# Patient Record
Sex: FEMALE | Race: WHITE | NOT HISPANIC OR LATINO | Employment: FULL TIME | URBAN - METROPOLITAN AREA
[De-identification: names, ages, dates, MRNs, and addresses within clinical notes are randomized per-mention and may not be internally consistent; named-entity substitution may affect disease eponyms.]

---

## 2023-09-11 PROBLEM — R41.3 MEMORY LOSS: Status: ACTIVE | Noted: 2023-09-11

## 2023-09-11 PROBLEM — N32.81 DETRUSOR INSTABILITY OF BLADDER: Status: ACTIVE | Noted: 2023-09-11

## 2023-09-11 PROBLEM — N95.1 FEMALE CLIMACTERIC STATE: Status: ACTIVE | Noted: 2023-09-11

## 2023-09-11 PROBLEM — G47.30 MILD SLEEP APNEA: Status: ACTIVE | Noted: 2023-09-11

## 2023-09-11 PROBLEM — E11.9 DIABETES MELLITUS WITHOUT COMPLICATION (MULTI): Status: ACTIVE | Noted: 2023-09-11

## 2023-09-11 PROBLEM — G56.01 CARPAL TUNNEL SYNDROME OF RIGHT WRIST: Status: ACTIVE | Noted: 2023-09-11

## 2023-09-11 PROBLEM — F33.1 MODERATE EPISODE OF RECURRENT MAJOR DEPRESSIVE DISORDER (MULTI): Status: ACTIVE | Noted: 2023-09-11

## 2023-09-11 PROBLEM — E78.5 HYPERLIPIDEMIA: Status: ACTIVE | Noted: 2023-09-11

## 2023-09-11 PROBLEM — K58.1 IRRITABLE BOWEL SYNDROME WITH CONSTIPATION: Status: ACTIVE | Noted: 2023-09-11

## 2023-09-11 PROBLEM — E03.9 HYPOTHYROIDISM: Status: ACTIVE | Noted: 2023-09-11

## 2023-09-11 PROBLEM — K90.9 INTESTINAL MALABSORPTION, UNSPECIFIED (HHS-HCC): Status: ACTIVE | Noted: 2023-09-11

## 2023-09-11 PROBLEM — M47.22 CERVICAL SPONDYLOSIS WITH RADICULOPATHY: Status: ACTIVE | Noted: 2023-09-11

## 2023-09-11 PROBLEM — F43.10 PTSD (POST-TRAUMATIC STRESS DISORDER): Status: ACTIVE | Noted: 2023-09-11

## 2023-09-11 PROBLEM — F41.9 ANXIETY: Status: ACTIVE | Noted: 2023-09-11

## 2023-09-11 RX ORDER — TRAZODONE HYDROCHLORIDE 100 MG/1
2 TABLET ORAL DAILY
COMMUNITY
Start: 2015-07-27 | End: 2023-12-20

## 2023-09-11 RX ORDER — GLIMEPIRIDE 2 MG/1
2 TABLET ORAL
COMMUNITY
Start: 2019-07-01 | End: 2024-03-04 | Stop reason: SDUPTHER

## 2023-09-11 RX ORDER — DULAGLUTIDE 4.5 MG/.5ML
4.5 INJECTION, SOLUTION SUBCUTANEOUS
COMMUNITY
Start: 2022-08-31 | End: 2023-10-13 | Stop reason: SDUPTHER

## 2023-09-11 RX ORDER — HYDROXYZINE HYDROCHLORIDE 25 MG/1
50 TABLET, FILM COATED ORAL EVERY 8 HOURS PRN
COMMUNITY
Start: 2020-07-08 | End: 2023-12-20 | Stop reason: SDUPTHER

## 2023-09-11 RX ORDER — PAROXETINE HYDROCHLORIDE 40 MG/1
TABLET, FILM COATED ORAL EVERY 24 HOURS
COMMUNITY
Start: 2013-09-06

## 2023-09-11 RX ORDER — PRAVASTATIN SODIUM 10 MG/1
10 TABLET ORAL DAILY
COMMUNITY
Start: 2021-03-25

## 2023-09-11 RX ORDER — METFORMIN HYDROCHLORIDE 500 MG/1
1000 TABLET, EXTENDED RELEASE ORAL 2 TIMES DAILY
COMMUNITY
Start: 2021-03-25 | End: 2023-12-04 | Stop reason: ALTCHOICE

## 2023-09-19 ENCOUNTER — HOSPITAL ENCOUNTER (OUTPATIENT)
Dept: DATA CONVERSION | Facility: HOSPITAL | Age: 63
Discharge: HOME | End: 2023-09-19
Payer: COMMERCIAL

## 2023-09-19 DIAGNOSIS — E11.9 TYPE 2 DIABETES MELLITUS WITHOUT COMPLICATIONS (MULTI): ICD-10-CM

## 2023-09-19 DIAGNOSIS — E78.2 MIXED HYPERLIPIDEMIA: ICD-10-CM

## 2023-09-19 LAB
ALBUMIN SERPL-MCNC: 4.2 GM/DL (ref 3.5–5)
ALBUMIN/GLOB SERPL: 1.6 RATIO (ref 1.5–3)
ALP BLD-CCNC: 100 U/L (ref 35–125)
ALT SERPL-CCNC: 10 U/L (ref 5–40)
ANION GAP SERPL CALCULATED.3IONS-SCNC: 14 MMOL/L (ref 0–19)
APPEARANCE PLAS: CLEAR
AST SERPL-CCNC: 15 U/L (ref 5–40)
BASOPHILS # BLD AUTO: 0.05 K/UL (ref 0–0.22)
BASOPHILS NFR BLD AUTO: 0.8 % (ref 0–1)
BILIRUB SERPL-MCNC: 0.4 MG/DL (ref 0.1–1.2)
BUN SERPL-MCNC: 12 MG/DL (ref 8–25)
BUN/CREAT SERPL: 15 RATIO (ref 8–21)
CALCIUM SERPL-MCNC: 9.1 MG/DL (ref 8.5–10.4)
CHLORIDE SERPL-SCNC: 101 MMOL/L (ref 97–107)
CHOLEST SERPL-MCNC: 281 MG/DL (ref 133–200)
CHOLEST/HDLC SERPL: 4.2 RATIO
CO2 SERPL-SCNC: 27 MMOL/L (ref 24–31)
COLOR SPUN FLD: YELLOW
CREAT SERPL-MCNC: 0.8 MG/DL (ref 0.4–1.6)
CREAT UR-MCNC: 96.8 MG/DL
DEPRECATED RDW RBC AUTO: 37 FL (ref 37–54)
DIFFERENTIAL METHOD BLD: ABNORMAL
EOSINOPHIL # BLD AUTO: 0.09 K/UL (ref 0–0.45)
EOSINOPHIL NFR BLD: 1.4 % (ref 0–3)
ERYTHROCYTE [DISTWIDTH] IN BLOOD BY AUTOMATED COUNT: 12.3 % (ref 11.7–15)
FASTING STATUS PATIENT QL REPORTED: ABNORMAL
GFR SERPL CREATININE-BSD FRML MDRD: 83 ML/MIN/1.73 M2
GLOBULIN SER-MCNC: 2.6 G/DL (ref 1.9–3.7)
GLUCOSE SERPL-MCNC: 182 MG/DL (ref 65–99)
HCT VFR BLD AUTO: 40.5 % (ref 36–44)
HDLC SERPL-MCNC: 67 MG/DL
HGB BLD-MCNC: 13.5 GM/DL (ref 12–15)
IMM GRANULOCYTES # BLD AUTO: 0.01 K/UL (ref 0–0.1)
LDLC SERPL CALC-MCNC: 182 MG/DL (ref 65–130)
LYMPHOCYTES # BLD AUTO: 2.28 K/UL (ref 1.2–3.2)
LYMPHOCYTES NFR BLD MANUAL: 36 % (ref 20–40)
MCH RBC QN AUTO: 27.3 PG (ref 26–34)
MCHC RBC AUTO-ENTMCNC: 33.3 % (ref 31–37)
MCV RBC AUTO: 82 FL (ref 80–100)
MICROALBUMIN UR-MCNC: 12 MG/L (ref 0–23)
MICROALBUMIN/CREAT UR: 12.4 MG/G (ref 0–30)
MONOCYTES # BLD AUTO: 0.48 K/UL (ref 0–0.8)
MONOCYTES NFR BLD MANUAL: 7.6 % (ref 0–8)
NEUTROPHILS # BLD AUTO: 3.43 K/UL
NEUTROPHILS # BLD AUTO: 3.43 K/UL (ref 1.8–7.7)
NEUTROPHILS.IMMATURE NFR BLD: 0.2 % (ref 0–1)
NEUTS SEG NFR BLD: 54 % (ref 50–70)
NRBC BLD-RTO: 0 /100 WBC
PLATELET # BLD AUTO: 367 K/UL (ref 150–450)
PMV BLD AUTO: 9.8 CU (ref 7–12.6)
POTASSIUM SERPL-SCNC: 4.3 MMOL/L (ref 3.4–5.1)
PROT SERPL-MCNC: 6.8 G/DL (ref 5.9–7.9)
RBC # BLD AUTO: 4.94 M/UL (ref 4–4.9)
SODIUM SERPL-SCNC: 142 MMOL/L (ref 133–145)
TRIGL SERPL-MCNC: 161 MG/DL (ref 40–150)
TSH SERPL DL<=0.05 MIU/L-ACNC: 0.86 MIU/L (ref 0.27–4.2)
WBC # BLD AUTO: 6.3 K/UL (ref 4.5–11)

## 2023-10-05 DIAGNOSIS — E11.9 DIABETES MELLITUS WITHOUT COMPLICATION (MULTI): ICD-10-CM

## 2023-10-16 RX ORDER — DULAGLUTIDE 4.5 MG/.5ML
4.5 INJECTION, SOLUTION SUBCUTANEOUS
Qty: 2 ML | Refills: 11 | Status: SHIPPED | OUTPATIENT
Start: 2023-10-16

## 2023-11-07 RX ORDER — DULAGLUTIDE 4.5 MG/.5ML
INJECTION, SOLUTION SUBCUTANEOUS
Qty: 6 ML | Refills: 3 | OUTPATIENT
Start: 2023-11-07

## 2023-12-04 ENCOUNTER — OFFICE VISIT (OUTPATIENT)
Dept: PRIMARY CARE | Facility: CLINIC | Age: 63
End: 2023-12-04
Payer: COMMERCIAL

## 2023-12-04 VITALS
RESPIRATION RATE: 16 BRPM | DIASTOLIC BLOOD PRESSURE: 78 MMHG | HEIGHT: 61 IN | HEART RATE: 66 BPM | SYSTOLIC BLOOD PRESSURE: 120 MMHG | BODY MASS INDEX: 24.92 KG/M2 | OXYGEN SATURATION: 97 % | WEIGHT: 132 LBS

## 2023-12-04 DIAGNOSIS — E11.9 DIABETES MELLITUS WITHOUT COMPLICATION (MULTI): ICD-10-CM

## 2023-12-04 DIAGNOSIS — Z12.31 SCREENING MAMMOGRAM FOR BREAST CANCER: Primary | ICD-10-CM

## 2023-12-04 LAB — POC HEMOGLOBIN A1C: 8.5 % (ref 4.2–6.5)

## 2023-12-04 PROCEDURE — 99214 OFFICE O/P EST MOD 30 MIN: CPT | Performed by: INTERNAL MEDICINE

## 2023-12-04 PROCEDURE — 83036 HEMOGLOBIN GLYCOSYLATED A1C: CPT | Performed by: INTERNAL MEDICINE

## 2023-12-04 PROCEDURE — 1036F TOBACCO NON-USER: CPT | Performed by: INTERNAL MEDICINE

## 2023-12-04 PROCEDURE — 3078F DIAST BP <80 MM HG: CPT | Performed by: INTERNAL MEDICINE

## 2023-12-04 PROCEDURE — 3074F SYST BP LT 130 MM HG: CPT | Performed by: INTERNAL MEDICINE

## 2023-12-04 RX ORDER — BLOOD-GLUCOSE SENSOR
EACH MISCELLANEOUS
Qty: 2 EACH | Refills: 11 | Status: SHIPPED | OUTPATIENT
Start: 2023-12-04

## 2023-12-04 RX ORDER — EMPAGLIFLOZIN 10 MG/1
10 TABLET, FILM COATED ORAL EVERY 24 HOURS
COMMUNITY
Start: 2023-09-14 | End: 2023-12-04 | Stop reason: DRUGHIGH

## 2023-12-04 RX ORDER — VIT C/E/ZN/COPPR/LUTEIN/ZEAXAN 250MG-90MG
1 CAPSULE ORAL EVERY 24 HOURS
COMMUNITY

## 2023-12-04 RX ORDER — BISMUTH SUBSALICYLATE 262 MG
1 TABLET,CHEWABLE ORAL DAILY
COMMUNITY

## 2023-12-04 ASSESSMENT — ENCOUNTER SYMPTOMS
WEAKNESS: 0
SINUS PAIN: 0
HEMATURIA: 0
APPETITE CHANGE: 0
FREQUENCY: 0
NERVOUS/ANXIOUS: 1
FATIGUE: 0
JOINT SWELLING: 0
SHORTNESS OF BREATH: 0
CONSTIPATION: 0
SLEEP DISTURBANCE: 0
HEADACHES: 0
DIZZINESS: 0
ARTHRALGIAS: 0
COUGH: 0
NAUSEA: 0
ABDOMINAL PAIN: 0
PALPITATIONS: 0
FEVER: 0
RHINORRHEA: 0
CHILLS: 0
SORE THROAT: 0
DIFFICULTY URINATING: 0
VOMITING: 0
DIARRHEA: 0

## 2023-12-04 ASSESSMENT — PAIN SCALES - GENERAL: PAINLEVEL: 4

## 2023-12-04 NOTE — PROGRESS NOTES
"Subjective   Patient ID: Desiree Shepard is a 63 y.o. female who presents for 3 month check up. She endorses increased stress and anxiety. Reports having panic attacks. Monitors her blood sugars daily. She walks all day at work and follows a low carb diet. Aware she is due for mammogram. Received both doses of Shingrix.    Diagnostics Reviewed:  Labs Reviewed: 12/2023 A1c 8.5. 9/2023 cholesterol 281, CBC nml, TSH nml, CMP nml.         Review of Systems   Constitutional:  Negative for appetite change, chills, fatigue and fever.   HENT:  Negative for ear pain, rhinorrhea, sinus pain and sore throat.    Eyes:  Negative for visual disturbance.   Respiratory:  Negative for cough and shortness of breath.    Cardiovascular:  Negative for chest pain and palpitations.   Gastrointestinal:  Negative for abdominal pain, constipation, diarrhea, nausea and vomiting.   Genitourinary:  Negative for difficulty urinating, frequency and hematuria.   Musculoskeletal:  Negative for arthralgias and joint swelling.   Skin:  Negative for rash.   Neurological:  Negative for dizziness, weakness and headaches.   Psychiatric/Behavioral:  Negative for sleep disturbance. The patient is nervous/anxious.         Increased stress, panic attacks       Objective   /78   Pulse 66   Resp 16   Ht 1.549 m (5' 1\")   Wt 59.9 kg (132 lb)   SpO2 97%   BMI 24.94 kg/m²     Physical Exam  Eyes:      Conjunctiva/sclera: Conjunctivae normal.      Pupils: Pupils are equal, round, and reactive to light.   Neck:      Thyroid: No thyromegaly.      Vascular: No carotid bruit.   Cardiovascular:      Rate and Rhythm: Regular rhythm.      Heart sounds: Normal heart sounds.   Pulmonary:      Breath sounds: Normal breath sounds.   Abdominal:      General: Bowel sounds are normal.      Palpations: Abdomen is soft. There is no hepatomegaly.      Tenderness: There is no abdominal tenderness.   Musculoskeletal:      Right lower leg: No edema.      Left lower leg: No " edema.   Lymphadenopathy:      Cervical: No cervical adenopathy.   Skin:     General: Skin is warm.      Comments: No suspicious moles on back   Neurological:      Mental Status: She is alert and oriented to person, place, and time.   Psychiatric:         Mood and Affect: Mood and affect normal.         Behavior: Behavior normal. Behavior is cooperative.         Cognition and Memory: Cognition normal.         Assessment/Plan   Diagnoses and all orders for this visit:  Screening mammogram for breast cancer  -     BI mammo bilateral screening tomosynthesis  Diabetes mellitus without complication (CMS/HCC)  -     POCT glycosylated hemoglobin (Hb A1C) manually resulted  -     empagliflozin (Jardiance) 25 mg; Take 1 tablet (25 mg) by mouth once daily.  -     blood-glucose sensor (Dexcom G7 Sensor) device; Change every 2 weeks      Scribe Attestation  By signing my name below, IPrincess, Scribe   attest that this documentation has been prepared under the direction and in the presence of Sheila Cheney MD.

## 2024-03-04 ENCOUNTER — OFFICE VISIT (OUTPATIENT)
Dept: PRIMARY CARE | Facility: CLINIC | Age: 64
End: 2024-03-04
Payer: COMMERCIAL

## 2024-03-04 VITALS
DIASTOLIC BLOOD PRESSURE: 70 MMHG | OXYGEN SATURATION: 97 % | HEART RATE: 80 BPM | BODY MASS INDEX: 23.41 KG/M2 | HEIGHT: 61 IN | WEIGHT: 124 LBS | RESPIRATION RATE: 16 BRPM | SYSTOLIC BLOOD PRESSURE: 122 MMHG

## 2024-03-04 DIAGNOSIS — M47.816 LUMBAR SPONDYLOSIS: ICD-10-CM

## 2024-03-04 DIAGNOSIS — F33.1 MODERATE EPISODE OF RECURRENT MAJOR DEPRESSIVE DISORDER (MULTI): ICD-10-CM

## 2024-03-04 DIAGNOSIS — E11.9 DIABETES MELLITUS WITHOUT COMPLICATION (MULTI): Primary | ICD-10-CM

## 2024-03-04 LAB — POC HEMOGLOBIN A1C: 8.6 % (ref 4.2–6.5)

## 2024-03-04 PROCEDURE — 1036F TOBACCO NON-USER: CPT | Performed by: INTERNAL MEDICINE

## 2024-03-04 PROCEDURE — 99214 OFFICE O/P EST MOD 30 MIN: CPT | Performed by: INTERNAL MEDICINE

## 2024-03-04 PROCEDURE — 83036 HEMOGLOBIN GLYCOSYLATED A1C: CPT | Mod: QW | Performed by: INTERNAL MEDICINE

## 2024-03-04 PROCEDURE — 3078F DIAST BP <80 MM HG: CPT | Performed by: INTERNAL MEDICINE

## 2024-03-04 PROCEDURE — 3074F SYST BP LT 130 MM HG: CPT | Performed by: INTERNAL MEDICINE

## 2024-03-04 RX ORDER — GLIMEPIRIDE 2 MG/1
2 TABLET ORAL 2 TIMES DAILY
Qty: 180 TABLET | Refills: 3 | Status: SHIPPED | OUTPATIENT
Start: 2024-03-04 | End: 2025-03-04

## 2024-03-04 ASSESSMENT — ENCOUNTER SYMPTOMS
DEPRESSION: 0
CONSTIPATION: 0
ARTHRALGIAS: 0
PALPITATIONS: 0
OCCASIONAL FEELINGS OF UNSTEADINESS: 0
LOSS OF SENSATION IN FEET: 0
RHINORRHEA: 1
SHORTNESS OF BREATH: 0
NAUSEA: 0
DIZZINESS: 0
ABDOMINAL PAIN: 0
COUGH: 0
DIARRHEA: 0

## 2024-03-04 ASSESSMENT — PATIENT HEALTH QUESTIONNAIRE - PHQ9
1. LITTLE INTEREST OR PLEASURE IN DOING THINGS: NOT AT ALL
2. FEELING DOWN, DEPRESSED OR HOPELESS: NOT AT ALL
SUM OF ALL RESPONSES TO PHQ9 QUESTIONS 1 AND 2: 0

## 2024-03-04 ASSESSMENT — PAIN SCALES - GENERAL: PAINLEVEL: 2

## 2024-03-04 NOTE — PROGRESS NOTES
"Subjective   Patient ID: Desiree Shepard is a 63 y.o. female who presents for follow up regarding diabetes mellitus. Overall she is feeling well. Her blood sugars are better controlled. She requests a letter for work stating that she cannot lift more than 35 lbs due to back pain. Patient also has hx of recurrent hernias. Hx of appendectomy and cholecystectomy. Only received first dose of shingrix. She c/o constant rhinorrhea, she works in fridge. Sees ophthalmologist regularly.    Diagnostics Reviewed: Due for mammogram. 2022 cologuard nml, due 2025   Labs Reviewed: 3/2024 A1c 8.6. 12/2023 A1c 8.5. 9/2023 CMP nml. 2017 PAP nml, aware she is due.         Review of Systems   HENT:  Positive for rhinorrhea.    Respiratory:  Negative for cough and shortness of breath.    Cardiovascular:  Negative for chest pain and palpitations.   Gastrointestinal:  Negative for abdominal pain, constipation, diarrhea and nausea.   Musculoskeletal:  Negative for arthralgias.   Neurological:  Negative for dizziness.       Objective   /70   Pulse 80   Resp 16   Ht 1.549 m (5' 1\")   Wt 56.2 kg (124 lb)   SpO2 97%   BMI 23.43 kg/m²     Physical Exam  HENT:      Right Ear: Tympanic membrane normal.      Left Ear: Tympanic membrane normal.      Mouth/Throat:      Pharynx: Oropharynx is clear. No oropharyngeal exudate.   Eyes:      Conjunctiva/sclera: Conjunctivae normal.      Pupils: Pupils are equal, round, and reactive to light.   Cardiovascular:      Rate and Rhythm: Normal rate and regular rhythm.      Heart sounds: Normal heart sounds.   Pulmonary:      Breath sounds: Normal breath sounds.   Chest:   Breasts:     Right: Normal. No mass or tenderness.      Left: Normal. No mass or tenderness.   Abdominal:      Palpations: Abdomen is soft.      Tenderness: There is no abdominal tenderness.   Musculoskeletal:      Right lower leg: No edema.      Left lower leg: No edema.   Lymphadenopathy:      Cervical: No cervical adenopathy. "   Skin:     General: Skin is warm.      Comments: No suspicious moles on back   Neurological:      Mental Status: She is alert and oriented to person, place, and time.   Psychiatric:         Mood and Affect: Mood normal.         Behavior: Behavior normal.         Assessment/Plan   Diagnoses and all orders for this visit:  Diabetes mellitus without complication (CMS/HCC)  -     CBC and Auto Differential; Future  -     Comprehensive Metabolic Panel; Future  -     POCT glycosylated hemoglobin (Hb A1C) manually resulted  -     glimepiride (Amaryl) 2 mg tablet; Take 1 tablet (2 mg) by mouth 2 times a day.  Moderate episode of recurrent major depressive disorder (CMS/HCC)  Lumbar spondylosis      Scribe Attestation  By signing my name below, I, Princess Giron, Scribmayco   attest that this documentation has been prepared under the direction and in the presence of Sheila Cheney MD.

## 2024-03-04 NOTE — PATIENT INSTRUCTIONS
Note for work stating she cannot lift more than 35 lbs continuously. Recommend second dose of Shingrix

## 2024-03-05 ENCOUNTER — LAB (OUTPATIENT)
Dept: LAB | Facility: LAB | Age: 64
End: 2024-03-05
Payer: COMMERCIAL

## 2024-03-05 DIAGNOSIS — E11.9 DIABETES MELLITUS WITHOUT COMPLICATION (MULTI): ICD-10-CM

## 2024-03-05 LAB
ALBUMIN SERPL-MCNC: 4.9 G/DL (ref 3.5–5)
ALP BLD-CCNC: 108 U/L (ref 35–125)
ALT SERPL-CCNC: 10 U/L (ref 5–40)
ANION GAP SERPL CALC-SCNC: 16 MMOL/L
AST SERPL-CCNC: 17 U/L (ref 5–40)
BASOPHILS # BLD AUTO: 0.07 X10*3/UL (ref 0–0.1)
BASOPHILS NFR BLD AUTO: 0.7 %
BILIRUB SERPL-MCNC: 0.2 MG/DL (ref 0.1–1.2)
BUN SERPL-MCNC: 16 MG/DL (ref 8–25)
CALCIUM SERPL-MCNC: 10.2 MG/DL (ref 8.5–10.4)
CHLORIDE SERPL-SCNC: 102 MMOL/L (ref 97–107)
CO2 SERPL-SCNC: 24 MMOL/L (ref 24–31)
CREAT SERPL-MCNC: 0.8 MG/DL (ref 0.4–1.6)
EGFRCR SERPLBLD CKD-EPI 2021: 83 ML/MIN/1.73M*2
EOSINOPHIL # BLD AUTO: 0.08 X10*3/UL (ref 0–0.7)
EOSINOPHIL NFR BLD AUTO: 0.8 %
ERYTHROCYTE [DISTWIDTH] IN BLOOD BY AUTOMATED COUNT: 13.1 % (ref 11.5–14.5)
GLUCOSE SERPL-MCNC: 123 MG/DL (ref 65–99)
HCT VFR BLD AUTO: 42.3 % (ref 36–46)
HGB BLD-MCNC: 13.6 G/DL (ref 12–16)
IMM GRANULOCYTES # BLD AUTO: 0.02 X10*3/UL (ref 0–0.7)
IMM GRANULOCYTES NFR BLD AUTO: 0.2 % (ref 0–0.9)
LYMPHOCYTES # BLD AUTO: 3.99 X10*3/UL (ref 1.2–4.8)
LYMPHOCYTES NFR BLD AUTO: 38.9 %
MCH RBC QN AUTO: 27.3 PG (ref 26–34)
MCHC RBC AUTO-ENTMCNC: 32.2 G/DL (ref 32–36)
MCV RBC AUTO: 85 FL (ref 80–100)
MONOCYTES # BLD AUTO: 0.69 X10*3/UL (ref 0.1–1)
MONOCYTES NFR BLD AUTO: 6.7 %
NEUTROPHILS # BLD AUTO: 5.42 X10*3/UL (ref 1.2–7.7)
NEUTROPHILS NFR BLD AUTO: 52.7 %
NRBC BLD-RTO: 0 /100 WBCS (ref 0–0)
PLATELET # BLD AUTO: 384 X10*3/UL (ref 150–450)
POTASSIUM SERPL-SCNC: 4.1 MMOL/L (ref 3.4–5.1)
PROT SERPL-MCNC: 7.6 G/DL (ref 5.9–7.9)
RBC # BLD AUTO: 4.99 X10*6/UL (ref 4–5.2)
SODIUM SERPL-SCNC: 142 MMOL/L (ref 133–145)
WBC # BLD AUTO: 10.3 X10*3/UL (ref 4.4–11.3)

## 2024-03-05 PROCEDURE — 36415 COLL VENOUS BLD VENIPUNCTURE: CPT

## 2024-03-05 PROCEDURE — 85025 COMPLETE CBC W/AUTO DIFF WBC: CPT

## 2024-03-05 PROCEDURE — 80053 COMPREHEN METABOLIC PANEL: CPT

## 2024-03-06 ENCOUNTER — HOSPITAL ENCOUNTER (OUTPATIENT)
Dept: RADIOLOGY | Facility: HOSPITAL | Age: 64
Discharge: HOME | End: 2024-03-06
Payer: COMMERCIAL

## 2024-03-06 VITALS — BODY MASS INDEX: 23.41 KG/M2 | HEIGHT: 61 IN | WEIGHT: 124 LBS

## 2024-03-06 PROCEDURE — 77067 SCR MAMMO BI INCL CAD: CPT

## 2024-03-06 PROCEDURE — 77063 BREAST TOMOSYNTHESIS BI: CPT | Performed by: RADIOLOGY

## 2024-03-06 PROCEDURE — 77067 SCR MAMMO BI INCL CAD: CPT | Performed by: RADIOLOGY

## 2024-05-01 ENCOUNTER — OFFICE VISIT (OUTPATIENT)
Dept: OBSTETRICS AND GYNECOLOGY | Facility: CLINIC | Age: 64
End: 2024-05-01
Payer: COMMERCIAL

## 2024-05-01 VITALS
HEIGHT: 61 IN | DIASTOLIC BLOOD PRESSURE: 53 MMHG | SYSTOLIC BLOOD PRESSURE: 110 MMHG | BODY MASS INDEX: 24.43 KG/M2 | WEIGHT: 129.4 LBS

## 2024-05-01 DIAGNOSIS — Z12.31 ENCOUNTER FOR SCREENING MAMMOGRAM FOR MALIGNANT NEOPLASM OF BREAST: ICD-10-CM

## 2024-05-01 DIAGNOSIS — Z11.51 SCREENING FOR HUMAN PAPILLOMAVIRUS (HPV): ICD-10-CM

## 2024-05-01 DIAGNOSIS — Z01.419 ENCOUNTER FOR ANNUAL ROUTINE GYNECOLOGICAL EXAMINATION: Primary | ICD-10-CM

## 2024-05-01 PROCEDURE — 1036F TOBACCO NON-USER: CPT

## 2024-05-01 PROCEDURE — 87624 HPV HI-RISK TYP POOLED RSLT: CPT

## 2024-05-01 PROCEDURE — 3078F DIAST BP <80 MM HG: CPT

## 2024-05-01 PROCEDURE — 99386 PREV VISIT NEW AGE 40-64: CPT

## 2024-05-01 PROCEDURE — 88175 CYTOPATH C/V AUTO FLUID REDO: CPT | Mod: TC,GCY,WESLAB

## 2024-05-01 PROCEDURE — 3074F SYST BP LT 130 MM HG: CPT

## 2024-05-01 ASSESSMENT — LIFESTYLE VARIABLES
HOW OFTEN DO YOU HAVE SIX OR MORE DRINKS ON ONE OCCASION: NEVER
AUDIT-C TOTAL SCORE: 1
HOW OFTEN DO YOU HAVE A DRINK CONTAINING ALCOHOL: MONTHLY OR LESS
SKIP TO QUESTIONS 9-10: 1
HOW MANY STANDARD DRINKS CONTAINING ALCOHOL DO YOU HAVE ON A TYPICAL DAY: 1 OR 2

## 2024-05-01 ASSESSMENT — ENCOUNTER SYMPTOMS
OCCASIONAL FEELINGS OF UNSTEADINESS: 0
DEPRESSION: 0
LOSS OF SENSATION IN FEET: 0

## 2024-05-01 ASSESSMENT — PATIENT HEALTH QUESTIONNAIRE - PHQ9
2. FEELING DOWN, DEPRESSED OR HOPELESS: NOT AT ALL
SUM OF ALL RESPONSES TO PHQ9 QUESTIONS 1 & 2: 0
1. LITTLE INTEREST OR PLEASURE IN DOING THINGS: NOT AT ALL

## 2024-05-01 ASSESSMENT — PAIN SCALES - GENERAL: PAINLEVEL: 0-NO PAIN

## 2024-05-01 NOTE — PROGRESS NOTES
"Subjective   Desiree Shepard is a 63 y.o. female who is here for a routine GYN exam. Last saw Dr. Mo in 2018. Denies vaginal bleeding. Denies pelvic pain, pressure, or bloating. Denies breast changes or concerns. Denies hematochezia, rectal bleeding, or bowel changes. Does c/o dyspareunia; mainly during initial penetration at the vaginal opening. Denies PCB. Denies deeper pelvis pain.       Complaints:   dyspareunia   Periods: menopausal  Pain:    see hpi     HRT: no  History of abnormal Pap smear: no  History of abnormal mammogram: no      OB History          3    Para   3    Term   3            AB        Living   3         SAB        IAB        Ectopic        Multiple        Live Births   3                  Review of Systems   Constitutional:  Negative for chills, fatigue, fever and unexpected weight change.   Respiratory:  Negative for cough and shortness of breath.    Gastrointestinal:  Negative for abdominal pain, nausea and vomiting.   Genitourinary:  Positive for dyspareunia. Negative for dysuria, pelvic pain and vaginal discharge.   Skin:  Negative for color change and rash.   Neurological:  Negative for dizziness and headaches.       Objective   /53   Ht 1.549 m (5' 1\")   Wt 58.7 kg (129 lb 6.4 oz)   BMI 24.45 kg/m²        General:   Alert and oriented, in no acute distress   Neck: Supple. No visible thyromegaly.    Breast/Axilla: Normal to palpation bilaterally without masses, skin changes, or nipple discharge.    Abdomen: Soft, non-tender, without masses or organomegaly   Vulva: Normal architecture without erythema, masses, or lesions.    Vagina: Normal mucosa without lesions, masses.  Positive atrophy. No abnormal vaginal discharge.    Cervix: Normal without masses, lesions, or signs of cervicitis. Pap performed    Uterus: Normal mobile, non-enlarged uterus    Adnexa: Normal without masses or lesions   Pelvic Floor No POP noted. No high tone pelvic floor    Psych Normal affect. " Normal mood.      Assessment/Plan   -Due for pap smear, obtained.  -UTD on mammogram, 3/6/24 negative.   -Rvwd GSM and associated vaginal anatomy/tissue changes; rvwd mgt options including otc moisturizers and vaginal ERT. Strong fam hx vasc dz / blood clots. She will trial otc Replens initially; I encouraged use twice weekly at bedtime, along with lubricant use during intercourse (replens, hyalogyn, uberlube). [place] handout provided on GSM with associated recommended products. TCO if not improving.     Connie Roberts PA-C

## 2024-05-02 ASSESSMENT — ENCOUNTER SYMPTOMS
UNEXPECTED WEIGHT CHANGE: 0
NAUSEA: 0
CHILLS: 0
COLOR CHANGE: 0
COUGH: 0
ABDOMINAL PAIN: 0
VOMITING: 0
FATIGUE: 0
DYSURIA: 0
FEVER: 0
HEADACHES: 0
DIZZINESS: 0
SHORTNESS OF BREATH: 0

## 2024-05-09 LAB
CYTOLOGY CMNT CVX/VAG CYTO-IMP: NORMAL
HPV HR 12 DNA GENITAL QL NAA+PROBE: POSITIVE
HPV HR GENOTYPES PNL CVX NAA+PROBE: POSITIVE
HPV16 DNA SPEC QL NAA+PROBE: NEGATIVE
HPV18 DNA SPEC QL NAA+PROBE: NEGATIVE
LAB AP HPV GENOTYPE QUESTION: YES
LAB AP HPV HR: NORMAL
LABORATORY COMMENT REPORT: NORMAL
PATH REPORT.TOTAL CANCER: NORMAL

## 2024-06-06 ENCOUNTER — OFFICE VISIT (OUTPATIENT)
Dept: PRIMARY CARE | Facility: CLINIC | Age: 64
End: 2024-06-06
Payer: COMMERCIAL

## 2024-06-06 VITALS
WEIGHT: 127 LBS | DIASTOLIC BLOOD PRESSURE: 80 MMHG | RESPIRATION RATE: 16 BRPM | HEART RATE: 66 BPM | OXYGEN SATURATION: 99 % | BODY MASS INDEX: 23.98 KG/M2 | SYSTOLIC BLOOD PRESSURE: 128 MMHG | HEIGHT: 61 IN

## 2024-06-06 DIAGNOSIS — E11.9 DIABETES MELLITUS WITHOUT COMPLICATION (MULTI): ICD-10-CM

## 2024-06-06 DIAGNOSIS — M47.816 LUMBAR SPONDYLOSIS: ICD-10-CM

## 2024-06-06 DIAGNOSIS — L02.92 FURUNCLE: Primary | ICD-10-CM

## 2024-06-06 LAB — POC HEMOGLOBIN A1C: 7.7 % (ref 4.2–6.5)

## 2024-06-06 PROCEDURE — 99214 OFFICE O/P EST MOD 30 MIN: CPT | Performed by: INTERNAL MEDICINE

## 2024-06-06 PROCEDURE — 3074F SYST BP LT 130 MM HG: CPT | Performed by: INTERNAL MEDICINE

## 2024-06-06 PROCEDURE — 83036 HEMOGLOBIN GLYCOSYLATED A1C: CPT | Performed by: INTERNAL MEDICINE

## 2024-06-06 PROCEDURE — 3079F DIAST BP 80-89 MM HG: CPT | Performed by: INTERNAL MEDICINE

## 2024-06-06 RX ORDER — AMOXICILLIN AND CLAVULANATE POTASSIUM 875; 125 MG/1; MG/1
875 TABLET, FILM COATED ORAL 2 TIMES DAILY
Qty: 20 TABLET | Refills: 0 | Status: SHIPPED | OUTPATIENT
Start: 2024-06-06 | End: 2024-06-16

## 2024-06-06 RX ORDER — DULAGLUTIDE 4.5 MG/.5ML
4.5 INJECTION, SOLUTION SUBCUTANEOUS
Qty: 2 ML | Refills: 11 | Status: SHIPPED | OUTPATIENT
Start: 2024-06-09

## 2024-06-06 RX ORDER — DOXYCYCLINE 100 MG/1
100 CAPSULE ORAL 2 TIMES DAILY
Qty: 20 CAPSULE | Refills: 0 | Status: SHIPPED | OUTPATIENT
Start: 2024-06-06 | End: 2024-06-16

## 2024-06-06 ASSESSMENT — ENCOUNTER SYMPTOMS
DIARRHEA: 0
DEPRESSION: 0
SHORTNESS OF BREATH: 0
LOSS OF SENSATION IN FEET: 0
OCCASIONAL FEELINGS OF UNSTEADINESS: 0
ROS SKIN COMMENTS: BOIL
PALPITATIONS: 0
NAUSEA: 0
DIZZINESS: 0
ARTHRALGIAS: 0
CONSTIPATION: 0
ABDOMINAL PAIN: 0
COUGH: 0

## 2024-06-06 ASSESSMENT — PATIENT HEALTH QUESTIONNAIRE - PHQ9
SUM OF ALL RESPONSES TO PHQ9 QUESTIONS 1 AND 2: 0
2. FEELING DOWN, DEPRESSED OR HOPELESS: NOT AT ALL
1. LITTLE INTEREST OR PLEASURE IN DOING THINGS: NOT AT ALL

## 2024-06-06 ASSESSMENT — PAIN SCALES - GENERAL: PAINLEVEL: 0-NO PAIN

## 2024-06-06 NOTE — PROGRESS NOTES
"Subjective   Patient ID: Desiree Shepard is a 63 y.o. female who presents for 3 month check up. Overall she is feeling well. Patient reports a boil in her peritoneal area. She has been watching her diet and limiting carbs. Monitors her blood sugars daily and reports stable readings. She stays active at work. Received both doses of shingrix. Sees ophthalmologist and regularly.    Diagnostics Reviewed: 3/2024 mammogram nml.  Labs Reviewed: 6/2024 A1c 7.7. 3/2024 CBC nml, CMP nml.         Review of Systems   Respiratory:  Negative for cough and shortness of breath.    Cardiovascular:  Negative for chest pain and palpitations.   Gastrointestinal:  Negative for abdominal pain, constipation, diarrhea and nausea.   Musculoskeletal:  Negative for arthralgias.   Skin:         boil   Neurological:  Negative for dizziness.       Objective   /80   Pulse 66   Resp 16   Ht 1.549 m (5' 1\")   Wt 57.6 kg (127 lb)   SpO2 99%   BMI 24.00 kg/m²     Physical Exam  HENT:      Right Ear: Tympanic membrane normal.      Left Ear: Tympanic membrane normal.      Mouth/Throat:      Pharynx: Oropharynx is clear. No oropharyngeal exudate.   Eyes:      Conjunctiva/sclera: Conjunctivae normal.      Pupils: Pupils are equal, round, and reactive to light.   Neck:      Vascular: No carotid bruit.   Cardiovascular:      Rate and Rhythm: Normal rate and regular rhythm.      Pulses:           Dorsalis pedis pulses are 2+ on the right side and 2+ on the left side.      Heart sounds: Normal heart sounds.   Pulmonary:      Breath sounds: Normal breath sounds.   Abdominal:      Palpations: Abdomen is soft. There is no hepatomegaly.      Tenderness: There is no abdominal tenderness.   Musculoskeletal:      Right lower leg: No edema.      Left lower leg: No edema.   Lymphadenopathy:      Cervical: No cervical adenopathy.   Neurological:      Mental Status: She is alert and oriented to person, place, and time.      Gait: Gait normal.   Psychiatric:    "      Mood and Affect: Mood normal.         Behavior: Behavior normal.         Assessment/Plan   Diagnoses and all orders for this visit:  Furuncle  -     doxycycline (Vibramycin) 100 mg capsule; Take 1 capsule (100 mg) by mouth 2 times a day for 10 days. Take with at least 8 ounces (large glass) of water, do not lie down for 30 minutes after  -     amoxicillin-pot clavulanate (Augmentin) 875-125 mg tablet; Take 1 tablet (875 mg) by mouth 2 times a day for 10 days.  Diabetes mellitus without complication (Multi)  -     POCT glycosylated hemoglobin (Hb A1C) manually resulted  -     dulaglutide (Trulicity) 4.5 mg/0.5 mL pen injector; Inject 4.5 mg under the skin 1 (one) time per week.  -     zoster vaccine-recombinant adjuvanted (Shingrix) 50 mcg/0.5 mL vaccine; Inject 0.5 mL into the muscle every 8 (eight) weeks.  Lumbar spondylosis      Scribe Attestation  By signing my name below, IPrincess, Scribe   attest that this documentation has been prepared under the direction and in the presence of Sheila Cheney MD.

## 2024-06-13 ENCOUNTER — CLINICAL SUPPORT (OUTPATIENT)
Dept: OPHTHALMOLOGY | Facility: CLINIC | Age: 64
End: 2024-06-13
Payer: COMMERCIAL

## 2024-06-13 ENCOUNTER — OFFICE VISIT (OUTPATIENT)
Dept: OPHTHALMOLOGY | Facility: CLINIC | Age: 64
End: 2024-06-13
Payer: COMMERCIAL

## 2024-06-13 ENCOUNTER — APPOINTMENT (OUTPATIENT)
Dept: OPHTHALMOLOGY | Facility: CLINIC | Age: 64
End: 2024-06-13
Payer: COMMERCIAL

## 2024-06-13 DIAGNOSIS — H52.7 UNSPECIFIED DISORDER OF REFRACTION: ICD-10-CM

## 2024-06-13 DIAGNOSIS — E11.9 DIABETES MELLITUS WITHOUT COMPLICATION (MULTI): Primary | ICD-10-CM

## 2024-06-13 DIAGNOSIS — H25.13 AGE-RELATED NUCLEAR CATARACT OF BOTH EYES: ICD-10-CM

## 2024-06-13 PROCEDURE — 92015 DETERMINE REFRACTIVE STATE: CPT | Performed by: OPHTHALMOLOGY

## 2024-06-13 PROCEDURE — 99214 OFFICE O/P EST MOD 30 MIN: CPT | Performed by: OPHTHALMOLOGY

## 2024-06-13 ASSESSMENT — TONOMETRY
OS_IOP_MMHG: 14
OD_IOP_MMHG: 14
IOP_METHOD: GOLDMANN APPLANATION

## 2024-06-13 ASSESSMENT — VISUAL ACUITY
OD_CC: 20/30
OD_CC+: -2
METHOD: SNELLEN - SINGLE
OS_CC: 20/25
OS_CC+: -2
CORRECTION_TYPE: GLASSES

## 2024-06-13 ASSESSMENT — KERATOMETRY
OS_K2POWER_DIOPTERS: 43.75
OS_AXISANGLE2_DEGREES: 75
OS_K1POWER_DIOPTERS: 43.50
OD_AXISANGLE2_DEGREES: 100
OD_AXISANGLE_DEGREES: 10
OD_K1POWER_DIOPTERS: 43.50
OS_AXISANGLE_DEGREES: 165
OD_K2POWER_DIOPTERS: 44.25
METHOD_AUTO_MANUAL: AUTOMATED

## 2024-06-13 ASSESSMENT — REFRACTION_MANIFEST
METHOD_AUTOREFRACTION: 1
OS_CYLINDER: -0.50
OD_AXIS: 100
OD_CYLINDER: -1.50
OS_AXIS: 075
OD_SPHERE: +1.00
OS_SPHERE: +0.75

## 2024-06-13 ASSESSMENT — SLIT LAMP EXAM - LIDS
COMMENTS: NORMAL
COMMENTS: NORMAL

## 2024-06-13 ASSESSMENT — REFRACTION_WEARINGRX
OD_AXIS: 090
OS_ADD: +2.25
OD_SPHERE: +0.75
OD_ADD: +2.25
OS_AXIS: 094
OS_CYLINDER: -0.50
OS_SPHERE: +0.50
OD_CYLINDER: -1.75
SPECS_TYPE: PAL

## 2024-06-13 ASSESSMENT — ENCOUNTER SYMPTOMS
CARDIOVASCULAR NEGATIVE: 0
GASTROINTESTINAL NEGATIVE: 0
NEUROLOGICAL NEGATIVE: 0
PSYCHIATRIC NEGATIVE: 0
HEMATOLOGIC/LYMPHATIC NEGATIVE: 0
EYES NEGATIVE: 0
ENDOCRINE NEGATIVE: 0
MUSCULOSKELETAL NEGATIVE: 0
CONSTITUTIONAL NEGATIVE: 0
ALLERGIC/IMMUNOLOGIC NEGATIVE: 0
RESPIRATORY NEGATIVE: 0

## 2024-06-13 ASSESSMENT — PAIN SCALES - GENERAL: PAINLEVEL: 0-NO PAIN

## 2024-06-13 ASSESSMENT — PATIENT HEALTH QUESTIONNAIRE - PHQ9
1. LITTLE INTEREST OR PLEASURE IN DOING THINGS: NOT AT ALL
SUM OF ALL RESPONSES TO PHQ9 QUESTIONS 1 AND 2: 0
2. FEELING DOWN, DEPRESSED OR HOPELESS: NOT AT ALL

## 2024-06-13 ASSESSMENT — CUP TO DISC RATIO
OD_RATIO: 0.25
OS_RATIO: 0.25

## 2024-06-13 ASSESSMENT — EXTERNAL EXAM - LEFT EYE: OS_EXAM: NORMAL

## 2024-06-13 ASSESSMENT — EXTERNAL EXAM - RIGHT EYE: OD_EXAM: NORMAL

## 2024-06-13 NOTE — PROGRESS NOTES
Assessment/Plan   Problem List Items Addressed This Visit       Diabetes mellitus without complication (Multi) - Primary     Advised no signs of diabetic changes on exam. Recommend to continue best sugar control and on need for annual checkup. Understands role of good BP control and weight loss if applicable. Discussed some components of selected studies like WESDR, DCCT, and UKPDS to describe the likely course of diabetes and effects on the eyes.           Age-related nuclear cataract of both eyes     Non significant cataract noted on exam. Will plan to continue to monitor with serial exam.            Unspecified disorder of refraction     Discussed glasses prescription from refraction. Will provide if patient interested in keeping for records or to fill as a new set of glasses.               Provided reassurance regarding above diagnoses and care received in the office visit today. Discussed outcomes and options along with the importance of treatment compliance. Understands the importance of any follow up visits. Patient instructed to call/communicate with our office if any new issues, questions, or concerns.     Will plan to see back in 1 year full or sooner PRN

## 2024-06-13 NOTE — LETTER
June 13, 2024    Sheila Cheney MD  [id number]  [facility]  [facility] [id number]    Patient: Desiree Shepard   YOB: 1960   Date of Visit: 6/13/2024       Dear Dr. Sheila Cheney MD:    Thank you for referring Desiree Shepard to me for evaluation. Here is my assessment and plan of care:    Assessment/Plan:  Desiree was seen today for annual exam and decreased visual acuity.  Diagnoses and all orders for this visit:  Diabetes mellitus without complication (Multi) (Primary)  Age-related nuclear cataract of both eyes  Unspecified disorder of refraction    Right eye:     Left eye:    [x] No diabetes mellitus (DM) retinopathy [x] No diabetes mellitus (DM) retinopathy    [] Mild non-proliferative retinopathy [] Mild non-proliferative retinopathy    [] Moderate non-proliferative retinopathy [] Moderate non-proliferative retinopathy    [] Severe non-proliferative retinopathy [] Severe non-proliferative retinopathy    [] Proliferative retinopathy   [] Proliferative retinopathy    [] Diabetic macular edema   [] Diabetic macular edema    Urged patient to continue to work on best blood sugar and blood pressure control  Advised patient to call if any new vision changes noted    Will plan to repeat evaluation in:   [] 3 months [] 6 months [] 9 months [x] 12 months [] Other    Below you can find relevant pieces of the exam. If you have questions, please do not hesitate to call me. I look forward to following Desiree along with you.         Sincerely,        Alberto Betancur MD        CC:   No Recipients         External Exam         Right Left    External Normal Normal              Slit Lamp Exam         Right Left    Lids/Lashes Normal Normal    Conjunctiva/Sclera White and quiet White and quiet    Cornea Clear Clear    Anterior Chamber Deep and quiet Deep and quiet    Iris Round and reactive Round and reactive    Lens 1+ nuclear sclerosis 1+ nuclear sclerosis              Fundus Exam   "       Right Left    Vitreous Normal Normal    Disc Normal Normal    C/D Ratio 0.25 0.25    Macula Normal Normal    Vessels Normal Normal    Periphery Normal Normal                   <div id=\"[email or web address]\"></div>     "

## 2024-06-13 NOTE — PATIENT INSTRUCTIONS
Thank you so much for choosing me to provide your care today!    If you were dilated your vision may remain blurry   or light sensitive for several hours.    The nature of eye and vision problems can require frequent follow up, please make every effort to adhere to any future appointments.    If you have any issues, questions, or concerns,   please do not hesitate to reach out.    If you receive a survey in regards to your care today, please mention any exceptional care my office staff and/or technicians provided.    You can reach our office at this number:  [phone]

## 2024-07-26 DIAGNOSIS — E11.9 DIABETES MELLITUS WITHOUT COMPLICATION (MULTI): ICD-10-CM

## 2024-07-26 RX ORDER — BLOOD-GLUCOSE SENSOR
EACH MISCELLANEOUS
Qty: 3 EACH | Refills: 11 | Status: SHIPPED | OUTPATIENT
Start: 2024-07-26

## 2024-07-26 RX ORDER — GLIMEPIRIDE 2 MG/1
2 TABLET ORAL 2 TIMES DAILY
Qty: 180 TABLET | Refills: 3 | Status: SHIPPED | OUTPATIENT
Start: 2024-07-26 | End: 2025-07-26

## 2024-08-07 DIAGNOSIS — E11.9 DIABETES MELLITUS WITHOUT COMPLICATION (MULTI): ICD-10-CM

## 2024-08-07 RX ORDER — DULAGLUTIDE 4.5 MG/.5ML
4.5 INJECTION, SOLUTION SUBCUTANEOUS
Qty: 2 ML | Refills: 3 | Status: SHIPPED | OUTPATIENT
Start: 2024-08-11

## 2024-08-07 RX ORDER — DULAGLUTIDE 4.5 MG/.5ML
4.5 INJECTION, SOLUTION SUBCUTANEOUS
COMMUNITY
End: 2024-08-07 | Stop reason: SDUPTHER

## 2024-09-05 ENCOUNTER — APPOINTMENT (OUTPATIENT)
Dept: PRIMARY CARE | Facility: CLINIC | Age: 64
End: 2024-09-05
Payer: COMMERCIAL

## 2024-09-05 VITALS
DIASTOLIC BLOOD PRESSURE: 80 MMHG | HEIGHT: 61 IN | WEIGHT: 124 LBS | HEART RATE: 83 BPM | OXYGEN SATURATION: 96 % | SYSTOLIC BLOOD PRESSURE: 130 MMHG | RESPIRATION RATE: 16 BRPM | BODY MASS INDEX: 23.41 KG/M2

## 2024-09-05 DIAGNOSIS — M47.816 LUMBAR SPONDYLOSIS: Primary | ICD-10-CM

## 2024-09-05 DIAGNOSIS — E11.9 DIABETES MELLITUS WITHOUT COMPLICATION (MULTI): ICD-10-CM

## 2024-09-05 DIAGNOSIS — F33.1 MODERATE EPISODE OF RECURRENT MAJOR DEPRESSIVE DISORDER (MULTI): ICD-10-CM

## 2024-09-05 DIAGNOSIS — E78.2 MIXED HYPERLIPIDEMIA: ICD-10-CM

## 2024-09-05 DIAGNOSIS — Z13.29 SCREENING FOR HYPOTHYROIDISM: ICD-10-CM

## 2024-09-05 LAB — POC HEMOGLOBIN A1C: 7.4 % (ref 4.2–6.5)

## 2024-09-05 PROCEDURE — 3079F DIAST BP 80-89 MM HG: CPT | Performed by: INTERNAL MEDICINE

## 2024-09-05 PROCEDURE — 3075F SYST BP GE 130 - 139MM HG: CPT | Performed by: INTERNAL MEDICINE

## 2024-09-05 PROCEDURE — 3008F BODY MASS INDEX DOCD: CPT | Performed by: INTERNAL MEDICINE

## 2024-09-05 PROCEDURE — 83036 HEMOGLOBIN GLYCOSYLATED A1C: CPT | Performed by: INTERNAL MEDICINE

## 2024-09-05 PROCEDURE — 99214 OFFICE O/P EST MOD 30 MIN: CPT | Performed by: INTERNAL MEDICINE

## 2024-09-05 ASSESSMENT — ENCOUNTER SYMPTOMS
COUGH: 0
DEPRESSION: 0
ARTHRALGIAS: 1
NAUSEA: 0
LOSS OF SENSATION IN FEET: 0
ABDOMINAL PAIN: 0
DIARRHEA: 0
HEADACHES: 1
DIZZINESS: 0
CONSTIPATION: 0
SHORTNESS OF BREATH: 0
PALPITATIONS: 0
OCCASIONAL FEELINGS OF UNSTEADINESS: 0
BACK PAIN: 1

## 2024-09-05 ASSESSMENT — PAIN SCALES - GENERAL: PAINLEVEL: 0-NO PAIN

## 2024-09-05 NOTE — PROGRESS NOTES
"Subjective   Patient ID: Desiree Shepard is a 64 y.o. female who presents for 3 month check up.    Patient is feeling well overall. Blood sugars have been fluctuation from high to very low levels. Is drinking a lot of water. Occasionally, eats salty foods. Gets some exercise at work but doesn't follow an exercise regiment. Sees ophthalmologist and dentist regularly.    Diagnostics Reviewed: 1/8/2021 colonoscopy.    3/6/2024 Bi Mammo Bilateral normal.      Labs Reviewed: POCT hemoglobin A1c 7.4.     9/19/2023 Lipid Panel cholesterol 281 and LDLs 182.           Review of Systems   Respiratory:  Negative for cough and shortness of breath.    Cardiovascular:  Negative for chest pain and palpitations.   Gastrointestinal:  Negative for abdominal pain, constipation, diarrhea and nausea.   Musculoskeletal:  Positive for arthralgias and back pain.   Neurological:  Positive for headaches. Negative for dizziness.       Objective   /80   Pulse 83   Resp 16   Ht 1.549 m (5' 1\")   Wt 56.2 kg (124 lb)   SpO2 96%   BMI 23.43 kg/m²     Physical Exam  HENT:      Right Ear: Tympanic membrane normal. There is no impacted cerumen.      Left Ear: Tympanic membrane normal. There is no impacted cerumen.   Cardiovascular:      Rate and Rhythm: Normal rate and regular rhythm.      Heart sounds: Normal heart sounds.   Pulmonary:      Breath sounds: Normal breath sounds.   Abdominal:      Palpations: Abdomen is soft. There is no hepatomegaly.      Tenderness: There is no abdominal tenderness.   Musculoskeletal:      Right lower leg: No edema.      Left lower leg: No edema.   Neurological:      Mental Status: She is alert and oriented to person, place, and time.      Gait: Gait normal.   Psychiatric:         Mood and Affect: Mood normal.         Behavior: Behavior normal.         Assessment/Plan   Diagnoses and all orders for this visit:  Lumbar spondylosis  -     CBC and Auto Differential; Future  -     Comprehensive Metabolic Panel; " Future  -     Lipid Panel; Future  -     TSH with reflex to Free T4 if abnormal; Future  -     Albumin-Creatinine Ratio, Urine Random; Future  Diabetes mellitus without complication (Multi)  -     POCT glycosylated hemoglobin (Hb A1C) manually resulted  -     CBC and Auto Differential; Future  -     Comprehensive Metabolic Panel; Future  -     Lipid Panel; Future  -     TSH with reflex to Free T4 if abnormal; Future  -     Albumin-Creatinine Ratio, Urine Random; Future  Moderate episode of recurrent major depressive disorder (Multi)  -     CBC and Auto Differential; Future  -     Comprehensive Metabolic Panel; Future  -     Lipid Panel; Future  -     TSH with reflex to Free T4 if abnormal; Future  -     Albumin-Creatinine Ratio, Urine Random; Future  Mixed hyperlipidemia  -     CBC and Auto Differential; Future  -     Comprehensive Metabolic Panel; Future  -     Lipid Panel; Future  -     TSH with reflex to Free T4 if abnormal; Future  -     Albumin-Creatinine Ratio, Urine Random; Future  Screening for hypothyroidism  -     CBC and Auto Differential; Future  -     Comprehensive Metabolic Panel; Future  -     Lipid Panel; Future  -     TSH with reflex to Free T4 if abnormal; Future  -     Albumin-Creatinine Ratio, Urine Random; Future    Scribe Attestation  By signing my name below, I, Monica Christensen   attest that this documentation has been prepared under the direction and in the presence of Sheila Cheney MD.

## 2024-09-20 DIAGNOSIS — E78.2 MIXED HYPERLIPIDEMIA: ICD-10-CM

## 2024-09-20 RX ORDER — PRAVASTATIN SODIUM 10 MG/1
10 TABLET ORAL DAILY
Qty: 90 TABLET | Refills: 3 | Status: SHIPPED | OUTPATIENT
Start: 2024-09-20

## 2024-10-18 ENCOUNTER — LAB (OUTPATIENT)
Dept: LAB | Facility: LAB | Age: 64
End: 2024-10-18
Payer: COMMERCIAL

## 2024-10-18 DIAGNOSIS — F33.1 MODERATE EPISODE OF RECURRENT MAJOR DEPRESSIVE DISORDER: ICD-10-CM

## 2024-10-18 DIAGNOSIS — E11.9 DIABETES MELLITUS WITHOUT COMPLICATION (MULTI): ICD-10-CM

## 2024-10-18 DIAGNOSIS — E78.2 MIXED HYPERLIPIDEMIA: ICD-10-CM

## 2024-10-18 DIAGNOSIS — Z13.29 SCREENING FOR HYPOTHYROIDISM: ICD-10-CM

## 2024-10-18 DIAGNOSIS — M47.816 LUMBAR SPONDYLOSIS: ICD-10-CM

## 2024-10-18 LAB
ALBUMIN SERPL BCP-MCNC: 4.1 G/DL (ref 3.4–5)
ALP SERPL-CCNC: 90 U/L (ref 33–136)
ALT SERPL W P-5'-P-CCNC: 15 U/L (ref 7–45)
ANION GAP SERPL CALCULATED.3IONS-SCNC: 10 MMOL/L (ref 10–20)
AST SERPL W P-5'-P-CCNC: 19 U/L (ref 9–39)
BASOPHILS # BLD AUTO: 0.06 X10*3/UL (ref 0–0.1)
BASOPHILS NFR BLD AUTO: 0.9 %
BILIRUB SERPL-MCNC: 0.6 MG/DL (ref 0–1.2)
BUN SERPL-MCNC: 16 MG/DL (ref 6–23)
CALCIUM SERPL-MCNC: 9.3 MG/DL (ref 8.6–10.3)
CHLORIDE SERPL-SCNC: 102 MMOL/L (ref 98–107)
CHOLEST SERPL-MCNC: 218 MG/DL (ref 0–199)
CHOLEST/HDLC SERPL: 3.1 {RATIO}
CO2 SERPL-SCNC: 32 MMOL/L (ref 21–32)
CREAT SERPL-MCNC: 0.84 MG/DL (ref 0.5–1.05)
CREAT UR-MCNC: 144.5 MG/DL (ref 20–320)
EGFRCR SERPLBLD CKD-EPI 2021: 78 ML/MIN/1.73M*2
EOSINOPHIL # BLD AUTO: 0.1 X10*3/UL (ref 0–0.7)
EOSINOPHIL NFR BLD AUTO: 1.5 %
ERYTHROCYTE [DISTWIDTH] IN BLOOD BY AUTOMATED COUNT: 12.4 % (ref 11.5–14.5)
GLUCOSE SERPL-MCNC: 182 MG/DL (ref 74–99)
HCT VFR BLD AUTO: 42 % (ref 36–46)
HDLC SERPL-MCNC: 70.3 MG/DL
HGB BLD-MCNC: 13.6 G/DL (ref 12–16)
IMM GRANULOCYTES # BLD AUTO: 0.02 X10*3/UL (ref 0–0.7)
IMM GRANULOCYTES NFR BLD AUTO: 0.3 % (ref 0–0.9)
LDLC SERPL CALC-MCNC: 126 MG/DL
LYMPHOCYTES # BLD AUTO: 3.03 X10*3/UL (ref 1.2–4.8)
LYMPHOCYTES NFR BLD AUTO: 45.2 %
MCH RBC QN AUTO: 27.5 PG (ref 26–34)
MCHC RBC AUTO-ENTMCNC: 32.4 G/DL (ref 32–36)
MCV RBC AUTO: 85 FL (ref 80–100)
MICROALBUMIN UR-MCNC: 9.3 MG/L
MICROALBUMIN/CREAT UR: 6.4 UG/MG CREAT
MONOCYTES # BLD AUTO: 0.5 X10*3/UL (ref 0.1–1)
MONOCYTES NFR BLD AUTO: 7.5 %
NEUTROPHILS # BLD AUTO: 3 X10*3/UL (ref 1.2–7.7)
NEUTROPHILS NFR BLD AUTO: 44.6 %
NON HDL CHOLESTEROL: 148 MG/DL (ref 0–149)
NRBC BLD-RTO: 0 /100 WBCS (ref 0–0)
PLATELET # BLD AUTO: 336 X10*3/UL (ref 150–450)
POTASSIUM SERPL-SCNC: 5 MMOL/L (ref 3.5–5.3)
PROT SERPL-MCNC: 6.7 G/DL (ref 6.4–8.2)
RBC # BLD AUTO: 4.94 X10*6/UL (ref 4–5.2)
SODIUM SERPL-SCNC: 139 MMOL/L (ref 136–145)
TRIGL SERPL-MCNC: 107 MG/DL (ref 0–149)
TSH SERPL-ACNC: 0.84 MIU/L (ref 0.44–3.98)
VLDL: 21 MG/DL (ref 0–40)
WBC # BLD AUTO: 6.7 X10*3/UL (ref 4.4–11.3)

## 2024-10-18 PROCEDURE — 80061 LIPID PANEL: CPT

## 2024-10-18 PROCEDURE — 84443 ASSAY THYROID STIM HORMONE: CPT

## 2024-10-18 PROCEDURE — 82043 UR ALBUMIN QUANTITATIVE: CPT

## 2024-10-18 PROCEDURE — 85025 COMPLETE CBC W/AUTO DIFF WBC: CPT

## 2024-10-18 PROCEDURE — 36415 COLL VENOUS BLD VENIPUNCTURE: CPT

## 2024-10-18 PROCEDURE — 80053 COMPREHEN METABOLIC PANEL: CPT

## 2024-10-18 PROCEDURE — 82570 ASSAY OF URINE CREATININE: CPT

## 2024-10-20 RX ORDER — PRAVASTATIN SODIUM 20 MG/1
20 TABLET ORAL DAILY
Qty: 90 TABLET | Refills: 3 | Status: SHIPPED | OUTPATIENT
Start: 2024-10-20 | End: 2025-10-20

## 2024-10-21 NOTE — RESULT ENCOUNTER NOTE
Labs okay, cholesterol improved, but ideally LDL should be less than 75, so I recommend to follow low-fat diet and increase the statin dose, I sent new prescription

## 2024-12-05 ENCOUNTER — OFFICE VISIT (OUTPATIENT)
Dept: PRIMARY CARE | Facility: CLINIC | Age: 64
End: 2024-12-05
Payer: COMMERCIAL

## 2024-12-05 VITALS
HEART RATE: 95 BPM | WEIGHT: 134 LBS | DIASTOLIC BLOOD PRESSURE: 66 MMHG | SYSTOLIC BLOOD PRESSURE: 134 MMHG | HEIGHT: 61 IN | BODY MASS INDEX: 25.3 KG/M2 | RESPIRATION RATE: 16 BRPM | OXYGEN SATURATION: 97 %

## 2024-12-05 DIAGNOSIS — R68.2 DRY MOUTH: ICD-10-CM

## 2024-12-05 DIAGNOSIS — E11.9 DIABETES MELLITUS WITHOUT COMPLICATION (MULTI): ICD-10-CM

## 2024-12-05 DIAGNOSIS — F41.9 ANXIETY: ICD-10-CM

## 2024-12-05 DIAGNOSIS — E78.2 MIXED HYPERLIPIDEMIA: ICD-10-CM

## 2024-12-05 DIAGNOSIS — F33.1 MODERATE EPISODE OF RECURRENT MAJOR DEPRESSIVE DISORDER: Primary | ICD-10-CM

## 2024-12-05 LAB — POC HEMOGLOBIN A1C: 7.2 % (ref 4.2–6.5)

## 2024-12-05 PROCEDURE — 3049F LDL-C 100-129 MG/DL: CPT | Performed by: INTERNAL MEDICINE

## 2024-12-05 PROCEDURE — 83036 HEMOGLOBIN GLYCOSYLATED A1C: CPT | Performed by: INTERNAL MEDICINE

## 2024-12-05 PROCEDURE — 3061F NEG MICROALBUMINURIA REV: CPT | Performed by: INTERNAL MEDICINE

## 2024-12-05 PROCEDURE — 3075F SYST BP GE 130 - 139MM HG: CPT | Performed by: INTERNAL MEDICINE

## 2024-12-05 PROCEDURE — 3078F DIAST BP <80 MM HG: CPT | Performed by: INTERNAL MEDICINE

## 2024-12-05 PROCEDURE — 99214 OFFICE O/P EST MOD 30 MIN: CPT | Performed by: INTERNAL MEDICINE

## 2024-12-05 PROCEDURE — 3008F BODY MASS INDEX DOCD: CPT | Performed by: INTERNAL MEDICINE

## 2024-12-05 RX ORDER — PAROXETINE HYDROCHLORIDE 40 MG/1
60 TABLET, FILM COATED ORAL DAILY
Qty: 135 TABLET | Refills: 3 | Status: SHIPPED | OUTPATIENT
Start: 2024-12-05

## 2024-12-05 ASSESSMENT — ENCOUNTER SYMPTOMS
DEPRESSION: 0
SHORTNESS OF BREATH: 0
ABDOMINAL PAIN: 0
PALPITATIONS: 0
LOSS OF SENSATION IN FEET: 0
OCCASIONAL FEELINGS OF UNSTEADINESS: 0
DIARRHEA: 0
COUGH: 0
CONSTIPATION: 0
DIZZINESS: 0
NERVOUS/ANXIOUS: 1
NAUSEA: 0
ARTHRALGIAS: 1

## 2024-12-05 ASSESSMENT — PAIN SCALES - GENERAL: PAINLEVEL_OUTOF10: 5

## 2024-12-05 NOTE — PATIENT INSTRUCTIONS
When you come into your next visit, please bring in a piece of paper with the name of your sister's two blood diseases.

## 2024-12-05 NOTE — PROGRESS NOTES
"Subjective   Patient ID: Desiree Shepard is a 64 y.o. female who presents for 3 month check up.    Patient has been under a lot of stress due to recent life stress factors. Has been worried about her blood sugars and normally sees values around 200. Snacks before going to bed. Has been drinking plenty of water. Sometimes, her bones hurt. Follows a healthy diet and tries to get exercise. Sees ophthalmologist and dentist regularly. Counseled on managing blood sugar levels.     Diagnostics Reviewed: 1/8/2021 Colonoscopy.        3/6/2024 BI Mammo Bilateral: was normal.   Labs Reviewed: 9/5/2024 Hemoglobin A1C: 7.4.      10/18/2024 Lipid Panel: cholesterol 218 and .      12/5/2024 Hemoglobin A1C: 7.2.          Review of Systems   HENT:          Dry eyes and dry mouth   Respiratory:  Negative for cough and shortness of breath.    Cardiovascular:  Negative for chest pain and palpitations.   Gastrointestinal:  Negative for abdominal pain, constipation, diarrhea and nausea.   Musculoskeletal:  Positive for arthralgias.   Neurological:  Negative for dizziness.   Psychiatric/Behavioral:  Positive for behavioral problems (depression). The patient is nervous/anxious.        Objective   /66   Pulse 95   Resp 16   Ht 1.549 m (5' 1\")   Wt 60.8 kg (134 lb)   SpO2 97%   BMI 25.32 kg/m²     Physical Exam  HENT:      Right Ear: Tympanic membrane normal. There is no impacted cerumen.      Left Ear: Tympanic membrane normal. There is no impacted cerumen.      Mouth/Throat:      Pharynx: Oropharynx is clear. No oropharyngeal exudate.   Neck:      Vascular: No carotid bruit.   Cardiovascular:      Rate and Rhythm: Normal rate and regular rhythm.      Heart sounds: Normal heart sounds.   Pulmonary:      Breath sounds: Normal breath sounds.   Abdominal:      Palpations: Abdomen is soft. There is no hepatomegaly.      Tenderness: There is no abdominal tenderness.   Musculoskeletal:      Right lower leg: No edema.      Left " lower leg: No edema.   Lymphadenopathy:      Cervical: No cervical adenopathy.   Neurological:      Mental Status: She is alert and oriented to person, place, and time.      Gait: Gait normal.   Psychiatric:         Mood and Affect: Mood normal.         Behavior: Behavior normal.       Assessment/Plan   Diagnoses and all orders for this visit:  Moderate episode of recurrent major depressive disorder  Diabetes mellitus without complication (Multi)  -     POCT glycosylated hemoglobin (Hb A1C) manually resulted  -     empagliflozin (Jardiance) 25 mg; Take 1 tablet (25 mg) by mouth once daily.  -     Albumin-Creatinine Ratio, Urine Random; Future  Mixed hyperlipidemia  Anxiety  -     PARoxetine (Paxil) 40 mg tablet; Take 1.5 tablets (60 mg) by mouth once daily.  Dry mouth  -     Anti-SSA; Future  -     Anti-SSB; Future  -     KAJAL without Reflex BELÉN; Future    Scribe Attestation  By signing my name below, IKeyur Scribe   attest that this documentation has been prepared under the direction and in the presence of Sheila Cheney MD.

## 2025-02-18 ENCOUNTER — APPOINTMENT (OUTPATIENT)
Dept: CARDIOLOGY | Facility: HOSPITAL | Age: 65
End: 2025-02-18
Payer: COMMERCIAL

## 2025-02-18 ENCOUNTER — HOSPITAL ENCOUNTER (INPATIENT)
Facility: HOSPITAL | Age: 65
End: 2025-02-18
Attending: STUDENT IN AN ORGANIZED HEALTH CARE EDUCATION/TRAINING PROGRAM | Admitting: SURGERY
Payer: COMMERCIAL

## 2025-02-18 ENCOUNTER — APPOINTMENT (OUTPATIENT)
Dept: RADIOLOGY | Facility: HOSPITAL | Age: 65
End: 2025-02-18
Payer: COMMERCIAL

## 2025-02-18 ENCOUNTER — OFFICE VISIT (OUTPATIENT)
Dept: URGENT CARE | Age: 65
End: 2025-02-18
Payer: COMMERCIAL

## 2025-02-18 VITALS
HEIGHT: 61 IN | BODY MASS INDEX: 22.66 KG/M2 | WEIGHT: 120 LBS | SYSTOLIC BLOOD PRESSURE: 138 MMHG | TEMPERATURE: 96.2 F | OXYGEN SATURATION: 97 % | HEART RATE: 88 BPM | RESPIRATION RATE: 16 BRPM | DIASTOLIC BLOOD PRESSURE: 89 MMHG

## 2025-02-18 DIAGNOSIS — J96.01 ACUTE RESPIRATORY FAILURE WITH HYPOXIA (MULTI): Primary | ICD-10-CM

## 2025-02-18 DIAGNOSIS — I21.4 NSTEMI (NON-ST ELEVATED MYOCARDIAL INFARCTION) (MULTI): ICD-10-CM

## 2025-02-18 DIAGNOSIS — J10.1 INFLUENZA A: ICD-10-CM

## 2025-02-18 DIAGNOSIS — R79.89 ELEVATED TROPONIN: ICD-10-CM

## 2025-02-18 DIAGNOSIS — R09.81 NASAL CONGESTION: ICD-10-CM

## 2025-02-18 DIAGNOSIS — R07.9 CHEST PAIN, UNSPECIFIED TYPE: ICD-10-CM

## 2025-02-18 DIAGNOSIS — I42.9 CARDIOMYOPATHY, UNSPECIFIED: ICD-10-CM

## 2025-02-18 DIAGNOSIS — R05.1 ACUTE COUGH: Primary | ICD-10-CM

## 2025-02-18 LAB
ANION GAP BLDV CALCULATED.4IONS-SCNC: 16 MMOL/L (ref 10–25)
ANION GAP SERPL CALCULATED.3IONS-SCNC: 20 MMOL/L (ref 10–20)
APTT PPP: 57.1 SECONDS (ref 22–32.5)
APTT PPP: 60.1 SECONDS (ref 22–32.5)
APTT PPP: >139 SECONDS (ref 22–32.5)
ATRIAL RATE: 85 BPM
ATRIAL RATE: 91 BPM
B-OH-BUTYR SERPL-SCNC: 3.06 MMOL/L (ref 0.02–0.27)
BASE EXCESS BLDV CALC-SCNC: -3.7 MMOL/L (ref -2–3)
BASOPHILS # BLD AUTO: 0.03 X10*3/UL (ref 0–0.1)
BASOPHILS NFR BLD AUTO: 0.5 %
BODY TEMPERATURE: 37 DEGREES CELSIUS
BUN SERPL-MCNC: 20 MG/DL (ref 6–23)
CA-I BLDV-SCNC: 1.13 MMOL/L (ref 1.1–1.33)
CALCIUM SERPL-MCNC: 9.2 MG/DL (ref 8.6–10.3)
CARDIAC TROPONIN I PNL SERPL HS: 382 NG/L (ref 0–13)
CARDIAC TROPONIN I PNL SERPL HS: 954 NG/L (ref 0–13)
CHLORIDE BLDV-SCNC: 95 MMOL/L (ref 98–107)
CHLORIDE SERPL-SCNC: 95 MMOL/L (ref 98–107)
CO2 SERPL-SCNC: 24 MMOL/L (ref 21–32)
CREAT SERPL-MCNC: 1.02 MG/DL (ref 0.5–1.05)
EGFRCR SERPLBLD CKD-EPI 2021: 62 ML/MIN/1.73M*2
EOSINOPHIL # BLD AUTO: 0.01 X10*3/UL (ref 0–0.7)
EOSINOPHIL NFR BLD AUTO: 0.2 %
ERYTHROCYTE [DISTWIDTH] IN BLOOD BY AUTOMATED COUNT: 12.5 % (ref 11.5–14.5)
ERYTHROCYTE [DISTWIDTH] IN BLOOD BY AUTOMATED COUNT: 12.5 % (ref 11.5–14.5)
GLUCOSE BLD MANUAL STRIP-MCNC: 103 MG/DL (ref 74–99)
GLUCOSE BLD MANUAL STRIP-MCNC: 414 MG/DL (ref 74–99)
GLUCOSE BLDV-MCNC: 503 MG/DL (ref 74–99)
GLUCOSE SERPL-MCNC: 517 MG/DL (ref 74–99)
HCO3 BLDV-SCNC: 23.9 MMOL/L (ref 22–26)
HCT VFR BLD AUTO: 41.9 % (ref 36–46)
HCT VFR BLD AUTO: 45.5 % (ref 36–46)
HCT VFR BLD EST: 45 % (ref 36–46)
HGB BLD-MCNC: 14 G/DL (ref 12–16)
HGB BLD-MCNC: 14.9 G/DL (ref 12–16)
HGB BLDV-MCNC: 15.1 G/DL (ref 12–16)
IMM GRANULOCYTES # BLD AUTO: 0.02 X10*3/UL (ref 0–0.7)
IMM GRANULOCYTES NFR BLD AUTO: 0.4 % (ref 0–0.9)
INHALED O2 CONCENTRATION: 20 %
LACTATE BLDV-SCNC: 1.8 MMOL/L (ref 0.4–2)
LACTATE BLDV-SCNC: 2.8 MMOL/L (ref 0.4–2)
LYMPHOCYTES # BLD AUTO: 1.49 X10*3/UL (ref 1.2–4.8)
LYMPHOCYTES NFR BLD AUTO: 27.2 %
MAGNESIUM SERPL-MCNC: 2.17 MG/DL (ref 1.6–2.4)
MCH RBC QN AUTO: 27.9 PG (ref 26–34)
MCH RBC QN AUTO: 28.1 PG (ref 26–34)
MCHC RBC AUTO-ENTMCNC: 32.7 G/DL (ref 32–36)
MCHC RBC AUTO-ENTMCNC: 33.4 G/DL (ref 32–36)
MCV RBC AUTO: 84 FL (ref 80–100)
MCV RBC AUTO: 86 FL (ref 80–100)
MONOCYTES # BLD AUTO: 0.39 X10*3/UL (ref 0.1–1)
MONOCYTES NFR BLD AUTO: 7.1 %
NEUTROPHILS # BLD AUTO: 3.54 X10*3/UL (ref 1.2–7.7)
NEUTROPHILS NFR BLD AUTO: 64.6 %
NRBC BLD-RTO: 0 /100 WBCS (ref 0–0)
NRBC BLD-RTO: 0 /100 WBCS (ref 0–0)
OXYHGB MFR BLDV: 48.2 % (ref 45–75)
P AXIS: 59 DEGREES
P AXIS: 71 DEGREES
P OFFSET: 204 MS
P OFFSET: 207 MS
P ONSET: 149 MS
P ONSET: 156 MS
PCO2 BLDV: 52 MM HG (ref 41–51)
PH BLDV: 7.27 PH (ref 7.33–7.43)
PLATELET # BLD AUTO: 259 X10*3/UL (ref 150–450)
PLATELET # BLD AUTO: 261 X10*3/UL (ref 150–450)
PO2 BLDV: 28 MM HG (ref 35–45)
POC BINAX EXPIRATION: NORMAL
POC BINAX NOW COVID SERIAL NUMBER: NORMAL
POC RAPID INFLUENZA A: POSITIVE
POC RAPID INFLUENZA B: NEGATIVE
POC SARS-COV-2 AG BINAX: NORMAL
POTASSIUM BLDV-SCNC: 4.5 MMOL/L (ref 3.5–5.3)
POTASSIUM SERPL-SCNC: 4.5 MMOL/L (ref 3.5–5.3)
PR INTERVAL: 134 MS
PR INTERVAL: 140 MS
Q ONSET: 219 MS
Q ONSET: 223 MS
QRS COUNT: 14 BEATS
QRS COUNT: 15 BEATS
QRS DURATION: 108 MS
QRS DURATION: 98 MS
QT INTERVAL: 428 MS
QT INTERVAL: 450 MS
QTC CALCULATION(BAZETT): 526 MS
QTC CALCULATION(BAZETT): 535 MS
QTC FREDERICIA: 492 MS
QTC FREDERICIA: 505 MS
R AXIS: -11 DEGREES
R AXIS: -44 DEGREES
RBC # BLD AUTO: 5.01 X10*6/UL (ref 4–5.2)
RBC # BLD AUTO: 5.31 X10*6/UL (ref 4–5.2)
SAO2 % BLDV: 49 % (ref 45–75)
SODIUM BLDV-SCNC: 130 MMOL/L (ref 136–145)
SODIUM SERPL-SCNC: 134 MMOL/L (ref 136–145)
T AXIS: 72 DEGREES
T AXIS: 74 DEGREES
T OFFSET: 437 MS
T OFFSET: 444 MS
TSH SERPL-ACNC: 1.38 MIU/L (ref 0.44–3.98)
VENTRICULAR RATE: 85 BPM
VENTRICULAR RATE: 91 BPM
WBC # BLD AUTO: 5.5 X10*3/UL (ref 4.4–11.3)
WBC # BLD AUTO: 9.2 X10*3/UL (ref 4.4–11.3)

## 2025-02-18 PROCEDURE — 93005 ELECTROCARDIOGRAM TRACING: CPT

## 2025-02-18 PROCEDURE — 93306 TTE W/DOPPLER COMPLETE: CPT | Performed by: INTERNAL MEDICINE

## 2025-02-18 PROCEDURE — 36415 COLL VENOUS BLD VENIPUNCTURE: CPT

## 2025-02-18 PROCEDURE — 2500000004 HC RX 250 GENERAL PHARMACY W/ HCPCS (ALT 636 FOR OP/ED)

## 2025-02-18 PROCEDURE — 2060000001 HC INTERMEDIATE ICU ROOM DAILY

## 2025-02-18 PROCEDURE — 85730 THROMBOPLASTIN TIME PARTIAL: CPT | Performed by: STUDENT IN AN ORGANIZED HEALTH CARE EDUCATION/TRAINING PROGRAM

## 2025-02-18 PROCEDURE — 83880 ASSAY OF NATRIURETIC PEPTIDE: CPT | Mod: WESLAB | Performed by: INTERNAL MEDICINE

## 2025-02-18 PROCEDURE — 84484 ASSAY OF TROPONIN QUANT: CPT

## 2025-02-18 PROCEDURE — 83605 ASSAY OF LACTIC ACID: CPT

## 2025-02-18 PROCEDURE — 2500000004 HC RX 250 GENERAL PHARMACY W/ HCPCS (ALT 636 FOR OP/ED): Performed by: STUDENT IN AN ORGANIZED HEALTH CARE EDUCATION/TRAINING PROGRAM

## 2025-02-18 PROCEDURE — 82330 ASSAY OF CALCIUM: CPT

## 2025-02-18 PROCEDURE — 71046 X-RAY EXAM CHEST 2 VIEWS: CPT

## 2025-02-18 PROCEDURE — 84443 ASSAY THYROID STIM HORMONE: CPT | Performed by: INTERNAL MEDICINE

## 2025-02-18 PROCEDURE — 80048 BASIC METABOLIC PNL TOTAL CA: CPT

## 2025-02-18 PROCEDURE — 2500000002 HC RX 250 W HCPCS SELF ADMINISTERED DRUGS (ALT 637 FOR MEDICARE OP, ALT 636 FOR OP/ED)

## 2025-02-18 PROCEDURE — 99291 CRITICAL CARE FIRST HOUR: CPT | Mod: 25

## 2025-02-18 PROCEDURE — 93356 MYOCRD STRAIN IMG SPCKL TRCK: CPT | Performed by: INTERNAL MEDICINE

## 2025-02-18 PROCEDURE — 82947 ASSAY GLUCOSE BLOOD QUANT: CPT

## 2025-02-18 PROCEDURE — 2500000001 HC RX 250 WO HCPCS SELF ADMINISTERED DRUGS (ALT 637 FOR MEDICARE OP)

## 2025-02-18 PROCEDURE — 85730 THROMBOPLASTIN TIME PARTIAL: CPT

## 2025-02-18 PROCEDURE — 76376 3D RENDER W/INTRP POSTPROCES: CPT | Performed by: INTERNAL MEDICINE

## 2025-02-18 PROCEDURE — 85027 COMPLETE CBC AUTOMATED: CPT

## 2025-02-18 PROCEDURE — 82810 BLOOD GASES O2 SAT ONLY: CPT

## 2025-02-18 PROCEDURE — 71046 X-RAY EXAM CHEST 2 VIEWS: CPT | Performed by: RADIOLOGY

## 2025-02-18 PROCEDURE — 96374 THER/PROPH/DIAG INJ IV PUSH: CPT

## 2025-02-18 PROCEDURE — 96375 TX/PRO/DX INJ NEW DRUG ADDON: CPT

## 2025-02-18 PROCEDURE — 83036 HEMOGLOBIN GLYCOSYLATED A1C: CPT | Mod: WESLAB | Performed by: INTERNAL MEDICINE

## 2025-02-18 PROCEDURE — 99285 EMERGENCY DEPT VISIT HI MDM: CPT | Mod: 25 | Performed by: STUDENT IN AN ORGANIZED HEALTH CARE EDUCATION/TRAINING PROGRAM

## 2025-02-18 PROCEDURE — 83735 ASSAY OF MAGNESIUM: CPT

## 2025-02-18 PROCEDURE — 82010 KETONE BODYS QUAN: CPT

## 2025-02-18 PROCEDURE — 99223 1ST HOSP IP/OBS HIGH 75: CPT | Performed by: INTERNAL MEDICINE

## 2025-02-18 PROCEDURE — 99222 1ST HOSP IP/OBS MODERATE 55: CPT | Performed by: INTERNAL MEDICINE

## 2025-02-18 PROCEDURE — 85025 COMPLETE CBC W/AUTO DIFF WBC: CPT

## 2025-02-18 PROCEDURE — 76376 3D RENDER W/INTRP POSTPROCES: CPT

## 2025-02-18 RX ORDER — PANTOPRAZOLE SODIUM 40 MG/1
40 TABLET, DELAYED RELEASE ORAL
Status: DISCONTINUED | OUTPATIENT
Start: 2025-02-19 | End: 2025-02-20

## 2025-02-18 RX ORDER — HEPARIN SODIUM 10000 [USP'U]/100ML
0-4000 INJECTION, SOLUTION INTRAVENOUS CONTINUOUS
Status: DISCONTINUED | OUTPATIENT
Start: 2025-02-18 | End: 2025-02-19

## 2025-02-18 RX ORDER — TRAZODONE HYDROCHLORIDE 100 MG/1
200 TABLET ORAL NIGHTLY
Status: DISCONTINUED | OUTPATIENT
Start: 2025-02-18 | End: 2025-02-27 | Stop reason: HOSPADM

## 2025-02-18 RX ORDER — LANOLIN ALCOHOL/MO/W.PET/CERES
1000 CREAM (GRAM) TOPICAL DAILY
COMMUNITY

## 2025-02-18 RX ORDER — HEPARIN SODIUM 10000 [USP'U]/100ML
0-4000 INJECTION, SOLUTION INTRAVENOUS CONTINUOUS
Status: DISPENSED | OUTPATIENT
Start: 2025-02-18 | End: 2025-02-18

## 2025-02-18 RX ORDER — ACETAMINOPHEN 500 MG
1000 TABLET ORAL ONCE
Status: COMPLETED | OUTPATIENT
Start: 2025-02-18 | End: 2025-02-18

## 2025-02-18 RX ORDER — IPRATROPIUM BROMIDE AND ALBUTEROL SULFATE 2.5; .5 MG/3ML; MG/3ML
3 SOLUTION RESPIRATORY (INHALATION) EVERY 6 HOURS PRN
Status: DISCONTINUED | OUTPATIENT
Start: 2025-02-18 | End: 2025-02-27 | Stop reason: HOSPADM

## 2025-02-18 RX ORDER — HEPARIN SODIUM 5000 [USP'U]/ML
INJECTION, SOLUTION INTRAVENOUS; SUBCUTANEOUS AS NEEDED
Status: DISCONTINUED | OUTPATIENT
Start: 2025-02-18 | End: 2025-02-19

## 2025-02-18 RX ORDER — HEPARIN SODIUM 5000 [USP'U]/ML
60 INJECTION, SOLUTION INTRAVENOUS; SUBCUTANEOUS ONCE
Status: COMPLETED | OUTPATIENT
Start: 2025-02-18 | End: 2025-02-18

## 2025-02-18 RX ORDER — INSULIN LISPRO 100 [IU]/ML
0-10 INJECTION, SOLUTION INTRAVENOUS; SUBCUTANEOUS
Status: DISCONTINUED | OUTPATIENT
Start: 2025-02-18 | End: 2025-02-19

## 2025-02-18 RX ORDER — BENZONATATE 200 MG/1
200 CAPSULE ORAL 3 TIMES DAILY PRN
Qty: 30 CAPSULE | Refills: 0 | Status: ON HOLD | OUTPATIENT
Start: 2025-02-18 | End: 2025-02-25

## 2025-02-18 RX ORDER — GUAIFENESIN 1200 MG/1
1200 TABLET, EXTENDED RELEASE ORAL EVERY 12 HOURS PRN
Qty: 14 TABLET | Refills: 0 | Status: ON HOLD | OUTPATIENT
Start: 2025-02-18 | End: 2025-02-25

## 2025-02-18 RX ORDER — ONDANSETRON HYDROCHLORIDE 2 MG/ML
4 INJECTION, SOLUTION INTRAVENOUS EVERY 6 HOURS PRN
Status: DISCONTINUED | OUTPATIENT
Start: 2025-02-18 | End: 2025-02-19

## 2025-02-18 RX ORDER — ONDANSETRON HYDROCHLORIDE 2 MG/ML
4 INJECTION, SOLUTION INTRAVENOUS ONCE
Status: COMPLETED | OUTPATIENT
Start: 2025-02-18 | End: 2025-02-18

## 2025-02-18 RX ORDER — HEPARIN SODIUM 5000 [USP'U]/ML
INJECTION, SOLUTION INTRAVENOUS; SUBCUTANEOUS AS NEEDED
Status: ACTIVE | OUTPATIENT
Start: 2025-02-18 | End: 2025-02-18

## 2025-02-18 RX ORDER — DEXTROSE 50 % IN WATER (D50W) INTRAVENOUS SYRINGE
25
Status: DISCONTINUED | OUTPATIENT
Start: 2025-02-18 | End: 2025-02-21

## 2025-02-18 RX ORDER — DEXTROSE 50 % IN WATER (D50W) INTRAVENOUS SYRINGE
12.5
Status: DISCONTINUED | OUTPATIENT
Start: 2025-02-18 | End: 2025-02-21

## 2025-02-18 RX ORDER — NAPROXEN SODIUM 220 MG/1
81 TABLET, FILM COATED ORAL DAILY
Status: DISCONTINUED | OUTPATIENT
Start: 2025-02-19 | End: 2025-02-27 | Stop reason: HOSPADM

## 2025-02-18 RX ORDER — NAPROXEN SODIUM 220 MG/1
324 TABLET, FILM COATED ORAL ONCE
Status: COMPLETED | OUTPATIENT
Start: 2025-02-18 | End: 2025-02-18

## 2025-02-18 RX ORDER — PAROXETINE HYDROCHLORIDE 20 MG/1
60 TABLET, FILM COATED ORAL DAILY
Status: DISCONTINUED | OUTPATIENT
Start: 2025-02-19 | End: 2025-02-27 | Stop reason: HOSPADM

## 2025-02-18 RX ADMIN — ONDANSETRON 4 MG: 2 INJECTION, SOLUTION INTRAMUSCULAR; INTRAVENOUS at 23:36

## 2025-02-18 RX ADMIN — ONDANSETRON 4 MG: 2 INJECTION, SOLUTION INTRAMUSCULAR; INTRAVENOUS at 12:32

## 2025-02-18 RX ADMIN — HEPARIN SODIUM 700 UNITS/HR: 10000 INJECTION, SOLUTION INTRAVENOUS at 12:25

## 2025-02-18 RX ADMIN — ACETAMINOPHEN 1000 MG: 500 TABLET, FILM COATED ORAL at 10:07

## 2025-02-18 RX ADMIN — PERFLUTREN 4 ML OF DILUTION: 6.52 INJECTION, SUSPENSION INTRAVENOUS at 15:33

## 2025-02-18 RX ADMIN — ASPIRIN 324 MG: 81 TABLET, CHEWABLE ORAL at 11:17

## 2025-02-18 RX ADMIN — SODIUM CHLORIDE 1000 ML: 900 INJECTION, SOLUTION INTRAVENOUS at 12:30

## 2025-02-18 RX ADMIN — HEPARIN SODIUM 3250 UNITS: 5000 INJECTION, SOLUTION INTRAVENOUS; SUBCUTANEOUS at 12:28

## 2025-02-18 RX ADMIN — INSULIN HUMAN 10 UNITS: 100 INJECTION, SOLUTION PARENTERAL at 12:32

## 2025-02-18 ASSESSMENT — ENCOUNTER SYMPTOMS
DIFFICULTY URINATING: 0
NAUSEA: 0
SHORTNESS OF BREATH: 1
DIZZINESS: 0
OCCASIONAL FEELINGS OF UNSTEADINESS: 0
DEPRESSION: 0
AGITATION: 0
HEADACHES: 0
COUGH: 1
CHILLS: 1
DYSPHORIC MOOD: 0
CHEST TIGHTNESS: 1
LOSS OF SENSATION IN FEET: 0
COUGH: 1
COLOR CHANGE: 0
DIARRHEA: 0
ABDOMINAL PAIN: 0
EYE DISCHARGE: 0
DYSURIA: 0
RHINORRHEA: 1
SINUS PRESSURE: 1
MYALGIAS: 1
FATIGUE: 1

## 2025-02-18 ASSESSMENT — PAIN DESCRIPTION - LOCATION
LOCATION: CHEST
LOCATION: CHEST

## 2025-02-18 ASSESSMENT — PAIN SCALES - GENERAL
PAINLEVEL_OUTOF10: 7
PAINLEVEL_OUTOF10: 9
PAINLEVEL_OUTOF10: 7

## 2025-02-18 ASSESSMENT — PAIN DESCRIPTION - FREQUENCY: FREQUENCY: CONSTANT/CONTINUOUS

## 2025-02-18 ASSESSMENT — LIFESTYLE VARIABLES
EVER FELT BAD OR GUILTY ABOUT YOUR DRINKING: NO
HAVE YOU EVER FELT YOU SHOULD CUT DOWN ON YOUR DRINKING: NO
EVER HAD A DRINK FIRST THING IN THE MORNING TO STEADY YOUR NERVES TO GET RID OF A HANGOVER: NO
HAVE PEOPLE ANNOYED YOU BY CRITICIZING YOUR DRINKING: NO
TOTAL SCORE: 0

## 2025-02-18 ASSESSMENT — COLUMBIA-SUICIDE SEVERITY RATING SCALE - C-SSRS
6. HAVE YOU EVER DONE ANYTHING, STARTED TO DO ANYTHING, OR PREPARED TO DO ANYTHING TO END YOUR LIFE?: NO
1. IN THE PAST MONTH, HAVE YOU WISHED YOU WERE DEAD OR WISHED YOU COULD GO TO SLEEP AND NOT WAKE UP?: NO
2. HAVE YOU ACTUALLY HAD ANY THOUGHTS OF KILLING YOURSELF?: NO

## 2025-02-18 ASSESSMENT — PAIN DESCRIPTION - DESCRIPTORS: DESCRIPTORS: PRESSURE

## 2025-02-18 ASSESSMENT — PAIN DESCRIPTION - ORIENTATION: ORIENTATION: RIGHT;LEFT

## 2025-02-18 ASSESSMENT — PAIN DESCRIPTION - PAIN TYPE: TYPE: ACUTE PAIN

## 2025-02-18 ASSESSMENT — PAIN - FUNCTIONAL ASSESSMENT: PAIN_FUNCTIONAL_ASSESSMENT: 0-10

## 2025-02-18 NOTE — ASSESSMENT & PLAN NOTE
Started on heparin drip  Cardiology following  Tentative Cardiac cath in am  Loaded with 324 mg of asa  Asa daily  Lipid panel in am  TSH and A1C    Dm type 2:  fairly controlled  Patient on Jardiance and Trulicity  Hold both meds  BGL is poorly controlled on admission.  IV fluid was given  Insulin sliding scale ordered and patient placed on DM diet    Depression/Anxiety:  resume home medications

## 2025-02-18 NOTE — PROGRESS NOTES
MSW called patient to complete assessment at this time; however, no answer. Care Transitions will attempt again at another time.

## 2025-02-18 NOTE — PROGRESS NOTES
"Subjective   Patient ID: Desiree Shepard is a 64 y.o. female. They present today with a chief complaint of Cough (Patient c/o cough, chest pain, nasal congestion. X 4 days. ).    History of Present Illness    Cough  Associated symptoms include postnasal drip and rhinorrhea.       Past Medical History  Allergies as of 02/18/2025 - Reviewed 02/18/2025   Allergen Reaction Noted    Penicillin Other 09/11/2023    Statins-hmg-coa reductase inhibitors Myalgia 09/11/2023    Sulfamethoxazole-trimethoprim Other 09/11/2023       (Not in a hospital admission)       Past Medical History:   Diagnosis Date    Acid reflux     Age-related nuclear cataract of both eyes 6/13/2024    Diabetes mellitus (Multi)     Herpes     Hiatal hernia     High cholesterol     History of HPV infection     IBS (irritable bowel syndrome)     Insomnia     Rosacea     Unspecified disorder of refraction 6/13/2024       Past Surgical History:   Procedure Laterality Date    APPENDECTOMY  2005    COLONOSCOPY  2015    TORTUOUS COLON- REPEAT IN 5 YEARS--DR. HERNANDES    HERNIA REPAIR  01/2018    TUBAL LIGATION Bilateral 11/1991        reports that she has never smoked. She has never been exposed to tobacco smoke. She has never used smokeless tobacco. She reports that she does not currently use alcohol. She reports that she does not use drugs.    Review of Systems  Review of Systems   HENT:  Positive for congestion, postnasal drip and rhinorrhea.    Respiratory:  Positive for cough.    All other systems reviewed and are negative.                                 Objective    Vitals:    02/18/25 0851   BP: 138/89   Pulse: 88   Resp: 16   Temp: 35.7 °C (96.2 °F)   SpO2: 97%   Weight: 54.4 kg (120 lb)   Height: 1.549 m (5' 1\")     No LMP recorded. Patient is postmenopausal.    Physical Exam  Vitals and nursing note reviewed.   Constitutional:       Appearance: Normal appearance.   HENT:      Head: Normocephalic.      Right Ear: External ear normal.      Left Ear: External " ear normal.      Nose: Congestion and rhinorrhea present.      Mouth/Throat:      Mouth: Mucous membranes are dry.      Pharynx: Oropharynx is clear.   Eyes:      Extraocular Movements: Extraocular movements intact.      Pupils: Pupils are equal, round, and reactive to light.   Cardiovascular:      Rate and Rhythm: Normal rate and regular rhythm.      Pulses: Normal pulses.      Heart sounds: Normal heart sounds.   Pulmonary:      Effort: Pulmonary effort is normal.      Breath sounds: Normal breath sounds and air entry. No decreased breath sounds, wheezing or rhonchi.   Musculoskeletal:         General: Normal range of motion.      Cervical back: Normal range of motion and neck supple.   Lymphadenopathy:      Cervical: Cervical adenopathy present.   Skin:     General: Skin is warm and dry.   Neurological:      General: No focal deficit present.      Mental Status: She is alert and oriented to person, place, and time.   Psychiatric:         Mood and Affect: Mood normal.         Behavior: Behavior normal.         Procedures    Point of Care Test & Imaging Results from this visit  Results for orders placed or performed in visit on 02/18/25   POCT Covid-19 Rapid Antigen   Result Value Ref Range    Binax NOW Covid Serial Number n     BINAX NOW Covid Expiration n     POC SOM-COV-2 AG  Presumptive negative test for SARS-CoV-2 (no antigen detected)     Presumptive negative test for SARS-CoV-2 (no antigen detected)   POCT Influenza A/B manually resulted   Result Value Ref Range    POC Rapid Influenza A Positive (A) Negative    POC Rapid Influenza B Negative Negative      No results found.    Diagnostic study results (if any) were reviewed by SAMANTHA Hong.    Assessment/Plan   Allergies, medications, history, and pertinent labs/EKGs/Imaging reviewed by SAMANTHA Hong.     Medical Decision Making  Rapid covid- NEG  Rapid influenza A- POSITIVE    Pt managing secretions, airway intact. There or no  "signs of PTA/TA, trismus, retropharyngeal abscess or epiglotitis. No signs of osteomyelitis, orbital cellulitis or other complications of sinusitis at this point in time. CXR deferred at this time as lungs are CTA A/P and there is no signs of respiratory distress. SpO2 >94% on RA. There is no reported SOB, ATKINS, pleuritic pain, fever. Clinical suspicions of pneumonia or other cardiorespiratory catastrophe at this time are low. Pt is ambulating without difficulty showing no signs of hypoxia. Pt well-hydrated, non smoker, nontoxic and there no signs of clinical deterioration. Pt did state she is having CHEST PAIN that is constant, she just came from [place]". She says her cough is minimal so less likely from her cough, and describes her CHEST PAIN as constant so EKG obtained.   EKG- abnormal; unable to compare to a previous EKG  Pt is influenza A positive, but due to concerns for cardiac process pt requires higher level of care and advised to go to ER for monitoring as well as serial troponins..etc.  Refused EMS, risks were communicated and said she will drive herself  Collaborating MD contacted and agrees to POC  Report called to [facility] ER      Orders and Diagnoses  Diagnoses and all orders for this visit:  Acute cough  -     POCT Covid-19 Rapid Antigen  -     POCT Influenza A/B manually resulted  Nasal congestion  Influenza A  -     guaiFENesin (Mucinex) 1,200 mg tablet extended release 12hr; Take 1 tablet (1,200 mg) by mouth every 12 hours if needed (cough) for up to 7 days.  -     benzonatate (Tessalon) 200 mg capsule; Take 1 capsule (200 mg) by mouth 3 times a day as needed for cough for up to 7 days. Do not crush or chew.  Chest pain, unspecified type  -     ECG 12 lead (Clinic Performed)      Medical Admin Record      Patient disposition: Home    Electronically signed by SAMANTHA Hong  9:16 AM      "

## 2025-02-18 NOTE — PROGRESS NOTES
Pharmacy Medication History Review    Desiree Shepard is a 64 y.o. female admitted for Influenza A. Pharmacy reviewed the patient's ofqrn-xv-qtroqioqy medications and allergies for accuracy.    Medications ADDED:  Coricidin HBP  Milk thistle   Vitamin B12 1000mcg  turmeric  Medications CHANGED:  Benzonatate 200mg - not started  Guaifenesin 1200mg - not started   Pravastatin 20mg - patient DOES take this   Medications REMOVED:   None      The list below reflects the updated PTA list. Comments regarding how patient may be taking medications differently can be found in the Admit Orders Activity  Prior to Admission Medications   Prescriptions Last Dose Informant   MILK THISTLE ORAL 2/18/2025 self   Sig: Take 1 each by mouth once daily.   PARoxetine (Paxil) 40 mg tablet 2/18/2025 self   Sig: Take 1.5 tablets (60 mg) by mouth once daily.   TURMERIC ORAL 2/18/2025 self   Sig: Take 1 each by mouth once daily.   Trulicity 4.5 mg/0.5 mL pen injector 2/17/2025 self   Sig: INJECT 4.5MG UNDER THE SKIN ONCE WEEKLY   blood-glu meter,cont-transmit misc Unknown self   Sig: as directed   blood-glucose sensor (Dexcom G7 Sensor) device Unknown self   Sig: Change every 10 DAYS   chlorpheniramine/dextromethorp (CORICIDIN HBP COUGH AND COLD ORAL) 2/18/2025 self   Sig: Take 1 each by mouth once daily as needed.   cholecalciferol (Vitamin D-3) 25 MCG (1000 UT) capsule 2/18/2025 self   Sig: Take 1 capsule (25 mcg) by mouth once every 24 hours.   cyanocobalamin (Vitamin B-12) 1,000 mcg tablet 2/18/2025 self   Sig: Take 1 tablet (1,000 mcg) by mouth once daily.   empagliflozin (Jardiance) 25 mg 2/18/2025 self   Sig: Take 1 tablet (25 mg) by mouth once daily.   hydrOXYzine HCL (Atarax) 25 mg tablet 2/18/2025 self   Sig: TAKE TWO TABLETS BY MOUTH EVERY 8 HOURS FOR ANXIETY   multivitamin tablet 2/18/2025 self   Sig: Take 1 tablet by mouth once daily.   pravastatin (Pravachol) 20 mg tablet 2/18/2025 self   Sig: Take 1 tablet (20 mg) by mouth once  daily.   traZODone (Desyrel) 100 mg tablet 2/17/2025 self   Sig: TAKE TWO TABLETS BY MOUTH EVERY DAY      Facility-Administered Medications: None        The list below reflects the updated allergy list. Please review each documented allergy for additional clarification and justification.  Allergies  Reviewed by Sandra Yost CPhT on 2/18/2025        Severity Reactions Comments    Penicillin Not Specified Other Reaction: drying out from inside    Statins-hmg-coa Reductase Inhibitors Not Specified Myalgia     Sulfamethoxazole-trimethoprim Not Specified Other Reaction: suicidal            Pharmacy has been updated to [facility].    Sources used to complete the med history include dispense history, PTA medication list, patient interview. Patient is a good historian.    Below are additional concerns with the patient's PTA list.  Patient reports allergy to statins as marked previously on her allergy list. Patient reports currently taking pravastatin 20mg daily.     Sandra Yost CPhT-Adv  Please reach out via [employer] Secure Chat for questions

## 2025-02-18 NOTE — CONSULTS
Inpatient consult to Cardiology  Consult performed by: ASHIA Dunn-CNP  Consult ordered by: Cathryn Lopez PA-C  Reason for consult: Elevated troponins          Reason For Consult  Elevated troponins    History Of Present Illness  Desiree Shepard is a 64 y.o. female presented to the ED with chest pain. Patient was seen in the urgent care this morning and tested positive for Influenza A. She complained of chest pain at the urgent care and was advised to come to the ED. The patient describes the chest discomfort as a dull pain and a feeling of soreness that radiated across her chest. She states that this pain started about 2 days ago. The patient had recently traveled out of [place] to [facility].  Denies radiation of pain up her neck, or down her arms.  Patient has a past medical history of diabetes, hyperlipidemia and anxiety.  In the ED patient was given a 1 L bolus of sodium chloride, 324 mg of aspirin, heparin bolus, and is currently on a continuous heparin infusion.  Chest x-ray obtained was negative; nonspecific hyperinflation of the lungs. Labs showed a glucose 517, sodium 134, potassium 4.5, BUN 20, creatinine 1.02 troponin 382, 954, hemoglobin/hematocrit 14.0 41.9. Two EKGs were obtained in the ED; the first showed sinus rhythm, left axis deviation, and a prolonged Q-T complex. The second EKG showed sinus rhythm with a prolonged Q-T complex. Patient has no cardiac history or any cardiac testing.  Patient does not see a cardiologist.      Past Medical History  She has a past medical history of Acid reflux, Age-related nuclear cataract of both eyes (6/13/2024), Diabetes mellitus (Multi), Herpes, Hiatal hernia, High cholesterol, History of HPV infection, IBS (irritable bowel syndrome), Insomnia, Rosacea, and Unspecified disorder of refraction (6/13/2024).    She has no past medical history of Hypertension.    Surgical History  She has a past surgical history that includes Colonoscopy (2015);  Appendectomy (2005); Tubal ligation (Bilateral, 11/1991); and Hernia repair (01/2018).     Social History  She reports that she has never smoked. She has never been exposed to tobacco smoke. She has never used smokeless tobacco. She reports that she does not currently use alcohol. She reports that she does not use drugs.    Family History  Family History   Problem Relation Name Age of Onset    Ulcerative colitis Mother      Throat cancer Father      Aneurysm Father          under his belly button - inherited    Leukemia Father      Other (abdominal aortic aneurysm) Father      Diabetes Father      Hypertension Father      Lupus Sister      Other (factor v) Sister      Blood clot Sister      Cancer Sister      Heart attack Brother  40    Testicular cancer Brother      Deep vein thrombosis Brother      Heart disease Brother      Hypertension Brother      Lupus Daughter      Ulcerative colitis Daughter      Obesity Daughter      Cancer Daughter      No Known Problems Daughter      No Known Problems Daughter      Other (abdominal aortic aneurysm) Paternal Grandfather      Lupus Half-Sister          Allergies  Penicillin, Statins-hmg-coa reductase inhibitors, and Sulfamethoxazole-trimethoprim      Physical Exam  Vitals and nursing note reviewed.   Constitutional:       General: She is not in acute distress.     Appearance: Normal appearance.   HENT:      Head: Normocephalic and atraumatic.      Mouth/Throat:      Mouth: Mucous membranes are dry.      Pharynx: Oropharynx is clear.   Eyes:      Extraocular Movements: Extraocular movements intact.   Cardiovascular:      Rate and Rhythm: Normal rate and regular rhythm.      Heart sounds: Normal heart sounds. No murmur heard.     No friction rub. No gallop.      Comments: Sinus Rhythm on telemetry   Pulmonary:      Effort: Pulmonary effort is normal. No respiratory distress.      Breath sounds: Normal breath sounds. No stridor. No wheezing or rhonchi.   Chest:      Chest  "wall: No tenderness.   Abdominal:      General: Bowel sounds are normal.      Palpations: Abdomen is soft.   Musculoskeletal:         General: No swelling. Normal range of motion.      Cervical back: Normal range of motion.   Skin:     General: Skin is warm and dry.      Capillary Refill: Capillary refill takes less than 2 seconds.   Neurological:      Mental Status: She is alert and oriented to person, place, and time.   Psychiatric:         Mood and Affect: Mood normal.         Behavior: Behavior normal.         Thought Content: Thought content normal.         Judgment: Judgment normal.          Last Recorded Vitals  Blood pressure 120/64, pulse 85, temperature 37 °C (98.6 °F), temperature source Oral, resp. rate (!) 118, height 1.549 m (5' 1\"), weight 54.4 kg (120 lb), SpO2 100%.    Relevant Results  Results for orders placed or performed during the hospital encounter of 02/18/25 (from the past 24 hours)   ECG 12 lead   Result Value Ref Range    Ventricular Rate 91 BPM    Atrial Rate 91 BPM    AL Interval 134 ms    QRS Duration 108 ms    QT Interval 428 ms    QTC Calculation(Bazett) 526 ms    P Axis 59 degrees    R Axis -44 degrees    T Axis 72 degrees    QRS Count 15 beats    Q Onset 223 ms    P Onset 156 ms    P Offset 207 ms    T Offset 437 ms    QTC Fredericia 492 ms   CBC and Auto Differential   Result Value Ref Range    WBC 5.5 4.4 - 11.3 x10*3/uL    nRBC 0.0 0.0 - 0.0 /100 WBCs    RBC 5.31 (H) 4.00 - 5.20 x10*6/uL    Hemoglobin 14.9 12.0 - 16.0 g/dL    Hematocrit 45.5 36.0 - 46.0 %    MCV 86 80 - 100 fL    MCH 28.1 26.0 - 34.0 pg    MCHC 32.7 32.0 - 36.0 g/dL    RDW 12.5 11.5 - 14.5 %    Platelets 261 150 - 450 x10*3/uL    Neutrophils % 64.6 40.0 - 80.0 %    Immature Granulocytes %, Automated 0.4 0.0 - 0.9 %    Lymphocytes % 27.2 13.0 - 44.0 %    Monocytes % 7.1 2.0 - 10.0 %    Eosinophils % 0.2 0.0 - 6.0 %    Basophils % 0.5 0.0 - 2.0 %    Neutrophils Absolute 3.54 1.20 - 7.70 x10*3/uL    Immature " Granulocytes Absolute, Automated 0.02 0.00 - 0.70 x10*3/uL    Lymphocytes Absolute 1.49 1.20 - 4.80 x10*3/uL    Monocytes Absolute 0.39 0.10 - 1.00 x10*3/uL    Eosinophils Absolute 0.01 0.00 - 0.70 x10*3/uL    Basophils Absolute 0.03 0.00 - 0.10 x10*3/uL   Basic metabolic panel   Result Value Ref Range    Glucose 517 (HH) 74 - 99 mg/dL    Sodium 134 (L) 136 - 145 mmol/L    Potassium 4.5 3.5 - 5.3 mmol/L    Chloride 95 (L) 98 - 107 mmol/L    Bicarbonate 24 21 - 32 mmol/L    Anion Gap 20 10 - 20 mmol/L    Urea Nitrogen 20 6 - 23 mg/dL    Creatinine 1.02 0.50 - 1.05 mg/dL    eGFR 62 >60 mL/min/1.73m*2    Calcium 9.2 8.6 - 10.3 mg/dL   Magnesium   Result Value Ref Range    Magnesium 2.17 1.60 - 2.40 mg/dL   Troponin I, High Sensitivity, Initial   Result Value Ref Range    Troponin I, High Sensitivity 382 (HH) 0 - 13 ng/L   Beta Hydroxybutyrate   Result Value Ref Range    Beta-Hydroxybutyrate 3.06 (H) 0.02 - 0.27 mmol/L   Troponin, High Sensitivity, 1 Hour   Result Value Ref Range    Troponin I, High Sensitivity 954 (HH) 0 - 13 ng/L   BLOOD GAS VENOUS FULL PANEL   Result Value Ref Range    POCT pH, Venous 7.27 (L) 7.33 - 7.43 pH    POCT pCO2, Venous 52 (H) 41 - 51 mm Hg    POCT pO2, Venous 28 (L) 35 - 45 mm Hg    POCT SO2, Venous 49 45 - 75 %    POCT Oxy Hemoglobin, Venous 48.2 45.0 - 75.0 %    POCT Hematocrit Calculated, Venous 45.0 36.0 - 46.0 %    POCT Sodium, Venous 130 (L) 136 - 145 mmol/L    POCT Potassium, Venous 4.5 3.5 - 5.3 mmol/L    POCT Chloride, Venous 95 (L) 98 - 107 mmol/L    POCT Ionized Calicum, Venous 1.13 1.10 - 1.33 mmol/L    POCT Glucose, Venous 503 (HH) 74 - 99 mg/dL    POCT Lactate, Venous 2.8 (H) 0.4 - 2.0 mmol/L    POCT Base Excess, Venous -3.7 (L) -2.0 - 3.0 mmol/L    POCT HCO3 Calculated, Venous 23.9 22.0 - 26.0 mmol/L    POCT Hemoglobin, Venous 15.1 12.0 - 16.0 g/dL    POCT Anion Gap, Venous 16.0 10.0 - 25.0 mmol/L    Patient Temperature 37.0 degrees Celsius    FiO2 20 %   ECG 12 lead   Result  Value Ref Range    Ventricular Rate 85 BPM    Atrial Rate 85 BPM    KS Interval 140 ms    QRS Duration 98 ms    QT Interval 450 ms    QTC Calculation(Bazett) 535 ms    P Axis 71 degrees    R Axis -11 degrees    T Axis 74 degrees    QRS Count 14 beats    Q Onset 219 ms    P Onset 149 ms    P Offset 204 ms    T Offset 444 ms    QTC Fredericia 505 ms   Blood Gas Lactic Acid, Venous   Result Value Ref Range    POCT Lactate, Venous 1.8 0.4 - 2.0 mmol/L   POCT GLUCOSE   Result Value Ref Range    POCT Glucose 414 (H) 74 - 99 mg/dL         Assessment/Plan     Influenza A  Chest pain  Elevated Troponin's  Hyperlipidemia  Diabetes  Anxiety     2/18: As above. Patient was seen in the urgent care this morning and tested positive for Influenza A. She complained of chest pain at the urgent care and was advised to come to the ED. The patient describes the chest discomfort as a dull pain and a feeling of soreness that radiated across her chest. She states that this pain started about 2 days ago.  Most recent set of vitals include a heart rate 85 blood pressure 120/64 satting 100% on room air.  Upon assessment the patient was wearing 2 L nasal cannula.  Patient denies shortness of breath or any difficulty breathing.  Patient appears euvolemic with no signs of edema or swelling in any of her extremities.  On telemetry she is in sinus rhythm.  With no cardiac history noted, an echocardiogram will be completed sometime today.  With her severe increase in troponin's, from 382 to 954, patient will go for left heart cath procedure tomorrow.  NPO at midnight.  We will continue to follow with you.       I spent 60 minutes in the professional and overall care of this patient.

## 2025-02-18 NOTE — ED PROVIDER NOTES
"The patient was seen in conjunction with the advanced practice provider, and I performed a substantive portion of the encounter. The following is my supervisory note.    History:  Patient presents to ED with daughter for concern of chest pain.  Patient notes pain is primarily associated with tussive events, primarily anterior chest wall, with variable asymptomatic durations without tussive episodes.  Notes the pain is a dull ache, mild to moderate in severity, nonradiating to include her back.  Notes no significant cardiac history.  No experiencing associated diaphoresis or nausea but notes some shortness of breath.  Endorses being ill with flulike symptoms since returning from [place] with family.  Does not endorse any bilateral/unilateral leg swelling.  Notes no history of DVT/PE.  Notes minimally productive cough of clearish phlegm denies any hemoptysis.  Not experienced any associated lightheadedness/dizziness.  Denies any GI/ symptoms.  Denies any other significant systemic symptoms or complaints.    VS:  BP 87/59   Pulse 93   Temp 37.6 °C (99.7 °F) (Oral)   Resp 20   Ht 1.549 m (5' 1\")   Wt 54.4 kg (120 lb)   SpO2 98%   BMI 22.67 kg/m²      Physical exam:  CONST: alert, normal appearance, no acute distress, does not appear ill/toxic  NECK: no JVD  CV:  RRR, no murmurs, 2+ equal/symmetrical pulses x4, no BLE or unilateral leg swelling/edema  PULM: CTAB, no respiratory distress, not requiring supplemental O2  CHEST: non-tender, no crepitus/deformities, no paradoxical chest wall movement, no ecchymosis/erythema, no edema  ABD: soft, flat/non-tender/non-distended, no mass  SKIN: warm/dry, no pallor or jaundice, no rash  NEURO: A&Ox4, no focal neuro deficits      I personally reviewed and interpreted the following studies: EKG is NSR 91, LAD/LAFB, prolonged Qtc 526 ms, no appreciable ischemia with nonspecific TW findings, labs are significant for hyperglycemia with AGMA, significant troponinemia with " significant increased delta,, images are notable for CXR no evidence of cardiomegaly, no significant pulmonary vascular congestion or opacities .      MDM:  Patient presented to the ED for worsening chest pain for last 1 to 2 days primarily occurring with posttussive events.  Concerning PMHx of DM, hypothyroidism, HLD.    Per Chart Review:  Tyrell  visit on 2/18/2025, visit summary significant for patient concern of cough for last 4 days with associated chest pain, VSS, nasal congestion with rhinorrhea and dry MM, cervical adenopathy, influenza A positive, EKG demonstrating some abnormality, recommend ED for further evaluation.    Assessment/evaluation multifactorial consistent with NSTEMI with concern for early ACS/MI versus myocarditis in the setting of influenza A versus T2 MI secondary to dehydration and complicated by mild to moderate DKA. No concerning history, clinical evidence/work-up, or exam findings for the concerning differentials of acute HF/fluid overload, endocrine derangement, significant electrolyte derangement, pericarditis, low sufficient for PE. These conditions have been thoroughly evaluated and determined to be sufficiently unlikely to be the etiology of patient's presenting symptoms.     Scores: Heart 5 (12-16% MACE)    ED Course/Diagnosis:  ED Course as of 02/19/25 0916   Tue Feb 18, 2025   1000 I personally reviewed and interpreted the EKG @ 0952: NSR 91, no appreciable ischemia, LAD, LAFB, prolonged Qtc 526 ms, non-specific TW findings, and no prior EKG available for review [BC]   1147 Spoke with cardiology Dr. June due to elevated troponin. Will come evaluate patient in the emergency department to determine if need heparinization. [AF]   1215 Spoke with cardiology once again given up trending troponin, recommends heparinization at this time [AF]      ED Course User Index  [AF] Cathryn Lopez PA-C  [BC] Lobo Esquivel MD         Diagnoses as of 02/19/25 0916   Influenza A    NSTEMI (non-ST elevated myocardial infarction) (Multi)       1. Influenza A  Case Request Cath Lab: Left Heart Cath    Case Request Cath Lab: Left Heart Cath    Cardiac Catheterization Procedure    Cardiac Catheterization Procedure      2. NSTEMI (non-ST elevated myocardial infarction) (Multi)  Case Request Cath Lab: Left Heart Cath    Case Request Cath Lab: Left Heart Cath    Cardiac Catheterization Procedure    Cardiac Catheterization Procedure      3. Elevated troponin  Transthoracic Echo (TTE) Complete    Transthoracic Echo (TTE) Complete               Lobo Esquivel MD  02/19/25 0970

## 2025-02-18 NOTE — ED PROVIDER NOTES
HPI   Chief Complaint   Patient presents with    Chest Pain     Patient is currently positive for Flu A and is now experiencing chest pain that started about 2 days ago.  States it as a haed pain that spreads across her chest. Sent from urgent care for further eval       Patient is a 64-year-old female presents the emergency department for evaluation of chest pain.  Patient states that she was just recently in [place] on vacation and came down with flulike symptoms.  She states she has had cough congestion and bodyaches over the last week.  She states over the last 2 days she has been having intermittent pain across the entire chest.  She initially went to urgent care who did viral testing and patient was positive for influenza A, but given her chest pain sent her here for further evaluation.  She states that nothing specifically makes the pain better or worse.  She states it waxes and wanes in severity, but has been coming and going over the last 2 days.  She states he feels like a pressure across her entire chest with some dull aching pain.  Intermittently she feels short of breath.  She denies any history of any heart problems.  She notes history of diabetes and hyperlipidemia.  She has had intermittent fevers, chills with no nausea vomiting diarrhea.      History provided by:  Patient   used: No            Patient History   Past Medical History:   Diagnosis Date    Acid reflux     Age-related nuclear cataract of both eyes 6/13/2024    Diabetes mellitus (Multi)     Herpes     Hiatal hernia     High cholesterol     History of HPV infection     IBS (irritable bowel syndrome)     Insomnia     Rosacea     Unspecified disorder of refraction 6/13/2024     Past Surgical History:   Procedure Laterality Date    APPENDECTOMY  2005    COLONOSCOPY  2015    TORTUOUS COLON- REPEAT IN 5 YEARS--DR. HERNANDES    HERNIA REPAIR  01/2018    TUBAL LIGATION Bilateral 11/1991     Family History   Problem Relation Name  Age of Onset    Ulcerative colitis Mother      Throat cancer Father      Aneurysm Father          under his belly button - inherited    Leukemia Father      Other (abdominal aortic aneurysm) Father      Diabetes Father      Hypertension Father      Lupus Sister      Other (factor v) Sister      Blood clot Sister      Cancer Sister      Heart attack Brother  40    Testicular cancer Brother      Deep vein thrombosis Brother      Heart disease Brother      Hypertension Brother      Lupus Daughter      Ulcerative colitis Daughter      Obesity Daughter      Cancer Daughter      No Known Problems Daughter      No Known Problems Daughter      Other (abdominal aortic aneurysm) Paternal Grandfather      Lupus Half-Sister       Social History     Tobacco Use    Smoking status: Never     Passive exposure: Never    Smokeless tobacco: Never   Vaping Use    Vaping status: Never Used   Substance Use Topics    Alcohol use: Not Currently    Drug use: Never       Physical Exam   ED Triage Vitals [02/18/25 0945]   Temperature Heart Rate Respirations BP   36.6 °C (97.9 °F) 93 16 109/64      Pulse Ox Temp src Heart Rate Source Patient Position   100 % -- -- --      BP Location FiO2 (%)     -- --       Physical Exam  Constitutional:       Appearance: She is well-developed.   Cardiovascular:      Rate and Rhythm: Normal rate and regular rhythm.   Pulmonary:      Effort: Pulmonary effort is normal.      Breath sounds: Normal breath sounds. No decreased breath sounds, wheezing, rhonchi or rales.   Abdominal:      General: Bowel sounds are normal.      Palpations: Abdomen is soft.      Tenderness: There is no abdominal tenderness.   Musculoskeletal:         General: Normal range of motion.   Skin:     General: Skin is warm and dry.   Neurological:      Mental Status: She is alert and oriented to person, place, and time.           ED Course & MDM   ED Course as of 02/18/25 1521   Tue Feb 18, 2025   1000 I personally reviewed and interpreted  the EKG @ 0952: NSR 91, no appreciable ischemia, LAD, LAFB, prolonged Qtc 526 ms, non-specific TW findings, and no prior EKG available for review [BC]   1147 Spoke with cardiology Dr. June due to elevated troponin. Will come evaluate patient in the emergency department to determine if need heparinization. [AF]   1215 Spoke with cardiology once again given up trending troponin, recommends heparinization at this time [AF]      ED Course User Index  [AF] Cathryn Lopez PA-C  [BC] Lobo Esquivel MD         Diagnoses as of 02/18/25 1521   Influenza A   NSTEMI (non-ST elevated myocardial infarction) (Multi)                 No data recorded     Marianela Coma Scale Score: 15 (02/18/25 1009 : Fabiola Fan RN)                           Medical Decision Making  Patient is a 64-year-old female who presents to the emergency department for evaluation of chest pain over the last 2 days and flulike symptoms over the last week.    EKG was interpreted by attending physician and reviewed by me.    Lab work done today included BMP, CBC, troponins, magnesium.  Lab work with initial troponin of 382 with repeat troponin of 954 with hyperglycemia otherwise unremarkable.    Scans done today were interpreted/confirmed by radiologist and also interpreted by me which included chest x-ray.  Chest x-ray shows nonspecific hyperinflation of the lungs otherwise negative.    Medications given at today's visit include p.o. Tylenol    I saw this patient in conjunction with Dr. Esquivel.  Patient in urgent care patient did test positive for influenza A.  Patient remained stable in the emergency department.  She does persist to have some subtle pain in the center of the chest.  EKG shows shows no evidence of ischemia, but initial troponin is significantly elevated at 382.  She is hyperglycemic, but does not have evidence of DKA at this time.  She has given IV fluids and dose of subcutaneous insulin for treatment of hyperglycemia which improved.   As for her elevated troponin with an increase to 954 I did speak with cardiology on-call Dr. June to discuss case.  With initial elevation in the setting of patient being influenza A positive, he would like to evaluate patient prior to starting heparinization.  Once repeat troponin came back at 954, he recommends given significant jump would recommend heparinization and treatment for NSTEMI at this time.  He will see patient in the emergency department for further care moving forward and consult order placed.  Repeat EKG does not show any evidence of ischemia or STEMI at this time.  Patient otherwise remained stable and started on IV heparin.  I spoke with hospitalist on-call was agreeable to mission of patient for further management.    The patient/family was counseled on clinical impression, expectations, and plan along with recommendations to admission.  All questions were answered and involved parties were understanding and agreeable to course of treatment.  Case was discussed with admitting physician and any consultants. Bed type, ED treatment and further ED workup decided by joint decision making with admitting team and any consultants. Patient stable for admission per my assessment and further management of patient will be deferred to the inpatient setting.    ** Disclaimer:  Parts of this document were written utilizing a voice to text dictation software.  Note may contain minor transcription or typographical errors that were inadvertently transcribed by the computer software.        Procedure  Critical Care    Performed by: Cathryn Lopez PA-C  Authorized by: Lobo Esquivel MD    Critical care provider statement:     Critical care time (minutes):  31    Critical care time was exclusive of:  Separately billable procedures and treating other patients    Critical care was necessary to treat or prevent imminent or life-threatening deterioration of the following conditions:  Cardiac failure    Critical  care was time spent personally by me on the following activities:  Blood draw for specimens, development of treatment plan with patient or surrogate, discussions with consultants, evaluation of patient's response to treatment, examination of patient, ordering and performing treatments and interventions, ordering and review of laboratory studies, ordering and review of radiographic studies, pulse oximetry, review of old charts, obtaining history from patient or surrogate and interpretation of cardiac output measurements    Care discussed with: admitting provider         Cathryn Lopez PA-C  02/18/25 4641

## 2025-02-18 NOTE — H&P
History Of Present Illness  Desiree Shepard is a 64 y.o. female presenting with congestion and chest tightness. Onset of symptoms was about 4-5 days ago. She noted that her symptoms has persistently gotten worse since she returned from vacation. She was having chest pressure which she attributed to her walking around and carrying back pack for a very long time.  She went to urgent care and was diagnosed with flu. However, she was advised to go to the Er because she complained of chest pain.  In the Er, she was found to have elevated trop and trop continued to increase significantly.  She was found to have NSTEMI and placed on heparin drip.      Past Medical History  She has a past medical history of Acid reflux, Age-related nuclear cataract of both eyes (6/13/2024), Diabetes mellitus (Multi), Herpes, Hiatal hernia, High cholesterol, History of HPV infection, IBS (irritable bowel syndrome), Insomnia, Rosacea, and Unspecified disorder of refraction (6/13/2024).    She has no past medical history of Hypertension.    Surgical History  She has a past surgical history that includes Colonoscopy (2015); Appendectomy (2005); Tubal ligation (Bilateral, 11/1991); and Hernia repair (01/2018).     Social History  She reports that she has never smoked. She has never been exposed to tobacco smoke. She has never used smokeless tobacco. She reports that she does not currently use alcohol. She reports that she does not use drugs.    Family History  Family History   Problem Relation Name Age of Onset    Ulcerative colitis Mother      Throat cancer Father      Aneurysm Father          under his belly button - inherited    Leukemia Father      Other (abdominal aortic aneurysm) Father      Diabetes Father      Hypertension Father      Lupus Sister      Other (factor v) Sister      Blood clot Sister      Cancer Sister      Heart attack Brother  40    Testicular cancer Brother      Deep vein thrombosis Brother      Heart disease Brother       Hypertension Brother      Lupus Daughter      Ulcerative colitis Daughter      Obesity Daughter      Cancer Daughter      No Known Problems Daughter      No Known Problems Daughter      Other (abdominal aortic aneurysm) Paternal Grandfather      Lupus Half-Sister          Allergies  Penicillin, Statins-hmg-coa reductase inhibitors, and Sulfamethoxazole-trimethoprim    Review of Systems   Constitutional:  Positive for chills and fatigue.   HENT:  Positive for congestion and sinus pressure.    Eyes:  Negative for discharge.   Respiratory:  Positive for cough, chest tightness and shortness of breath.    Cardiovascular:  Positive for chest pain.   Gastrointestinal:  Negative for abdominal pain, diarrhea and nausea.   Genitourinary:  Negative for difficulty urinating and dysuria.   Musculoskeletal:  Positive for myalgias.   Skin:  Negative for color change.   Neurological:  Negative for dizziness, syncope and headaches.   Psychiatric/Behavioral:  Negative for agitation and dysphoric mood.         Physical Exam  Constitutional:       Appearance: Normal appearance.   HENT:      Head: Normocephalic and atraumatic.      Nose: Nose normal.      Mouth/Throat:      Mouth: Mucous membranes are moist.   Eyes:      Extraocular Movements: Extraocular movements intact.      Pupils: Pupils are equal, round, and reactive to light.   Cardiovascular:      Rate and Rhythm: Normal rate and regular rhythm.      Pulses: Normal pulses.      Heart sounds: Normal heart sounds.   Pulmonary:      Effort: Pulmonary effort is normal.      Breath sounds: Normal breath sounds.   Abdominal:      General: Bowel sounds are normal.   Musculoskeletal:      Cervical back: Neck supple.   Neurological:      Mental Status: She is alert.          Last Recorded Vitals  BP 93/65 (BP Location: Right arm, Patient Position: Lying)   Pulse 91   Temp 37 °C (98.6 °F) (Oral)   Resp 20   Wt 54.4 kg (120 lb)   SpO2 96%     Relevant Results         Assessment/Plan    Assessment & Plan  Influenza A  Symptom management.  Hold off on Tamiflu for now    NSTEMI (non-ST elevated myocardial infarction) (Multi)  Started on heparin drip  Cardiology following  Tentative Cardiac cath in am  Loaded with 324 mg of asa  Asa daily  Lipid panel in am  TSH and A1C    Dm type 2:  fairly controlled  Patient on Jardiance and Trulicity  Hold both meds  BGL is poorly controlled on admission.  IV fluid was given  Insulin sliding scale ordered and patient placed on DM diet    Depression/Anxiety:  resume home medications        Venkata Boyd MD

## 2025-02-19 ENCOUNTER — APPOINTMENT (OUTPATIENT)
Dept: RADIOLOGY | Facility: HOSPITAL | Age: 65
End: 2025-02-19
Payer: COMMERCIAL

## 2025-02-19 PROBLEM — J96.01 ACUTE RESPIRATORY FAILURE WITH HYPOXIA (MULTI): Status: ACTIVE | Noted: 2025-02-19

## 2025-02-19 LAB
ALBUMIN SERPL BCP-MCNC: 3.1 G/DL (ref 3.4–5)
ALBUMIN SERPL BCP-MCNC: 3.3 G/DL (ref 3.4–5)
ALBUMIN SERPL BCP-MCNC: 3.7 G/DL (ref 3.4–5)
ALP SERPL-CCNC: 87 U/L (ref 33–136)
ALT SERPL W P-5'-P-CCNC: 22 U/L (ref 7–45)
ANION GAP BLDV CALCULATED.4IONS-SCNC: 29 MMOL/L (ref 10–25)
ANION GAP SERPL CALCULATED.3IONS-SCNC: 14 MMOL/L (ref 10–20)
ANION GAP SERPL CALCULATED.3IONS-SCNC: 18 MMOL/L (ref 10–20)
ANION GAP SERPL CALCULATED.3IONS-SCNC: 20 MMOL/L (ref 10–20)
ANION GAP SERPL CALCULATED.3IONS-SCNC: 29 MMOL/L (ref 10–20)
ANION GAP SERPL CALCULATED.3IONS-SCNC: 31 MMOL/L (ref 10–20)
APTT PPP: 32.5 SECONDS (ref 22–32.5)
APTT PPP: 75.2 SECONDS (ref 22–32.5)
APTT PPP: 76.1 SECONDS (ref 22–32.5)
AST SERPL W P-5'-P-CCNC: 34 U/L (ref 9–39)
B-OH-BUTYR SERPL-SCNC: 11.14 MMOL/L (ref 0.02–0.27)
BASE EXCESS BLDV CALC-SCNC: -18.4 MMOL/L (ref -2–3)
BILIRUB SERPL-MCNC: 0.4 MG/DL (ref 0–1.2)
BNP SERPL-MCNC: 90 PG/ML (ref 0–99)
BODY TEMPERATURE: 37 DEGREES CELSIUS
BUN SERPL-MCNC: 13 MG/DL (ref 6–23)
BUN SERPL-MCNC: 14 MG/DL (ref 6–23)
BUN SERPL-MCNC: 15 MG/DL (ref 6–23)
CA-I BLDV-SCNC: 1.18 MMOL/L (ref 1.1–1.33)
CALCIUM SERPL-MCNC: 7.8 MG/DL (ref 8.6–10.3)
CALCIUM SERPL-MCNC: 7.9 MG/DL (ref 8.6–10.3)
CALCIUM SERPL-MCNC: 8.1 MG/DL (ref 8.6–10.3)
CALCIUM SERPL-MCNC: 8.5 MG/DL (ref 8.6–10.3)
CALCIUM SERPL-MCNC: 8.5 MG/DL (ref 8.6–10.3)
CHLORIDE BLDV-SCNC: 101 MMOL/L (ref 98–107)
CHLORIDE SERPL-SCNC: 101 MMOL/L (ref 98–107)
CHLORIDE SERPL-SCNC: 106 MMOL/L (ref 98–107)
CHLORIDE SERPL-SCNC: 106 MMOL/L (ref 98–107)
CHLORIDE SERPL-SCNC: 107 MMOL/L (ref 98–107)
CHLORIDE SERPL-SCNC: 99 MMOL/L (ref 98–107)
CHOLEST SERPL-MCNC: 179 MG/DL (ref 0–199)
CHOLEST/HDLC SERPL: 4.1 {RATIO}
CO2 SERPL-SCNC: 11 MMOL/L (ref 21–32)
CO2 SERPL-SCNC: 16 MMOL/L (ref 21–32)
CO2 SERPL-SCNC: 17 MMOL/L (ref 21–32)
CO2 SERPL-SCNC: 23 MMOL/L (ref 21–32)
CO2 SERPL-SCNC: 8 MMOL/L (ref 21–32)
CREAT SERPL-MCNC: 0.83 MG/DL (ref 0.5–1.05)
CREAT SERPL-MCNC: 0.85 MG/DL (ref 0.5–1.05)
CREAT SERPL-MCNC: 0.87 MG/DL (ref 0.5–1.05)
CREAT SERPL-MCNC: 0.94 MG/DL (ref 0.5–1.05)
CREAT SERPL-MCNC: 1.01 MG/DL (ref 0.5–1.05)
EGFRCR SERPLBLD CKD-EPI 2021: 63 ML/MIN/1.73M*2
EGFRCR SERPLBLD CKD-EPI 2021: 68 ML/MIN/1.73M*2
EGFRCR SERPLBLD CKD-EPI 2021: 75 ML/MIN/1.73M*2
EGFRCR SERPLBLD CKD-EPI 2021: 77 ML/MIN/1.73M*2
EGFRCR SERPLBLD CKD-EPI 2021: 79 ML/MIN/1.73M*2
EST. AVERAGE GLUCOSE BLD GHB EST-MCNC: 275 MG/DL
GLUCOSE BLD MANUAL STRIP-MCNC: 102 MG/DL (ref 74–99)
GLUCOSE BLD MANUAL STRIP-MCNC: 107 MG/DL (ref 74–99)
GLUCOSE BLD MANUAL STRIP-MCNC: 115 MG/DL (ref 74–99)
GLUCOSE BLD MANUAL STRIP-MCNC: 117 MG/DL (ref 74–99)
GLUCOSE BLD MANUAL STRIP-MCNC: 118 MG/DL (ref 74–99)
GLUCOSE BLD MANUAL STRIP-MCNC: 121 MG/DL (ref 74–99)
GLUCOSE BLD MANUAL STRIP-MCNC: 123 MG/DL (ref 74–99)
GLUCOSE BLD MANUAL STRIP-MCNC: 177 MG/DL (ref 74–99)
GLUCOSE BLD MANUAL STRIP-MCNC: 187 MG/DL (ref 74–99)
GLUCOSE BLD MANUAL STRIP-MCNC: 193 MG/DL (ref 74–99)
GLUCOSE BLD MANUAL STRIP-MCNC: 330 MG/DL (ref 74–99)
GLUCOSE BLD MANUAL STRIP-MCNC: 88 MG/DL (ref 74–99)
GLUCOSE BLDV-MCNC: 291 MG/DL (ref 74–99)
GLUCOSE SERPL-MCNC: 111 MG/DL (ref 74–99)
GLUCOSE SERPL-MCNC: 125 MG/DL (ref 74–99)
GLUCOSE SERPL-MCNC: 190 MG/DL (ref 74–99)
GLUCOSE SERPL-MCNC: 302 MG/DL (ref 74–99)
GLUCOSE SERPL-MCNC: 347 MG/DL (ref 74–99)
HBA1C MFR BLD: 11.2 %
HCO3 BLDV-SCNC: 7.6 MMOL/L (ref 22–26)
HCT VFR BLD EST: 45 % (ref 36–46)
HDLC SERPL-MCNC: 43.6 MG/DL
HGB BLDV-MCNC: 15 G/DL (ref 12–16)
INHALED O2 CONCENTRATION: 21 %
LACTATE BLDV-SCNC: 1.4 MMOL/L (ref 0.4–2)
LACTATE BLDV-SCNC: 1.6 MMOL/L (ref 0.4–2)
LDLC SERPL CALC-MCNC: ABNORMAL MG/DL
MAGNESIUM SERPL-MCNC: 1.88 MG/DL (ref 1.6–2.4)
MAGNESIUM SERPL-MCNC: 1.91 MG/DL (ref 1.6–2.4)
MAGNESIUM SERPL-MCNC: 2.04 MG/DL (ref 1.6–2.4)
NON HDL CHOLESTEROL: 135 MG/DL (ref 0–149)
OXYHGB MFR BLDV: 90.9 % (ref 45–75)
PCO2 BLDV: 20 MM HG (ref 41–51)
PH BLDV: 7.19 PH (ref 7.33–7.43)
PHOSPHATE SERPL-MCNC: 2.1 MG/DL (ref 2.5–4.9)
PHOSPHATE SERPL-MCNC: 2.2 MG/DL (ref 2.5–4.9)
PO2 BLDV: 63 MM HG (ref 35–45)
POTASSIUM BLDV-SCNC: 5.4 MMOL/L (ref 3.5–5.3)
POTASSIUM SERPL-SCNC: 3.3 MMOL/L (ref 3.5–5.3)
POTASSIUM SERPL-SCNC: 3.7 MMOL/L (ref 3.5–5.3)
POTASSIUM SERPL-SCNC: 4 MMOL/L (ref 3.5–5.3)
POTASSIUM SERPL-SCNC: 4 MMOL/L (ref 3.5–5.3)
POTASSIUM SERPL-SCNC: 4.3 MMOL/L (ref 3.5–5.3)
PROT SERPL-MCNC: 6.3 G/DL (ref 6.4–8.2)
SAO2 % BLDV: 91 % (ref 45–75)
SODIUM BLDV-SCNC: 132 MMOL/L (ref 136–145)
SODIUM SERPL-SCNC: 135 MMOL/L (ref 136–145)
SODIUM SERPL-SCNC: 136 MMOL/L (ref 136–145)
SODIUM SERPL-SCNC: 138 MMOL/L (ref 136–145)
SODIUM SERPL-SCNC: 139 MMOL/L (ref 136–145)
SODIUM SERPL-SCNC: 139 MMOL/L (ref 136–145)
TRIGL SERPL-MCNC: 417 MG/DL (ref 0–149)
VLDL: ABNORMAL

## 2025-02-19 PROCEDURE — 85730 THROMBOPLASTIN TIME PARTIAL: CPT

## 2025-02-19 PROCEDURE — 71045 X-RAY EXAM CHEST 1 VIEW: CPT | Mod: FOREIGN READ | Performed by: RADIOLOGY

## 2025-02-19 PROCEDURE — 82947 ASSAY GLUCOSE BLOOD QUANT: CPT

## 2025-02-19 PROCEDURE — 2500000001 HC RX 250 WO HCPCS SELF ADMINISTERED DRUGS (ALT 637 FOR MEDICARE OP): Performed by: INTERNAL MEDICINE

## 2025-02-19 PROCEDURE — 82330 ASSAY OF CALCIUM: CPT | Performed by: HOSPITALIST

## 2025-02-19 PROCEDURE — 83735 ASSAY OF MAGNESIUM: CPT

## 2025-02-19 PROCEDURE — 85730 THROMBOPLASTIN TIME PARTIAL: CPT | Performed by: SURGERY

## 2025-02-19 PROCEDURE — 99291 CRITICAL CARE FIRST HOUR: CPT | Performed by: HOSPITALIST

## 2025-02-19 PROCEDURE — 36415 COLL VENOUS BLD VENIPUNCTURE: CPT | Performed by: INTERNAL MEDICINE

## 2025-02-19 PROCEDURE — 2500000002 HC RX 250 W HCPCS SELF ADMINISTERED DRUGS (ALT 637 FOR MEDICARE OP, ALT 636 FOR OP/ED)

## 2025-02-19 PROCEDURE — 99291 CRITICAL CARE FIRST HOUR: CPT

## 2025-02-19 PROCEDURE — 2500000002 HC RX 250 W HCPCS SELF ADMINISTERED DRUGS (ALT 637 FOR MEDICARE OP, ALT 636 FOR OP/ED): Performed by: INTERNAL MEDICINE

## 2025-02-19 PROCEDURE — 84132 ASSAY OF SERUM POTASSIUM: CPT

## 2025-02-19 PROCEDURE — 82010 KETONE BODYS QUAN: CPT | Performed by: HOSPITALIST

## 2025-02-19 PROCEDURE — 99232 SBSQ HOSP IP/OBS MODERATE 35: CPT | Performed by: INTERNAL MEDICINE

## 2025-02-19 PROCEDURE — 2500000004 HC RX 250 GENERAL PHARMACY W/ HCPCS (ALT 636 FOR OP/ED): Performed by: HOSPITALIST

## 2025-02-19 PROCEDURE — 71045 X-RAY EXAM CHEST 1 VIEW: CPT

## 2025-02-19 PROCEDURE — 80053 COMPREHEN METABOLIC PANEL: CPT | Performed by: INTERNAL MEDICINE

## 2025-02-19 PROCEDURE — 80048 BASIC METABOLIC PNL TOTAL CA: CPT | Mod: CCI | Performed by: HOSPITALIST

## 2025-02-19 PROCEDURE — 2020000001 HC ICU ROOM DAILY

## 2025-02-19 PROCEDURE — 2500000004 HC RX 250 GENERAL PHARMACY W/ HCPCS (ALT 636 FOR OP/ED)

## 2025-02-19 PROCEDURE — 83605 ASSAY OF LACTIC ACID: CPT | Performed by: HOSPITALIST

## 2025-02-19 PROCEDURE — 80061 LIPID PANEL: CPT | Performed by: INTERNAL MEDICINE

## 2025-02-19 PROCEDURE — 2500000001 HC RX 250 WO HCPCS SELF ADMINISTERED DRUGS (ALT 637 FOR MEDICARE OP)

## 2025-02-19 PROCEDURE — 80048 BASIC METABOLIC PNL TOTAL CA: CPT | Mod: CCI

## 2025-02-19 PROCEDURE — 93010 ELECTROCARDIOGRAM REPORT: CPT | Performed by: INTERNAL MEDICINE

## 2025-02-19 PROCEDURE — 84132 ASSAY OF SERUM POTASSIUM: CPT | Performed by: HOSPITALIST

## 2025-02-19 PROCEDURE — 80069 RENAL FUNCTION PANEL: CPT | Mod: CCI | Performed by: HOSPITALIST

## 2025-02-19 PROCEDURE — 36415 COLL VENOUS BLD VENIPUNCTURE: CPT

## 2025-02-19 RX ORDER — ACETAMINOPHEN 325 MG/1
650 TABLET ORAL EVERY 6 HOURS PRN
Status: DISCONTINUED | OUTPATIENT
Start: 2025-02-19 | End: 2025-02-27 | Stop reason: HOSPADM

## 2025-02-19 RX ORDER — SODIUM CHLORIDE 450 MG/100ML
250 INJECTION, SOLUTION INTRAVENOUS CONTINUOUS
Status: DISCONTINUED | OUTPATIENT
Start: 2025-02-19 | End: 2025-02-19

## 2025-02-19 RX ORDER — DEXTROSE MONOHYDRATE 100 MG/ML
150 INJECTION, SOLUTION INTRAVENOUS CONTINUOUS PRN
Status: DISCONTINUED | OUTPATIENT
Start: 2025-02-19 | End: 2025-02-19

## 2025-02-19 RX ORDER — MAGNESIUM SULFATE HEPTAHYDRATE 40 MG/ML
2 INJECTION, SOLUTION INTRAVENOUS ONCE
Status: COMPLETED | OUTPATIENT
Start: 2025-02-19 | End: 2025-02-20

## 2025-02-19 RX ORDER — POTASSIUM CHLORIDE 20 MEQ/1
40 TABLET, EXTENDED RELEASE ORAL
Status: COMPLETED | OUTPATIENT
Start: 2025-02-19 | End: 2025-02-19

## 2025-02-19 RX ORDER — DEXTROSE MONOHYDRATE 100 MG/ML
150 INJECTION, SOLUTION INTRAVENOUS CONTINUOUS PRN
Status: DISCONTINUED | OUTPATIENT
Start: 2025-02-19 | End: 2025-02-20

## 2025-02-19 RX ORDER — MORPHINE SULFATE 2 MG/ML
2 INJECTION, SOLUTION INTRAMUSCULAR; INTRAVENOUS EVERY 4 HOURS PRN
Status: DISCONTINUED | OUTPATIENT
Start: 2025-02-19 | End: 2025-02-27 | Stop reason: HOSPADM

## 2025-02-19 RX ORDER — DEXTROSE MONOHYDRATE AND SODIUM CHLORIDE 5; .45 G/100ML; G/100ML
150 INJECTION, SOLUTION INTRAVENOUS CONTINUOUS PRN
Status: DISCONTINUED | OUTPATIENT
Start: 2025-02-19 | End: 2025-02-19

## 2025-02-19 RX ORDER — OSELTAMIVIR PHOSPHATE 6 MG/ML
30 FOR SUSPENSION ORAL DAILY
Status: DISCONTINUED | OUTPATIENT
Start: 2025-02-19 | End: 2025-02-19

## 2025-02-19 RX ORDER — HEPARIN SODIUM 5000 [USP'U]/ML
INJECTION, SOLUTION INTRAVENOUS; SUBCUTANEOUS AS NEEDED
Status: DISCONTINUED | OUTPATIENT
Start: 2025-02-19 | End: 2025-02-20

## 2025-02-19 RX ORDER — DEXTROSE 50 % IN WATER (D50W) INTRAVENOUS SYRINGE
50
Status: DISCONTINUED | OUTPATIENT
Start: 2025-02-19 | End: 2025-02-19 | Stop reason: SDUPTHER

## 2025-02-19 RX ORDER — DEXTROSE 50 % IN WATER (D50W) INTRAVENOUS SYRINGE
50
Status: DISCONTINUED | OUTPATIENT
Start: 2025-02-19 | End: 2025-02-19

## 2025-02-19 RX ORDER — POLYETHYLENE GLYCOL 3350 17 G/17G
17 POWDER, FOR SOLUTION ORAL DAILY PRN
Status: DISCONTINUED | OUTPATIENT
Start: 2025-02-19 | End: 2025-02-27 | Stop reason: HOSPADM

## 2025-02-19 RX ORDER — DEXTROSE 50 % IN WATER (D50W) INTRAVENOUS SYRINGE
50
Status: DISCONTINUED | OUTPATIENT
Start: 2025-02-19 | End: 2025-02-20 | Stop reason: SDUPTHER

## 2025-02-19 RX ORDER — HEPARIN SODIUM 10000 [USP'U]/100ML
0-4000 INJECTION, SOLUTION INTRAVENOUS CONTINUOUS
Status: DISCONTINUED | OUTPATIENT
Start: 2025-02-19 | End: 2025-02-20

## 2025-02-19 RX ORDER — PROCHLORPERAZINE EDISYLATE 5 MG/ML
5 INJECTION INTRAMUSCULAR; INTRAVENOUS EVERY 6 HOURS PRN
Status: DISCONTINUED | OUTPATIENT
Start: 2025-02-19 | End: 2025-02-27 | Stop reason: HOSPADM

## 2025-02-19 RX ORDER — HYDROXYZINE HYDROCHLORIDE 25 MG/1
25 TABLET, FILM COATED ORAL EVERY 8 HOURS PRN
Status: DISCONTINUED | OUTPATIENT
Start: 2025-02-19 | End: 2025-02-27 | Stop reason: HOSPADM

## 2025-02-19 RX ADMIN — SODIUM CHLORIDE, POTASSIUM CHLORIDE, SODIUM LACTATE AND CALCIUM CHLORIDE 1000 ML: 600; 310; 30; 20 INJECTION, SOLUTION INTRAVENOUS at 11:20

## 2025-02-19 RX ADMIN — MAGNESIUM SULFATE IN WATER 2 G: 40 INJECTION, SOLUTION INTRAVENOUS at 22:45

## 2025-02-19 RX ADMIN — TRAZODONE HYDROCHLORIDE 200 MG: 100 TABLET ORAL at 01:36

## 2025-02-19 RX ADMIN — TICAGRELOR 90 MG: 90 TABLET ORAL at 06:26

## 2025-02-19 RX ADMIN — POTASSIUM CHLORIDE 40 MEQ: 1500 TABLET, EXTENDED RELEASE ORAL at 20:39

## 2025-02-19 RX ADMIN — SODIUM CHLORIDE, SODIUM LACTATE, POTASSIUM CHLORIDE, AND CALCIUM CHLORIDE 500 ML: 600; 310; 30; 20 INJECTION, SOLUTION INTRAVENOUS at 22:44

## 2025-02-19 RX ADMIN — INSULIN HUMAN 7 UNITS/HR: 1 INJECTION, SOLUTION INTRAVENOUS at 14:39

## 2025-02-19 RX ADMIN — POTASSIUM CHLORIDE 40 MEQ: 1500 TABLET, EXTENDED RELEASE ORAL at 17:17

## 2025-02-19 RX ADMIN — HEPARIN SODIUM 700 UNITS/HR: 10000 INJECTION, SOLUTION INTRAVENOUS at 20:43

## 2025-02-19 RX ADMIN — ASPIRIN 81 MG CHEWABLE TABLET 81 MG: 81 TABLET CHEWABLE at 08:51

## 2025-02-19 RX ADMIN — TRAZODONE HYDROCHLORIDE 200 MG: 100 TABLET ORAL at 20:39

## 2025-02-19 RX ADMIN — PANTOPRAZOLE SODIUM 40 MG: 40 TABLET, DELAYED RELEASE ORAL at 06:26

## 2025-02-19 RX ADMIN — INSULIN LISPRO 8 UNITS: 100 INJECTION, SOLUTION INTRAVENOUS; SUBCUTANEOUS at 08:52

## 2025-02-19 RX ADMIN — DEXTROSE MONOHYDRATE 75 ML/HR: 5 INJECTION, SOLUTION INTRAVENOUS at 14:35

## 2025-02-19 SDOH — SOCIAL STABILITY: SOCIAL INSECURITY: ABUSE: ADULT

## 2025-02-19 SDOH — SOCIAL STABILITY: SOCIAL INSECURITY: DO YOU FEEL ANYONE HAS EXPLOITED OR TAKEN ADVANTAGE OF YOU FINANCIALLY OR OF YOUR PERSONAL PROPERTY?: NO

## 2025-02-19 SDOH — SOCIAL STABILITY: SOCIAL INSECURITY: ARE THERE ANY APPARENT SIGNS OF INJURIES/BEHAVIORS THAT COULD BE RELATED TO ABUSE/NEGLECT?: NO

## 2025-02-19 SDOH — SOCIAL STABILITY: SOCIAL INSECURITY: HAVE YOU HAD ANY THOUGHTS OF HARMING ANYONE ELSE?: NO

## 2025-02-19 SDOH — SOCIAL STABILITY: SOCIAL INSECURITY: DO YOU FEEL UNSAFE GOING BACK TO THE PLACE WHERE YOU ARE LIVING?: NO

## 2025-02-19 SDOH — SOCIAL STABILITY: SOCIAL INSECURITY: ARE YOU OR HAVE YOU BEEN THREATENED OR ABUSED PHYSICALLY, EMOTIONALLY, OR SEXUALLY BY ANYONE?: NO

## 2025-02-19 SDOH — SOCIAL STABILITY: SOCIAL INSECURITY: DOES ANYONE TRY TO KEEP YOU FROM HAVING/CONTACTING OTHER FRIENDS OR DOING THINGS OUTSIDE YOUR HOME?: NO

## 2025-02-19 SDOH — SOCIAL STABILITY: SOCIAL INSECURITY: HAS ANYONE EVER THREATENED TO HURT YOUR FAMILY OR YOUR PETS?: NO

## 2025-02-19 SDOH — SOCIAL STABILITY: SOCIAL INSECURITY: HAVE YOU HAD THOUGHTS OF HARMING ANYONE ELSE?: NO

## 2025-02-19 ASSESSMENT — ENCOUNTER SYMPTOMS
POLYDIPSIA: 1
UNEXPECTED WEIGHT CHANGE: 1
PSYCHIATRIC NEGATIVE: 1
FATIGUE: 1
BACK PAIN: 1
WHEEZING: 0
APPETITE CHANGE: 1
ALLERGIC/IMMUNOLOGIC NEGATIVE: 1
POLYPHAGIA: 1
SHORTNESS OF BREATH: 0
PALPITATIONS: 0
DIFFICULTY URINATING: 0
NEUROLOGICAL NEGATIVE: 1
NAUSEA: 0
ABDOMINAL DISTENTION: 0
COUGH: 1
VOMITING: 0
HEMATOLOGIC/LYMPHATIC NEGATIVE: 1
APNEA: 0
FLANK PAIN: 0
CONSTIPATION: 0
DIARRHEA: 0
ACTIVITY CHANGE: 1

## 2025-02-19 ASSESSMENT — COGNITIVE AND FUNCTIONAL STATUS - GENERAL
PATIENT BASELINE BEDBOUND: NO
DAILY ACTIVITIY SCORE: 24
MOBILITY SCORE: 24

## 2025-02-19 ASSESSMENT — COLUMBIA-SUICIDE SEVERITY RATING SCALE - C-SSRS
1. IN THE PAST MONTH, HAVE YOU WISHED YOU WERE DEAD OR WISHED YOU COULD GO TO SLEEP AND NOT WAKE UP?: NO
6. HAVE YOU EVER DONE ANYTHING, STARTED TO DO ANYTHING, OR PREPARED TO DO ANYTHING TO END YOUR LIFE?: NO
2. HAVE YOU ACTUALLY HAD ANY THOUGHTS OF KILLING YOURSELF?: NO

## 2025-02-19 ASSESSMENT — ACTIVITIES OF DAILY LIVING (ADL)
JUDGMENT_ADEQUATE_SAFELY_COMPLETE_DAILY_ACTIVITIES: YES
HEARING - RIGHT EAR: FUNCTIONAL
GROOMING: INDEPENDENT
ADEQUATE_TO_COMPLETE_ADL: YES
FEEDING YOURSELF: INDEPENDENT
TOILETING: INDEPENDENT
WALKS IN HOME: INDEPENDENT
PATIENT'S MEMORY ADEQUATE TO SAFELY COMPLETE DAILY ACTIVITIES?: YES
BATHING: INDEPENDENT
HEARING - LEFT EAR: FUNCTIONAL
DRESSING YOURSELF: INDEPENDENT

## 2025-02-19 ASSESSMENT — LIFESTYLE VARIABLES
AUDIT-C TOTAL SCORE: 1
HOW MANY STANDARD DRINKS CONTAINING ALCOHOL DO YOU HAVE ON A TYPICAL DAY: 1 OR 2
SUBSTANCE_ABUSE_PAST_12_MONTHS: NO
HOW OFTEN DO YOU HAVE A DRINK CONTAINING ALCOHOL: MONTHLY OR LESS
PRESCIPTION_ABUSE_PAST_12_MONTHS: NO
SKIP TO QUESTIONS 9-10: 1
HOW OFTEN DO YOU HAVE 6 OR MORE DRINKS ON ONE OCCASION: NEVER
AUDIT-C TOTAL SCORE: 1

## 2025-02-19 ASSESSMENT — PAIN SCALES - GENERAL
PAINLEVEL_OUTOF10: 0 - NO PAIN

## 2025-02-19 ASSESSMENT — PAIN - FUNCTIONAL ASSESSMENT
PAIN_FUNCTIONAL_ASSESSMENT: 0-10

## 2025-02-19 ASSESSMENT — PATIENT HEALTH QUESTIONNAIRE - PHQ9
1. LITTLE INTEREST OR PLEASURE IN DOING THINGS: NOT AT ALL
SUM OF ALL RESPONSES TO PHQ9 QUESTIONS 1 & 2: 0
2. FEELING DOWN, DEPRESSED OR HOPELESS: NOT AT ALL

## 2025-02-19 NOTE — PROGRESS NOTES
Desiree Shepard is a 64 y.o. female on day 1 of admission presenting with Influenza A.      Subjective   Patient reports she is thirsty and a little nauseated. No pain at this time.      SOB overnight. She said because it was so hot. Currently on 5L NC, pulsox 97%.    Objective     Last Recorded Vitals  BP 87/59   Pulse 93   Temp 37.6 °C (99.7 °F) (Oral)   Resp 20   Wt 54.4 kg (120 lb)   SpO2 98%   Intake/Output last 3 Shifts:  No intake or output data in the 24 hours ending 02/19/25 0933    Admission Weight  Weight: 54.4 kg (120 lb) (02/18/25 0945)    Daily Weight  02/18/25 : 54.4 kg (120 lb)    Image Results  ECG 12 lead  Normal sinus rhythm  Possible Left atrial enlargement  Prolonged QT  Abnormal ECG  When compared with ECG of 18-FEB-2025 09:48, (unconfirmed)  QRS axis Shifted right  Confirmed by Pradeep Tripp ([id number]) on 2/18/2025 9:33:26 PM  ECG 12 lead   Normal sinus rhythm  Left axis deviation   Poor R-wave progressionÂ   Prolonged QT  Abnormal ECG  No previous ECGs available  Â   Confirmed by Pradeep Tripp ([id number]) on 2/18/2025 9:30:22 PM  XR chest 2 views  Narrative: Interpreted By:  Calin Grover,   STUDY:  XR CHEST 2 VIEWS;  2/18/2025 10:30 am      INDICATION:  Signs/Symptoms:cough, congestion, chest pain.      COMPARISON:  Prior exam is from 01/15/2022      ACCESSION NUMBER(S):  [id number]      ORDERING CLINICIAN:  ZOHRA DIAZ      TECHNIQUE:  PA and lateral views of the chest were obtained.      FINDINGS:  MEDIASTINUM/LUNGS/WADE:  No cardiomegaly, vascular congestion, or pleural effusion.  Nonspecific hyperinflation of the lungs.  No abnormal opacity in  either lung worrisome for tumor or pneumonia. No pneumothorax.  No tracheal deviation.  No abnormal hilar fullness or gross mass on either side.      BONES:  No lytic or blastic destructive bone lesion.      UPPER ABDOMEN:  Grossly intact.      Impression: Nonspecific hyperinflation of the lungs. Otherwise, negative exam.      MACRO:  None       Signed by: Calin Grover 2/18/2025 10:54 AM  Dictation workstation:   [id number]  ECG 12 lead (Clinic Performed)  Abnormal- septal infarct was established on EKG but no comparison, pt requires continuous EKG monitoring      Physical Exam  General: alert, no diaphoresis   Lungs: CTA BL   Heart: regular and tachy, no LE edema BL   GI: abdomen soft, nontender, nondistended, BS present   MSK: no joint effusion or deformity   Skin: no rashes, erythema, or ecchymosis   Neuro: grossly normal cognition, motor strength, sensation    Relevant Results               Assessment/Plan                  Assessment & Plan  NSTEMI (non-ST elevated myocardial infarction) (Multi)  Started on heparin drip  Cardiology following  Cath today  Loaded with 324 mg of asa  Asa daily  Will need high-intensity statin  Discussed with Dr. Hughes.  He is planning on doingHeart cath soon.  He is aware of the changes in her labs and her DKA.    Dm type 2:  poorly controlled  Patient on Jardiance and Trulicity- both on hold  A1c 11.2  This morning with low bicarb and elevated anion gap. Needs stat VBG, lactate, ketones  Concerned she is in DKA. If labs confirms, she will need to go to ICU for insulin drip.   *Labs confirmed that she is in DKA.  pH is 7.11 and ketones have climbed from 3, yesterday, up to 11 today.  Bicarb continues to drop and is now 8.  I discussed the case with the intensivist, Dr. Ivory, agrees with transfer to the ICU.  Will give a 1 L fluid bolus followed by bicarb drip per Dr. Ivory's request.  I have put in the DKA orders including insulin drip.      Depression/Anxiety:  resume home medications  Influenza A  Symptom management.  Start tamiflu    Acute respiratory failure with hypoxia (Multi)  Unclear if this is flu a causing respiratory failure or possible fluid overload.  An echo was done but it is not read.  proBNP was added on to last night's labs in am not sure how beneficial this will be.  Ordered a stat chest  x-ray but this has not been done yet.  I am not sure she actually requires the 5 L she is currently on.  It does not look like there is a true desaturation event yesterday other than much earlier in her ER stay.  Discussed with Dr. Hughes and Dr. Ivory       CCT: 55 min         Roslyn Goyal, DO

## 2025-02-19 NOTE — PROGRESS NOTES
"Desiree Shepard is a 64 y.o. female on day 1 of admission presenting with Influenza A.    Subjective   [facility] resting comfortably.       Objective     Physical Exam   Gen: NAD   Neck: no JVD, carotid upstroke is brisk and without delay   Heart: rrr, s1s2+ no mrg   Lungs: CTA   Ext: warm no edema    Last Recorded Vitals  Blood pressure 84/56, pulse 82, temperature 36.8 °C (98.3 °F), temperature source Oral, resp. rate 19, height 1.549 m (5' 1\"), weight 54.4 kg (120 lb), SpO2 100%.  Intake/Output last 3 Shifts:  No intake/output data recorded.        Assessment/Plan   Assessment & Plan  Influenza A    Acute respiratory failure with hypoxia (Multi)    NSTEMI (non-ST elevated myocardial infarction) (Multi)    Influenza A  Chest pain  Elevated Troponin's  Hyperlipidemia  Diabetes  Anxiety      2/18: As above. Patient was seen in the urgent care this morning and tested positive for Influenza A. She complained of chest pain at the urgent care and was advised to come to the ED. The patient describes the chest discomfort as a dull pain and a feeling of soreness that radiated across her chest. She states that this pain started about 2 days ago.  Most recent set of vitals include a heart rate 85 blood pressure 120/64 satting 100% on room air.  Upon assessment the patient was wearing 2 L nasal cannula.  Patient denies shortness of breath or any difficulty breathing.  Patient appears euvolemic with no signs of edema or swelling in any of her extremities.  On telemetry she is in sinus rhythm.  With no cardiac history noted, an echocardiogram will be completed sometime today.  With her severe increase in troponin's, from 382 to 954, patient will go for left heart cath procedure tomorrow.  NPO at midnight.  We will continue to follow with you.    2/19: The patient is now in diabetic ketoacidosis.  Labs prior to her catheterization revealed a pH of 7.19.  Rather than performing diagnostic angiography the " decision was made to send her up to the ICU for medical stabilization.  Will tentatively plan for left heart catheterization tomorrow.  Will continue to follow this patient with you.         Navin Hughes, DO

## 2025-02-19 NOTE — H&P
[facility] History & Physical      Date:  2/19/2025  Patient:  Desiree Shepard  YOB: 1960  MRN:  [id number]   Admit Date:  2/18/2025  Chief Complaint   Patient presents with    Chest Pain     Patient is currently positive for Flu A and is now experiencing chest pain that started about 2 days ago.  States it as a haed pain that spreads across her chest. Sent from urgent care for further eval        History of Present Illness:  Desiree Shepard is a 64 year old female with pmhx of recurrent major depressive disorder, DM, mixed hyperlipidemia, anxiety, and lumbar spondylosis. Patient was admitted on 2/18 with new onset chest pain, crushing in nature. Patient also complaining of malaise that has present for atleast a month. Patient denies radiation, n/v, syncope, chest palpitations or sob. Patients labs in ED concerning for hyperglycemia of 517, elevated troponin of 382 and repeat of 954, elevated beta hydroxybutyrate of 3.06 hba1c of 11.2 and positive flu a. 12 lead on admission was negative for st elevation. Patient at this point was admitted to step down with plan of left heart cath on 2/19 was started on a heparin gtt.    Am labs 2/19 showing worsening dka with elevated beta hydroxybutyrate of 11.14 glucose of 302, and vbg showing metabolic acidosis ph of 7.19 co2 of 20 bicarb of 7.6 and lactate of 1.6. Patient was transferred to ICU as cardiology wanted stabilization of patient before performing left heart cath. Follow up C planned for 2/20.    ROS:  Review of Systems   Constitutional:  Positive for activity change, appetite change, fatigue and unexpected weight change.   HENT:  Positive for congestion and postnasal drip.    Respiratory:  Positive for cough. Negative for apnea, shortness of breath and wheezing.    Cardiovascular:  Positive for chest pain. Negative for palpitations and leg swelling.   Gastrointestinal:  Negative for abdominal distention, constipation, diarrhea, nausea  and vomiting.   Endocrine: Positive for polydipsia, polyphagia and polyuria.   Genitourinary:  Negative for difficulty urinating and flank pain.   Musculoskeletal:  Positive for back pain.   Skin: Negative.    Allergic/Immunologic: Negative.    Neurological: Negative.    Hematological: Negative.    Psychiatric/Behavioral: Negative.         Medical History:  Past Medical History:   Diagnosis Date    Acid reflux     Age-related nuclear cataract of both eyes 6/13/2024    Diabetes mellitus (Multi)     Herpes     Hiatal hernia     High cholesterol     History of HPV infection     IBS (irritable bowel syndrome)     Insomnia     Rosacea     Unspecified disorder of refraction 6/13/2024     Past Surgical History:   Procedure Laterality Date    APPENDECTOMY  2005    COLONOSCOPY  2015    TORTUOUS COLON- REPEAT IN 5 YEARS--DR. HERNANDES    HERNIA REPAIR  01/2018    TUBAL LIGATION Bilateral 11/1991     Medications Prior to Admission   Medication Sig Dispense Refill Last Dose/Taking    chlorpheniramine/dextromethorp (CORICIDIN HBP COUGH AND COLD ORAL) Take 1 each by mouth once daily as needed.   2/18/2025    cholecalciferol (Vitamin D-3) 25 MCG (1000 UT) capsule Take 1 capsule (25 mcg) by mouth once every 24 hours.   2/18/2025    cyanocobalamin (Vitamin B-12) 1,000 mcg tablet Take 1 tablet (1,000 mcg) by mouth once daily.   2/18/2025    empagliflozin (Jardiance) 25 mg Take 1 tablet (25 mg) by mouth once daily. 90 tablet 3 2/18/2025    hydrOXYzine HCL (Atarax) 25 mg tablet TAKE TWO TABLETS BY MOUTH EVERY 8 HOURS FOR ANXIETY 270 tablet 3 2/18/2025    MILK THISTLE ORAL Take 1 each by mouth once daily.   2/18/2025    multivitamin tablet Take 1 tablet by mouth once daily.   2/18/2025    PARoxetine (Paxil) 40 mg tablet Take 1.5 tablets (60 mg) by mouth once daily. 135 tablet 3 2/18/2025    pravastatin (Pravachol) 20 mg tablet Take 1 tablet (20 mg) by mouth once daily. 90 tablet 3 2/18/2025    traZODone (Desyrel) 100 mg tablet TAKE TWO  TABLETS BY MOUTH EVERY  tablet 3 2/17/2025    Trulicity 4.5 mg/0.5 mL pen injector INJECT 4.5MG UNDER THE SKIN ONCE WEEKLY 2 mL 3 2/17/2025    TURMERIC ORAL Take 1 each by mouth once daily.   2/18/2025    benzonatate (Tessalon) 200 mg capsule Take 1 capsule (200 mg) by mouth 3 times a day as needed for cough for up to 7 days. Do not crush or chew. (Patient not taking: Reported on 2/18/2025) 30 capsule 0 Not Taking    blood-glu meter,cont-transmit misc as directed   Unknown    blood-glucose sensor (Dexcom G7 Sensor) device Change every 10 DAYS 3 each 11 Unknown    guaiFENesin (Mucinex) 1,200 mg tablet extended release 12hr Take 1 tablet (1,200 mg) by mouth every 12 hours if needed (cough) for up to 7 days. (Patient not taking: Reported on 2/18/2025) 14 tablet 0 Not Taking    zoster vaccine-recombinant adjuvanted (Shingrix) 50 mcg/0.5 mL vaccine Inject 0.5 mL into the muscle every 8 (eight) weeks. (Patient not taking: Reported on 9/5/2024) 1 mL 0      Penicillin, Statins-hmg-coa reductase inhibitors, and Sulfamethoxazole-trimethoprim  Social History     Tobacco Use    Smoking status: Never     Passive exposure: Never    Smokeless tobacco: Never   Vaping Use    Vaping status: Never Used   Substance Use Topics    Alcohol use: Not Currently    Drug use: Never     Family History   Problem Relation Name Age of Onset    Ulcerative colitis Mother      Throat cancer Father      Aneurysm Father          under his belly button - inherited    Leukemia Father      Other (abdominal aortic aneurysm) Father      Diabetes Father      Hypertension Father      Lupus Sister      Other (factor v) Sister      Blood clot Sister      Cancer Sister      Heart attack Brother  40    Testicular cancer Brother      Deep vein thrombosis Brother      Heart disease Brother      Hypertension Brother      Lupus Daughter      Ulcerative colitis Daughter      Obesity Daughter      Cancer Daughter      No Known Problems Daughter      No Known  Problems Daughter      Other (abdominal aortic aneurysm) Paternal Grandfather      Lupus Half-Sister         [facility] Medications:  dextrose 10 % in water (D10W), 150 mL/hr  dextrose 5%-0.45 % sodium chloride, 150 mL/hr  heparin, 0-4,000 Units/hr, Last Rate: 800 Units/hr (02/19/25 1438)  insulin regular, 0-50 Units/hr, Last Rate: 7 Units/hr (02/19/25 1439)  sodium bicarbonate 1 mEq/mL (8.4 %) 150 mEq in dextrose 5% 1,150 mL infusion, 75 mL/hr, Last Rate: 75 mL/hr (02/19/25 1435)          Current Facility-Administered Medications:     aspirin chewable tablet 81 mg, 81 mg, oral, Daily, Aman Sears, PA-C, 81 mg at 02/19/25 0851    dextrose 10 % in water (D10W) infusion, 150 mL/hr, intravenous, Continuous PRN, Florian R Kiss, APRN-CNP    dextrose 5%-0.45 % sodium chloride infusion, 150 mL/hr, intravenous, Continuous PRN, Florian R Kiss, APRN-CNP    dextrose 50 % injection 12.5 g, 12.5 g, intravenous, q15 min PRN, Aman Sears, PA-C    dextrose 50 % injection 25 g, 25 g, intravenous, q15 min PRN, Aman Sears, PA-C    dextrose 50 % injection 50 mL, 50 mL, intravenous, q15 min PRN, Florian R Kiss, APRN-CNP    [Held by provider] empagliflozin (Jardiance) tablet 25 mg, 25 mg, oral, Daily, Florian R Kiss, APRN-CNP    glucagon (Glucagen) injection 1 mg, 1 mg, intramuscular, q15 min PRN, Aman Sears, PA-C    heparin (porcine) injection 1,500-3,000 Units, 1,500-3,000 Units, intravenous, PRN, Florian R Kiss, APRN-CNP    heparin 25,000 Units in dextrose 5% 250 mL (100 Units/mL) infusion (premix), 0-4,000 Units/hr, intravenous, Continuous, Florian R Kiss, APRN-CNP, Last Rate: 8 mL/hr at 02/19/25 1438, 800 Units/hr at 02/19/25 1438    hydrOXYzine HCL (Atarax) tablet 25 mg, 25 mg, oral, q8h PRN, Florian R Kiss, APRN-CNP    insulin regular (HumuLIN, NovoLIN) bolus from bag 5 Units, 0.1 Units/kg, intravenous, PRN, Florian R Kiss, APRN-CNP    insulin regular 100 unit/100 mL (1 unit/mL) in 0.9 % NaCl infusion, 0-50 Units/hr, intravenous,  Continuous, Florian R Kiss, APRN-CNP, Last Rate: 7 mL/hr at 02/19/25 1439, 7 Units/hr at 02/19/25 1439    ipratropium-albuteroL (Duo-Neb) 0.5-2.5 mg/3 mL nebulizer solution 3 mL, 3 mL, nebulization, q6h PRN, Aman Sears, PA-C    oseltamivir (Tamiflu) suspension 30 mg, 30 mg, oral, Daily, Aman Sears, PA-C    pantoprazole (ProtoNix) EC tablet 40 mg, 40 mg, oral, Daily before breakfast, Aman Sears, PA-C, 40 mg at 02/19/25 0626    [Held by provider] PARoxetine (Paxil) tablet 60 mg, 60 mg, oral, Daily, Amanrenetta Sears, PA-C    prochlorperazine (Compazine) injection 5 mg, 5 mg, intravenous, q6h PRN, Aman Sears, PA-C    sodium bicarbonate 1 mEq/mL (8.4 %) 150 mEq in dextrose 5% 1,150 mL infusion, 75 mL/hr, intravenous, Continuous, Florian R Kiss, APRN-CNP, Last Rate: 75 mL/hr at 02/19/25 1435, 75 mL/hr at 02/19/25 1435    ticagrelor (Brilinta) tablet 180 mg, 180 mg, oral, Once **FOLLOWED BY** ticagrelor (Brilinta) tablet 90 mg, 90 mg, oral, BID, Aman Sears, PA-C, 90 mg at 02/19/25 0626    traZODone (Desyrel) tablet 200 mg, 200 mg, oral, Nightly, Aman Sears, PA-C, 200 mg at 02/19/25 0136    Physical Exam:  Heart Rate:  []   Temp:  [36.9 °C (98.4 °F)-37.6 °C (99.7 °F)]   Resp:  [17-27]   BP: ()/(54-70)   SpO2:  [91 %-99 %]          Physical Exam  HENT:      Mouth/Throat:      Mouth: Mucous membranes are dry.      Pharynx: Oropharynx is clear.   Eyes:      Pupils: Pupils are equal, round, and reactive to light.   Cardiovascular:      Rate and Rhythm: Normal rate and regular rhythm.      Pulses: Normal pulses.   Pulmonary:      Effort: Pulmonary effort is normal.   Abdominal:      General: Abdomen is flat.      Palpations: Abdomen is soft.   Musculoskeletal:         General: Normal range of motion.   Skin:     General: Skin is warm and dry.      Capillary Refill: Capillary refill takes less than 2 seconds.   Neurological:      General: No focal deficit present.      Mental Status: She is alert and oriented to  person, place, and time. Mental status is at baseline.         Objective:  No intake or output data in the 24 hours ending 02/19/25 1516    Results for orders placed or performed during the hospital encounter of 02/18/25 (from the past 24 hours)   APTT   Result Value Ref Range    aPTT 60.1 (H) 22.0 - 32.5 seconds   Transthoracic Echo (TTE) Complete   Result Value Ref Range    BSA 1.53 m2   POCT GLUCOSE   Result Value Ref Range    POCT Glucose 103 (H) 74 - 99 mg/dL   APTT   Result Value Ref Range    aPTT 57.1 (H) 22.0 - 32.5 seconds   aPTT   Result Value Ref Range    aPTT 32.5 22.0 - 32.5 seconds   Comprehensive metabolic panel   Result Value Ref Range    Glucose 347 (H) 74 - 99 mg/dL    Sodium 135 (L) 136 - 145 mmol/L    Potassium 4.3 3.5 - 5.3 mmol/L    Chloride 99 98 - 107 mmol/L    Bicarbonate 11 (L) 21 - 32 mmol/L    Anion Gap 29 (H) 10 - 20 mmol/L    Urea Nitrogen 14 6 - 23 mg/dL    Creatinine 0.85 0.50 - 1.05 mg/dL    eGFR 77 >60 mL/min/1.73m*2    Calcium 8.5 (L) 8.6 - 10.3 mg/dL    Albumin 3.7 3.4 - 5.0 g/dL    Alkaline Phosphatase 87 33 - 136 U/L    Total Protein 6.3 (L) 6.4 - 8.2 g/dL    AST 34 9 - 39 U/L    Bilirubin, Total 0.4 0.0 - 1.2 mg/dL    ALT 22 7 - 45 U/L   Lipid panel   Result Value Ref Range    Cholesterol 179 0 - 199 mg/dL    HDL-Cholesterol 43.6 mg/dL    Cholesterol/HDL Ratio 4.1     LDL Calculated      VLDL      Triglycerides 417 (H) 0 - 149 mg/dL    Non HDL Cholesterol 135 0 - 149 mg/dL   POCT GLUCOSE   Result Value Ref Range    POCT Glucose 330 (H) 74 - 99 mg/dL   Blood Gas Lactic Acid, Venous   Result Value Ref Range    POCT Lactate, Venous 1.4 0.4 - 2.0 mmol/L   Beta Hydroxybutyrate   Result Value Ref Range    Beta-Hydroxybutyrate 11.14 (H) 0.02 - 0.27 mmol/L   Basic Metabolic Panel   Result Value Ref Range    Glucose 302 (H) 74 - 99 mg/dL    Sodium 136 136 - 145 mmol/L    Potassium 4.0 3.5 - 5.3 mmol/L    Chloride 101 98 - 107 mmol/L    Bicarbonate 8 (LL) 21 - 32 mmol/L    Anion Gap 31  (H) 10 - 20 mmol/L    Urea Nitrogen 15 6 - 23 mg/dL    Creatinine 1.01 0.50 - 1.05 mg/dL    eGFR 63 >60 mL/min/1.73m*2    Calcium 8.5 (L) 8.6 - 10.3 mg/dL   BLOOD GAS VENOUS FULL PANEL   Result Value Ref Range    POCT pH, Venous 7.19 (LL) 7.33 - 7.43 pH    POCT pCO2, Venous 20 (L) 41 - 51 mm Hg    POCT pO2, Venous 63 (H) 35 - 45 mm Hg    POCT SO2, Venous 91 (H) 45 - 75 %    POCT Oxy Hemoglobin, Venous 90.9 (H) 45.0 - 75.0 %    POCT Hematocrit Calculated, Venous 45.0 36.0 - 46.0 %    POCT Sodium, Venous 132 (L) 136 - 145 mmol/L    POCT Potassium, Venous 5.4 (H) 3.5 - 5.3 mmol/L    POCT Chloride, Venous 101 98 - 107 mmol/L    POCT Ionized Calicum, Venous 1.18 1.10 - 1.33 mmol/L    POCT Glucose, Venous 291 (H) 74 - 99 mg/dL    POCT Lactate, Venous 1.6 0.4 - 2.0 mmol/L    POCT Base Excess, Venous -18.4 (L) -2.0 - 3.0 mmol/L    POCT HCO3 Calculated, Venous 7.6 (L) 22.0 - 26.0 mmol/L    POCT Hemoglobin, Venous 15.0 12.0 - 16.0 g/dL    POCT Anion Gap, Venous 29.0 (H) 10.0 - 25.0 mmol/L    Patient Temperature 37.0 degrees Celsius    FiO2 21 %   POCT GLUCOSE   Result Value Ref Range    POCT Glucose 193 (H) 74 - 99 mg/dL   Basic metabolic panel   Result Value Ref Range    Glucose 190 (H) 74 - 99 mg/dL    Sodium 138 136 - 145 mmol/L    Potassium 3.7 3.5 - 5.3 mmol/L    Chloride 106 98 - 107 mmol/L    Bicarbonate 16 (L) 21 - 32 mmol/L    Anion Gap 20 10 - 20 mmol/L    Urea Nitrogen 13 6 - 23 mg/dL    Creatinine 0.94 0.50 - 1.05 mg/dL    eGFR 68 >60 mL/min/1.73m*2    Calcium 8.1 (L) 8.6 - 10.3 mg/dL   Magnesium   Result Value Ref Range    Magnesium 2.04 1.60 - 2.40 mg/dL   POCT GLUCOSE   Result Value Ref Range    POCT Glucose 187 (H) 74 - 99 mg/dL   POCT GLUCOSE   Result Value Ref Range    POCT Glucose 177 (H) 74 - 99 mg/dL       Recent Imaging  Transthoracic Echo (TTE) Complete         XR chest 2 views   Final Result   Nonspecific hyperinflation of the lungs. Otherwise, negative exam.        MACRO:   None        Signed by:  Calin Grover 2/18/2025 10:54 AM   Dictation workstation:   [id number]      Cardiac Catheterization Procedure    (Results Pending)   XR chest 1 view    (Results Pending)       No echocardiogram results found for the past 14 days    Assessment/Plan   Assessment & Plan  Influenza A    Acute respiratory failure with hypoxia (Multi)    NSTEMI (non-ST elevated myocardial infarction) (Multi)      Pt is a 64 year old female with pmhx of recurrent major depressive disorder, DM, mixed hyperlipidemia, anxiety, and lumbar spondylosis. Patient admited to ICU for DKA secondary to positive influenzae a.    Plan:  Neuro/Psych/Pain Ctrl/Sedation:  Acute pain/Chest pain  Major Depressive disorder/Anxiety  Lumbar Spondylosis   - CAM ICU score q-shift  - Sleep/wake cycle hygiene  - Delirium precaution   - Pain Management: Tylenol, Morphine for chest pain  - Q4 Neuro assessments  - Continue home trazodone at night and daily paxil    Respiratory/ENT:  Positive flu a  CXR 2/19: Perihilar infiltrates bilaterally, R>L,  - Continuous Pulse oximetry  - Wean Fio2 to maintain Spo2>92%  - Bronchial hygiene and IS  - Stopped tamiflu as patient was outside of treatment window based on her reported time of initial symptoms     Cardiovascular:  NSTEMI  Hyperlipidemia   No previous echo on file  - Continuous Telemetry  - Systolic goals >90 and < 160  - 12 leads prn for any new cp  - Echo pending read  - [facility] rescheduled for 2/20  - Cardiology following will appreciate recs  - Heparin gtt in setting of NSTEMI, titrate based on aptt    Renal/Volume Status (Intra & Extravascular):  Elevated anion gap metabolic acidosis   - Maintain urine output 0.5-1.0cc/kg/hr  - IVF per dka protocol   - Monitor I/O's  - Replete electrolytes to maintain K >4.0 and Mg >2.0  - Q4 RFP and mags  - Bicarb gtt stared to supplement drop consider stopping tonight    GI:  - Diet: npo ice chips and sips with meds  - PPI: home ppi  - BR: miralax prn    Infectious  Disease:  Influenza A positive  - Daily CBC    Heme/Onc:  - DVT Prophylaxis: SCDs and heparin gtt  - Transfuse for hemoglobin < 7    Endocrine:  Diabetic ketoacidosis w/out coma  DM  - POCT Q1 while on insulin gtt  - HGB A1C 11.2  - TSH normal    OBGYN/Ortho:  - PT/OT when stable    Ethics/Code Status:  Full Code    Lab Draw Frequency:  Q4 rfps    :  DVT Prophylaxis: SCDs and heparin gtt  GI Prophylaxis: PPI home  Bowel Regimen: Miralax prn  Diet: NPO with ice chips  CVC: None  Asya: None  Tate: None  Restraints: None  Dispo: Will be admitted to the ICU    Critical Care Time:  70 minutes spent in preparing to see patient (I.e. review of medical records), evaluation of diagnostics (I.e. labs, imaging, etc.), documentation, discussing plan of care with patient/ family/ caregiver, and/ or coordination of care with multidisciplinary team. Time does not include completion of procedure time.     Critical Care Activities:     Florian Pleitez, APRN-CNP

## 2025-02-19 NOTE — ASSESSMENT & PLAN NOTE
Unclear if this is flu a causing respiratory failure or possible fluid overload.  An echo was done but it is not read.  proBNP was added on to last night's labs in am not sure how beneficial this will be.  Ordered a stat chest x-ray but this has not been done yet.  I am not sure she actually requires the 5 L she is currently on.  It does not look like there is a true desaturation event yesterday other than much earlier in her ER stay.  Discussed with Dr. Hughes and Dr. Ivory

## 2025-02-19 NOTE — ASSESSMENT & PLAN NOTE
Started on heparin drip  Cardiology following  Cath today  Loaded with 324 mg of asa  Asa daily  Will need high-intensity statin  Discussed with Dr. Hughes.  He is planning on doingHeart cath soon.  He is aware of the changes in her labs and her DKA.    Dm type 2:  poorly controlled  Patient on Jardiance and Trulicity- both on hold  A1c 11.2  This morning with low bicarb and elevated anion gap. Needs stat VBG, lactate, ketones  Concerned she is in DKA. If labs confirms, she will need to go to ICU for insulin drip.   *Labs confirmed that she is in DKA.  pH is 7.11 and ketones have climbed from 3, yesterday, up to 11 today.  Bicarb continues to drop and is now 8.  I discussed the case with the intensivist, Dr. Ivory, agrees with transfer to the ICU.  Will give a 1 L fluid bolus followed by bicarb drip per Dr. Ivory's request.  I have put in the DKA orders including insulin drip.      Depression/Anxiety:  resume home medications

## 2025-02-20 ENCOUNTER — APPOINTMENT (OUTPATIENT)
Dept: CARDIOLOGY | Facility: HOSPITAL | Age: 65
End: 2025-02-20
Payer: COMMERCIAL

## 2025-02-20 LAB
ACT BLD: 136 SEC (ref 89–169)
ALBUMIN SERPL BCP-MCNC: 2.7 G/DL (ref 3.4–5)
ALBUMIN SERPL BCP-MCNC: 2.8 G/DL (ref 3.4–5)
ALBUMIN SERPL BCP-MCNC: 3 G/DL (ref 3.4–5)
ANION GAP BLDV CALCULATED.4IONS-SCNC: 16 MMOL/L (ref 10–25)
ANION GAP SERPL CALCULATED.3IONS-SCNC: 11 MMOL/L (ref 10–20)
ANION GAP SERPL CALCULATED.3IONS-SCNC: 11 MMOL/L (ref 10–20)
ANION GAP SERPL CALCULATED.3IONS-SCNC: 14 MMOL/L (ref 10–20)
ANION GAP SERPL CALCULATED.3IONS-SCNC: 16 MMOL/L (ref 10–20)
ANION GAP SERPL CALCULATED.3IONS-SCNC: 17 MMOL/L (ref 10–20)
ANION GAP SERPL CALCULATED.3IONS-SCNC: 18 MMOL/L (ref 10–20)
AORTIC VALVE PEAK VELOCITY: 0.97 M/S
APTT PPP: 44 SECONDS (ref 22–32.5)
ATRIAL RATE: 80 BPM
ATRIAL RATE: 90 BPM
AV PEAK GRADIENT: 4 MMHG
AVA (PEAK VEL): 2.54 CM2
B-OH-BUTYR SERPL-SCNC: 5.39 MMOL/L (ref 0.02–0.27)
BASE EXCESS BLDV CALC-SCNC: -6.7 MMOL/L (ref -2–3)
BASOPHILS # BLD AUTO: 0.01 X10*3/UL (ref 0–0.1)
BASOPHILS NFR BLD AUTO: 0.1 %
BODY TEMPERATURE: 37 DEGREES CELSIUS
BUN SERPL-MCNC: 11 MG/DL (ref 6–23)
BUN SERPL-MCNC: 12 MG/DL (ref 6–23)
BUN SERPL-MCNC: 13 MG/DL (ref 6–23)
BUN SERPL-MCNC: 13 MG/DL (ref 6–23)
BUN SERPL-MCNC: 9 MG/DL (ref 6–23)
BUN SERPL-MCNC: 9 MG/DL (ref 6–23)
CA-I BLD-SCNC: 1.04 MMOL/L (ref 1.1–1.33)
CA-I BLDV-SCNC: 1 MMOL/L (ref 1.1–1.33)
CALCIUM SERPL-MCNC: 7.1 MG/DL (ref 8.6–10.3)
CALCIUM SERPL-MCNC: 7.2 MG/DL (ref 8.6–10.3)
CALCIUM SERPL-MCNC: 7.3 MG/DL (ref 8.6–10.3)
CALCIUM SERPL-MCNC: 7.4 MG/DL (ref 8.6–10.3)
CALCIUM SERPL-MCNC: 7.5 MG/DL (ref 8.6–10.3)
CALCIUM SERPL-MCNC: 7.5 MG/DL (ref 8.6–10.3)
CARDIAC TROPONIN I PNL SERPL HS: 1788 NG/L (ref 0–13)
CHLORIDE BLDV-SCNC: 101 MMOL/L (ref 98–107)
CHLORIDE SERPL-SCNC: 103 MMOL/L (ref 98–107)
CHLORIDE SERPL-SCNC: 104 MMOL/L (ref 98–107)
CHLORIDE SERPL-SCNC: 105 MMOL/L (ref 98–107)
CHLORIDE SERPL-SCNC: 105 MMOL/L (ref 98–107)
CHLORIDE SERPL-SCNC: 106 MMOL/L (ref 98–107)
CHLORIDE SERPL-SCNC: 106 MMOL/L (ref 98–107)
CO2 SERPL-SCNC: 18 MMOL/L (ref 21–32)
CO2 SERPL-SCNC: 19 MMOL/L (ref 21–32)
CO2 SERPL-SCNC: 19 MMOL/L (ref 21–32)
CO2 SERPL-SCNC: 21 MMOL/L (ref 21–32)
CO2 SERPL-SCNC: 22 MMOL/L (ref 21–32)
CO2 SERPL-SCNC: 22 MMOL/L (ref 21–32)
CREAT SERPL-MCNC: 0.64 MG/DL (ref 0.5–1.05)
CREAT SERPL-MCNC: 0.7 MG/DL (ref 0.5–1.05)
CREAT SERPL-MCNC: 0.71 MG/DL (ref 0.5–1.05)
CREAT SERPL-MCNC: 0.77 MG/DL (ref 0.5–1.05)
CREAT SERPL-MCNC: 0.78 MG/DL (ref 0.5–1.05)
CREAT SERPL-MCNC: 0.79 MG/DL (ref 0.5–1.05)
EGFRCR SERPLBLD CKD-EPI 2021: 84 ML/MIN/1.73M*2
EGFRCR SERPLBLD CKD-EPI 2021: 85 ML/MIN/1.73M*2
EGFRCR SERPLBLD CKD-EPI 2021: 87 ML/MIN/1.73M*2
EGFRCR SERPLBLD CKD-EPI 2021: >90 ML/MIN/1.73M*2
EJECTION FRACTION APICAL 4 CHAMBER: 35.1
EJECTION FRACTION: 33 %
EOSINOPHIL # BLD AUTO: 0 X10*3/UL (ref 0–0.7)
EOSINOPHIL NFR BLD AUTO: 0 %
ERYTHROCYTE [DISTWIDTH] IN BLOOD BY AUTOMATED COUNT: 13.3 % (ref 11.5–14.5)
GLOBAL LONGITUDINAL STRAIN: -5.9 %
GLUCOSE BLD MANUAL STRIP-MCNC: 108 MG/DL (ref 74–99)
GLUCOSE BLD MANUAL STRIP-MCNC: 109 MG/DL (ref 74–99)
GLUCOSE BLD MANUAL STRIP-MCNC: 121 MG/DL (ref 74–99)
GLUCOSE BLD MANUAL STRIP-MCNC: 141 MG/DL (ref 74–99)
GLUCOSE BLD MANUAL STRIP-MCNC: 162 MG/DL (ref 74–99)
GLUCOSE BLD MANUAL STRIP-MCNC: 173 MG/DL (ref 74–99)
GLUCOSE BLD MANUAL STRIP-MCNC: 177 MG/DL (ref 74–99)
GLUCOSE BLD MANUAL STRIP-MCNC: 179 MG/DL (ref 74–99)
GLUCOSE BLD MANUAL STRIP-MCNC: 181 MG/DL (ref 74–99)
GLUCOSE BLD MANUAL STRIP-MCNC: 182 MG/DL (ref 74–99)
GLUCOSE BLD MANUAL STRIP-MCNC: 184 MG/DL (ref 74–99)
GLUCOSE BLD MANUAL STRIP-MCNC: 204 MG/DL (ref 74–99)
GLUCOSE BLD MANUAL STRIP-MCNC: 218 MG/DL (ref 74–99)
GLUCOSE BLD MANUAL STRIP-MCNC: 222 MG/DL (ref 74–99)
GLUCOSE BLD MANUAL STRIP-MCNC: 240 MG/DL (ref 74–99)
GLUCOSE BLD MANUAL STRIP-MCNC: 241 MG/DL (ref 74–99)
GLUCOSE BLD MANUAL STRIP-MCNC: 243 MG/DL (ref 74–99)
GLUCOSE BLD MANUAL STRIP-MCNC: 244 MG/DL (ref 74–99)
GLUCOSE BLD MANUAL STRIP-MCNC: 247 MG/DL (ref 74–99)
GLUCOSE BLD MANUAL STRIP-MCNC: 251 MG/DL (ref 74–99)
GLUCOSE BLD MANUAL STRIP-MCNC: 259 MG/DL (ref 74–99)
GLUCOSE BLD MANUAL STRIP-MCNC: 260 MG/DL (ref 74–99)
GLUCOSE BLD MANUAL STRIP-MCNC: 265 MG/DL (ref 74–99)
GLUCOSE BLDV-MCNC: 445 MG/DL (ref 74–99)
GLUCOSE SERPL-MCNC: 139 MG/DL (ref 74–99)
GLUCOSE SERPL-MCNC: 194 MG/DL (ref 74–99)
GLUCOSE SERPL-MCNC: 210 MG/DL (ref 74–99)
GLUCOSE SERPL-MCNC: 236 MG/DL (ref 74–99)
GLUCOSE SERPL-MCNC: 240 MG/DL (ref 74–99)
GLUCOSE SERPL-MCNC: 99 MG/DL (ref 74–99)
HCO3 BLDV-SCNC: 18.3 MMOL/L (ref 22–26)
HCT VFR BLD AUTO: 36.1 % (ref 36–46)
HCT VFR BLD EST: 33 % (ref 36–46)
HGB BLD-MCNC: 12 G/DL (ref 12–16)
HGB BLDV-MCNC: 11.1 G/DL (ref 12–16)
IMM GRANULOCYTES # BLD AUTO: 0.04 X10*3/UL (ref 0–0.7)
IMM GRANULOCYTES NFR BLD AUTO: 0.3 % (ref 0–0.9)
INHALED O2 CONCENTRATION: 28 %
LACTATE BLDV-SCNC: 0.7 MMOL/L (ref 0.4–2)
LEFT ATRIUM VOLUME AREA LENGTH INDEX BSA: 38.3 ML/M2
LEFT VENTRICLE INTERNAL DIMENSION DIASTOLE: 4.14 CM (ref 3.5–6)
LEFT VENTRICULAR OUTFLOW TRACT DIAMETER: 1.95 CM
LV EJECTION FRACTION BIPLANE: 30 %
LYMPHOCYTES # BLD AUTO: 3.05 X10*3/UL (ref 1.2–4.8)
LYMPHOCYTES NFR BLD AUTO: 24.8 %
MAGNESIUM SERPL-MCNC: 1.82 MG/DL (ref 1.6–2.4)
MAGNESIUM SERPL-MCNC: 1.83 MG/DL (ref 1.6–2.4)
MAGNESIUM SERPL-MCNC: 1.95 MG/DL (ref 1.6–2.4)
MAGNESIUM SERPL-MCNC: 2.05 MG/DL (ref 1.6–2.4)
MAGNESIUM SERPL-MCNC: 2.44 MG/DL (ref 1.6–2.4)
MAGNESIUM SERPL-MCNC: 2.64 MG/DL (ref 1.6–2.4)
MCH RBC QN AUTO: 27.9 PG (ref 26–34)
MCHC RBC AUTO-ENTMCNC: 33.2 G/DL (ref 32–36)
MCV RBC AUTO: 84 FL (ref 80–100)
MITRAL VALVE E/A RATIO: 1.54
MONOCYTES # BLD AUTO: 0.75 X10*3/UL (ref 0.1–1)
MONOCYTES NFR BLD AUTO: 6.1 %
NEUTROPHILS # BLD AUTO: 8.46 X10*3/UL (ref 1.2–7.7)
NEUTROPHILS NFR BLD AUTO: 68.7 %
NRBC BLD-RTO: 0 /100 WBCS (ref 0–0)
OXYHGB MFR BLDV: 93.1 % (ref 45–75)
P AXIS: 61 DEGREES
P AXIS: 65 DEGREES
P OFFSET: 207 MS
P OFFSET: 210 MS
P ONSET: 154 MS
P ONSET: 157 MS
PCO2 BLDV: 34 MM HG (ref 41–51)
PH BLDV: 7.34 PH (ref 7.33–7.43)
PHOSPHATE SERPL-MCNC: 1.2 MG/DL (ref 2.5–4.9)
PHOSPHATE SERPL-MCNC: 1.5 MG/DL (ref 2.5–4.9)
PHOSPHATE SERPL-MCNC: 1.6 MG/DL (ref 2.5–4.9)
PHOSPHATE SERPL-MCNC: 1.9 MG/DL (ref 2.5–4.9)
PHOSPHATE SERPL-MCNC: 2.1 MG/DL (ref 2.5–4.9)
PHOSPHATE SERPL-MCNC: 2.7 MG/DL (ref 2.5–4.9)
PLATELET # BLD AUTO: 251 X10*3/UL (ref 150–450)
PO2 BLDV: 63 MM HG (ref 35–45)
POTASSIUM BLDV-SCNC: 4.4 MMOL/L (ref 3.5–5.3)
POTASSIUM SERPL-SCNC: 3.4 MMOL/L (ref 3.5–5.3)
POTASSIUM SERPL-SCNC: 3.7 MMOL/L (ref 3.5–5.3)
POTASSIUM SERPL-SCNC: 3.8 MMOL/L (ref 3.5–5.3)
POTASSIUM SERPL-SCNC: 3.9 MMOL/L (ref 3.5–5.3)
POTASSIUM SERPL-SCNC: 4.2 MMOL/L (ref 3.5–5.3)
POTASSIUM SERPL-SCNC: 4.2 MMOL/L (ref 3.5–5.3)
PR INTERVAL: 124 MS
PR INTERVAL: 136 MS
Q ONSET: 219 MS
Q ONSET: 222 MS
QRS COUNT: 14 BEATS
QRS COUNT: 15 BEATS
QRS DURATION: 90 MS
QRS DURATION: 94 MS
QT INTERVAL: 470 MS
QT INTERVAL: 472 MS
QTC CALCULATION(BAZETT): 544 MS
QTC CALCULATION(BAZETT): 574 MS
QTC FREDERICIA: 519 MS
QTC FREDERICIA: 538 MS
R AXIS: -28 DEGREES
R AXIS: 2 DEGREES
RBC # BLD AUTO: 4.3 X10*6/UL (ref 4–5.2)
RIGHT VENTRICLE PEAK SYSTOLIC PRESSURE: 35.8 MMHG
SAO2 % BLDV: 94 % (ref 45–75)
SODIUM BLDV-SCNC: 131 MMOL/L (ref 136–145)
SODIUM SERPL-SCNC: 132 MMOL/L (ref 136–145)
SODIUM SERPL-SCNC: 136 MMOL/L (ref 136–145)
SODIUM SERPL-SCNC: 137 MMOL/L (ref 136–145)
SODIUM SERPL-SCNC: 137 MMOL/L (ref 136–145)
T AXIS: 101 DEGREES
T AXIS: 87 DEGREES
T OFFSET: 454 MS
T OFFSET: 458 MS
VENTRICULAR RATE: 80 BPM
VENTRICULAR RATE: 90 BPM
WBC # BLD AUTO: 12.3 X10*3/UL (ref 4.4–11.3)

## 2025-02-20 PROCEDURE — 2500000005 HC RX 250 GENERAL PHARMACY W/O HCPCS

## 2025-02-20 PROCEDURE — 2500000002 HC RX 250 W HCPCS SELF ADMINISTERED DRUGS (ALT 637 FOR MEDICARE OP, ALT 636 FOR OP/ED)

## 2025-02-20 PROCEDURE — 82330 ASSAY OF CALCIUM: CPT

## 2025-02-20 PROCEDURE — C1769 GUIDE WIRE: HCPCS | Performed by: INTERNAL MEDICINE

## 2025-02-20 PROCEDURE — 84132 ASSAY OF SERUM POTASSIUM: CPT

## 2025-02-20 PROCEDURE — 93010 ELECTROCARDIOGRAM REPORT: CPT | Performed by: INTERNAL MEDICINE

## 2025-02-20 PROCEDURE — 99291 CRITICAL CARE FIRST HOUR: CPT

## 2025-02-20 PROCEDURE — 93005 ELECTROCARDIOGRAM TRACING: CPT

## 2025-02-20 PROCEDURE — B2151ZZ FLUOROSCOPY OF LEFT HEART USING LOW OSMOLAR CONTRAST: ICD-10-PCS | Performed by: INTERNAL MEDICINE

## 2025-02-20 PROCEDURE — 2500000001 HC RX 250 WO HCPCS SELF ADMINISTERED DRUGS (ALT 637 FOR MEDICARE OP)

## 2025-02-20 PROCEDURE — C1760 CLOSURE DEV, VASC: HCPCS | Performed by: INTERNAL MEDICINE

## 2025-02-20 PROCEDURE — 2500000004 HC RX 250 GENERAL PHARMACY W/ HCPCS (ALT 636 FOR OP/ED)

## 2025-02-20 PROCEDURE — 2500000005 HC RX 250 GENERAL PHARMACY W/O HCPCS: Performed by: INTERNAL MEDICINE

## 2025-02-20 PROCEDURE — 85025 COMPLETE CBC W/AUTO DIFF WBC: CPT

## 2025-02-20 PROCEDURE — 80069 RENAL FUNCTION PANEL: CPT

## 2025-02-20 PROCEDURE — 84484 ASSAY OF TROPONIN QUANT: CPT

## 2025-02-20 PROCEDURE — 85730 THROMBOPLASTIN TIME PARTIAL: CPT

## 2025-02-20 PROCEDURE — 3E033XZ INTRODUCTION OF VASOPRESSOR INTO PERIPHERAL VEIN, PERCUTANEOUS APPROACH: ICD-10-PCS | Performed by: INTERNAL MEDICINE

## 2025-02-20 PROCEDURE — 93458 L HRT ARTERY/VENTRICLE ANGIO: CPT | Performed by: INTERNAL MEDICINE

## 2025-02-20 PROCEDURE — 99152 MOD SED SAME PHYS/QHP 5/>YRS: CPT | Performed by: INTERNAL MEDICINE

## 2025-02-20 PROCEDURE — 2020000001 HC ICU ROOM DAILY

## 2025-02-20 PROCEDURE — 2550000001 HC RX 255 CONTRASTS: Performed by: INTERNAL MEDICINE

## 2025-02-20 PROCEDURE — C1894 INTRO/SHEATH, NON-LASER: HCPCS | Performed by: INTERNAL MEDICINE

## 2025-02-20 PROCEDURE — 36415 COLL VENOUS BLD VENIPUNCTURE: CPT

## 2025-02-20 PROCEDURE — 2720000007 HC OR 272 NO HCPCS: Performed by: INTERNAL MEDICINE

## 2025-02-20 PROCEDURE — 99232 SBSQ HOSP IP/OBS MODERATE 35: CPT | Performed by: INTERNAL MEDICINE

## 2025-02-20 PROCEDURE — 2500000004 HC RX 250 GENERAL PHARMACY W/ HCPCS (ALT 636 FOR OP/ED): Performed by: INTERNAL MEDICINE

## 2025-02-20 PROCEDURE — B2111ZZ FLUOROSCOPY OF MULTIPLE CORONARY ARTERIES USING LOW OSMOLAR CONTRAST: ICD-10-PCS | Performed by: INTERNAL MEDICINE

## 2025-02-20 PROCEDURE — 4A023N7 MEASUREMENT OF CARDIAC SAMPLING AND PRESSURE, LEFT HEART, PERCUTANEOUS APPROACH: ICD-10-PCS | Performed by: INTERNAL MEDICINE

## 2025-02-20 PROCEDURE — 83735 ASSAY OF MAGNESIUM: CPT

## 2025-02-20 PROCEDURE — 85347 COAGULATION TIME ACTIVATED: CPT

## 2025-02-20 PROCEDURE — 82435 ASSAY OF BLOOD CHLORIDE: CPT

## 2025-02-20 PROCEDURE — 82010 KETONE BODYS QUAN: CPT

## 2025-02-20 PROCEDURE — 83605 ASSAY OF LACTIC ACID: CPT

## 2025-02-20 PROCEDURE — 82947 ASSAY GLUCOSE BLOOD QUANT: CPT

## 2025-02-20 RX ORDER — MIDAZOLAM HYDROCHLORIDE 1 MG/ML
INJECTION, SOLUTION INTRAMUSCULAR; INTRAVENOUS AS NEEDED
Status: DISCONTINUED | OUTPATIENT
Start: 2025-02-20 | End: 2025-02-20 | Stop reason: HOSPADM

## 2025-02-20 RX ORDER — DEXTROSE MONOHYDRATE 100 MG/ML
150 INJECTION, SOLUTION INTRAVENOUS CONTINUOUS PRN
Status: DISCONTINUED | OUTPATIENT
Start: 2025-02-20 | End: 2025-02-21

## 2025-02-20 RX ORDER — POTASSIUM CHLORIDE 20 MEQ/1
40 TABLET, EXTENDED RELEASE ORAL ONCE
Status: COMPLETED | OUTPATIENT
Start: 2025-02-20 | End: 2025-02-20

## 2025-02-20 RX ORDER — DEXTROSE MONOHYDRATE 100 MG/ML
75 INJECTION, SOLUTION INTRAVENOUS CONTINUOUS PRN
Status: DISCONTINUED | OUTPATIENT
Start: 2025-02-20 | End: 2025-02-20

## 2025-02-20 RX ORDER — DEXTROSE MONOHYDRATE 100 MG/ML
150 INJECTION, SOLUTION INTRAVENOUS CONTINUOUS PRN
Status: DISCONTINUED | OUTPATIENT
Start: 2025-02-20 | End: 2025-02-20

## 2025-02-20 RX ORDER — NOREPINEPHRINE BITARTRATE/D5W 8 MG/250ML
0-.5 PLASTIC BAG, INJECTION (ML) INTRAVENOUS CONTINUOUS
Status: DISCONTINUED | OUTPATIENT
Start: 2025-02-20 | End: 2025-02-22

## 2025-02-20 RX ORDER — MAGNESIUM SULFATE HEPTAHYDRATE 40 MG/ML
2 INJECTION, SOLUTION INTRAVENOUS ONCE
Status: COMPLETED | OUTPATIENT
Start: 2025-02-20 | End: 2025-02-20

## 2025-02-20 RX ORDER — INSULIN GLARGINE 100 [IU]/ML
15 INJECTION, SOLUTION SUBCUTANEOUS EVERY 24 HOURS
Status: DISCONTINUED | OUTPATIENT
Start: 2025-02-21 | End: 2025-02-21

## 2025-02-20 RX ORDER — DEXTROSE 50 % IN WATER (D50W) INTRAVENOUS SYRINGE
50
Status: DISCONTINUED | OUTPATIENT
Start: 2025-02-20 | End: 2025-02-20

## 2025-02-20 RX ORDER — DEXTROSE 50 % IN WATER (D50W) INTRAVENOUS SYRINGE
50
Status: DISCONTINUED | OUTPATIENT
Start: 2025-02-20 | End: 2025-02-21

## 2025-02-20 RX ORDER — DEXTROSE MONOHYDRATE AND SODIUM CHLORIDE 5; .45 G/100ML; G/100ML
150 INJECTION, SOLUTION INTRAVENOUS CONTINUOUS PRN
Status: DISCONTINUED | OUTPATIENT
Start: 2025-02-20 | End: 2025-02-20

## 2025-02-20 RX ORDER — MAGNESIUM SULFATE 1 G/100ML
1 INJECTION INTRAVENOUS ONCE
Status: COMPLETED | OUTPATIENT
Start: 2025-02-20 | End: 2025-02-20

## 2025-02-20 RX ORDER — DEXTROSE MONOHYDRATE AND SODIUM CHLORIDE 5; .45 G/100ML; G/100ML
150 INJECTION, SOLUTION INTRAVENOUS CONTINUOUS PRN
Status: DISCONTINUED | OUTPATIENT
Start: 2025-02-20 | End: 2025-02-21

## 2025-02-20 RX ORDER — SODIUM CHLORIDE 450 MG/100ML
250 INJECTION, SOLUTION INTRAVENOUS CONTINUOUS
Status: DISCONTINUED | OUTPATIENT
Start: 2025-02-20 | End: 2025-02-21

## 2025-02-20 RX ORDER — LIDOCAINE HYDROCHLORIDE 10 MG/ML
INJECTION, SOLUTION EPIDURAL; INFILTRATION; INTRACAUDAL; PERINEURAL AS NEEDED
Status: DISCONTINUED | OUTPATIENT
Start: 2025-02-20 | End: 2025-02-20 | Stop reason: HOSPADM

## 2025-02-20 RX ORDER — DOBUTAMINE HYDROCHLORIDE 400 MG/100ML
5 INJECTION INTRAVENOUS CONTINUOUS
Status: DISCONTINUED | OUTPATIENT
Start: 2025-02-20 | End: 2025-02-20

## 2025-02-20 RX ORDER — IODIXANOL 320 MG/ML
INJECTION, SOLUTION INTRAVASCULAR AS NEEDED
Status: DISCONTINUED | OUTPATIENT
Start: 2025-02-20 | End: 2025-02-20 | Stop reason: HOSPADM

## 2025-02-20 RX ADMIN — POTASSIUM CHLORIDE 40 MEQ: 1500 TABLET, EXTENDED RELEASE ORAL at 18:43

## 2025-02-20 RX ADMIN — DEXTROSE MONOHYDRATE 150 ML/HR: 10 INJECTION, SOLUTION INTRAVENOUS at 21:04

## 2025-02-20 RX ADMIN — INSULIN GLARGINE 15 UNITS: 100 INJECTION, SOLUTION SUBCUTANEOUS at 23:50

## 2025-02-20 RX ADMIN — DEXTROSE MONOHYDRATE 150 ML/HR: 10 INJECTION, SOLUTION INTRAVENOUS at 17:27

## 2025-02-20 RX ADMIN — INSULIN HUMAN 5.4 UNITS/HR: 1 INJECTION, SOLUTION INTRAVENOUS at 09:12

## 2025-02-20 RX ADMIN — DEXTROSE MONOHYDRATE AND SODIUM CHLORIDE 150 ML/HR: 5; .45 INJECTION, SOLUTION INTRAVENOUS at 08:26

## 2025-02-20 RX ADMIN — PAROXETINE HYDROCHLORIDE 60 MG: 20 TABLET, FILM COATED ORAL at 08:58

## 2025-02-20 RX ADMIN — ASPIRIN 81 MG CHEWABLE TABLET 81 MG: 81 TABLET CHEWABLE at 08:58

## 2025-02-20 RX ADMIN — NOREPINEPHRINE BITARTRATE 0.01 MCG/KG/MIN: 8 INJECTION, SOLUTION INTRAVENOUS at 00:12

## 2025-02-20 RX ADMIN — INSULIN HUMAN 2 UNITS/HR: 1 INJECTION, SOLUTION INTRAVENOUS at 21:05

## 2025-02-20 RX ADMIN — TRAZODONE HYDROCHLORIDE 200 MG: 100 TABLET ORAL at 20:13

## 2025-02-20 RX ADMIN — DEXTROSE MONOHYDRATE AND SODIUM CHLORIDE 150 ML/HR: 5; .45 INJECTION, SOLUTION INTRAVENOUS at 02:11

## 2025-02-20 RX ADMIN — DOBUTAMINE HYDROCHLORIDE 2.5 MCG/KG/MIN: 400 INJECTION INTRAVENOUS at 03:54

## 2025-02-20 RX ADMIN — DEXTROSE MONOHYDRATE 150 ML/HR: 10 INJECTION, SOLUTION INTRAVENOUS at 18:44

## 2025-02-20 RX ADMIN — NOREPINEPHRINE BITARTRATE 0.08 MCG/KG/MIN: 8 INJECTION, SOLUTION INTRAVENOUS at 21:07

## 2025-02-20 RX ADMIN — SODIUM CHLORIDE 500 ML: 900 INJECTION, SOLUTION INTRAVENOUS at 16:10

## 2025-02-20 RX ADMIN — POTASSIUM PHOSPHATE, MONOBASIC AND POTASSIUM PHOSPHATE, DIBASIC 21 MMOL: 224; 236 INJECTION, SOLUTION, CONCENTRATE INTRAVENOUS at 23:51

## 2025-02-20 RX ADMIN — DEXTROSE MONOHYDRATE 150 ML/HR: 10 INJECTION, SOLUTION INTRAVENOUS at 01:26

## 2025-02-20 RX ADMIN — SODIUM PHOSPHATE, MONOBASIC, MONOHYDRATE AND SODIUM PHOSPHATE, DIBASIC, ANHYDROUS 15 MMOL: 276; 142 INJECTION, SOLUTION INTRAVENOUS at 00:04

## 2025-02-20 RX ADMIN — MAGNESIUM SULFATE HEPTAHYDRATE 1 G: 1 INJECTION, SOLUTION INTRAVENOUS at 20:15

## 2025-02-20 RX ADMIN — MAGNESIUM SULFATE IN WATER FOR 2 G: 40 INJECTION INTRAVENOUS at 16:04

## 2025-02-20 RX ADMIN — DEXTROSE MONOHYDRATE 150 ML/HR: 10 INJECTION, SOLUTION INTRAVENOUS at 01:30

## 2025-02-20 SDOH — ECONOMIC STABILITY: HOUSING INSECURITY: IN THE LAST 12 MONTHS, WAS THERE A TIME WHEN YOU WERE NOT ABLE TO PAY THE MORTGAGE OR RENT ON TIME?: NO

## 2025-02-20 SDOH — HEALTH STABILITY: PHYSICAL HEALTH
HOW OFTEN DO YOU NEED TO HAVE SOMEONE HELP YOU WHEN YOU READ INSTRUCTIONS, PAMPHLETS, OR OTHER WRITTEN MATERIAL FROM YOUR DOCTOR OR PHARMACY?: NEVER

## 2025-02-20 SDOH — HEALTH STABILITY: PHYSICAL HEALTH: ON AVERAGE, HOW MANY DAYS PER WEEK DO YOU ENGAGE IN MODERATE TO STRENUOUS EXERCISE (LIKE A BRISK WALK)?: 5 DAYS

## 2025-02-20 SDOH — ECONOMIC STABILITY: FOOD INSECURITY: WITHIN THE PAST 12 MONTHS, YOU WORRIED THAT YOUR FOOD WOULD RUN OUT BEFORE YOU GOT THE MONEY TO BUY MORE.: NEVER TRUE

## 2025-02-20 SDOH — SOCIAL STABILITY: SOCIAL NETWORK
DO YOU BELONG TO ANY CLUBS OR ORGANIZATIONS SUCH AS CHURCH GROUPS, UNIONS, FRATERNAL OR ATHLETIC GROUPS, OR SCHOOL GROUPS?: NO

## 2025-02-20 SDOH — HEALTH STABILITY: MENTAL HEALTH: HOW MANY DRINKS CONTAINING ALCOHOL DO YOU HAVE ON A TYPICAL DAY WHEN YOU ARE DRINKING?: 1 OR 2

## 2025-02-20 SDOH — ECONOMIC STABILITY: TRANSPORTATION INSECURITY: IN THE PAST 12 MONTHS, HAS LACK OF TRANSPORTATION KEPT YOU FROM MEDICAL APPOINTMENTS OR FROM GETTING MEDICATIONS?: NO

## 2025-02-20 SDOH — ECONOMIC STABILITY: FOOD INSECURITY: HOW HARD IS IT FOR YOU TO PAY FOR THE VERY BASICS LIKE FOOD, HOUSING, MEDICAL CARE, AND HEATING?: NOT VERY HARD

## 2025-02-20 SDOH — SOCIAL STABILITY: SOCIAL INSECURITY: WITHIN THE LAST YEAR, HAVE YOU BEEN AFRAID OF YOUR PARTNER OR EX-PARTNER?: NO

## 2025-02-20 SDOH — SOCIAL STABILITY: SOCIAL NETWORK: HOW OFTEN DO YOU GET TOGETHER WITH FRIENDS OR RELATIVES?: MORE THAN THREE TIMES A WEEK

## 2025-02-20 SDOH — ECONOMIC STABILITY: INCOME INSECURITY: IN THE PAST 12 MONTHS HAS THE ELECTRIC, GAS, OIL, OR WATER COMPANY THREATENED TO SHUT OFF SERVICES IN YOUR HOME?: NO

## 2025-02-20 SDOH — ECONOMIC STABILITY: HOUSING INSECURITY: AT ANY TIME IN THE PAST 12 MONTHS, WERE YOU HOMELESS OR LIVING IN A SHELTER (INCLUDING NOW)?: NO

## 2025-02-20 SDOH — HEALTH STABILITY: MENTAL HEALTH: HOW OFTEN DO YOU HAVE SIX OR MORE DRINKS ON ONE OCCASION?: NEVER

## 2025-02-20 SDOH — HEALTH STABILITY: MENTAL HEALTH
DO YOU FEEL STRESS - TENSE, RESTLESS, NERVOUS, OR ANXIOUS, OR UNABLE TO SLEEP AT NIGHT BECAUSE YOUR MIND IS TROUBLED ALL THE TIME - THESE DAYS?: NOT AT ALL

## 2025-02-20 SDOH — SOCIAL STABILITY: SOCIAL INSECURITY: WITHIN THE LAST YEAR, HAVE YOU BEEN HUMILIATED OR EMOTIONALLY ABUSED IN OTHER WAYS BY YOUR PARTNER OR EX-PARTNER?: NO

## 2025-02-20 SDOH — SOCIAL STABILITY: SOCIAL INSECURITY
WITHIN THE LAST YEAR, HAVE YOU BEEN RAPED OR FORCED TO HAVE ANY KIND OF SEXUAL ACTIVITY BY YOUR PARTNER OR EX-PARTNER?: NO

## 2025-02-20 SDOH — SOCIAL STABILITY: SOCIAL INSECURITY: ARE YOU MARRIED, WIDOWED, DIVORCED, SEPARATED, NEVER MARRIED, OR LIVING WITH A PARTNER?: WIDOWED

## 2025-02-20 SDOH — SOCIAL STABILITY: SOCIAL NETWORK
IN A TYPICAL WEEK, HOW MANY TIMES DO YOU TALK ON THE PHONE WITH FAMILY, FRIENDS, OR NEIGHBORS?: MORE THAN THREE TIMES A WEEK

## 2025-02-20 SDOH — SOCIAL STABILITY: SOCIAL INSECURITY
WITHIN THE LAST YEAR, HAVE YOU BEEN KICKED, HIT, SLAPPED, OR OTHERWISE PHYSICALLY HURT BY YOUR PARTNER OR EX-PARTNER?: NO

## 2025-02-20 SDOH — HEALTH STABILITY: PHYSICAL HEALTH: ON AVERAGE, HOW MANY MINUTES DO YOU ENGAGE IN EXERCISE AT THIS LEVEL?: 20 MIN

## 2025-02-20 SDOH — ECONOMIC STABILITY: FOOD INSECURITY: WITHIN THE PAST 12 MONTHS, THE FOOD YOU BOUGHT JUST DIDN'T LAST AND YOU DIDN'T HAVE MONEY TO GET MORE.: NEVER TRUE

## 2025-02-20 SDOH — SOCIAL STABILITY: SOCIAL NETWORK: HOW OFTEN DO YOU ATTEND MEETINGS OF THE CLUBS OR ORGANIZATIONS YOU BELONG TO?: NEVER

## 2025-02-20 SDOH — SOCIAL STABILITY: SOCIAL NETWORK: HOW OFTEN DO YOU ATTEND CHURCH OR RELIGIOUS SERVICES?: MORE THAN 4 TIMES PER YEAR

## 2025-02-20 SDOH — HEALTH STABILITY: MENTAL HEALTH: HOW OFTEN DO YOU HAVE A DRINK CONTAINING ALCOHOL?: MONTHLY OR LESS

## 2025-02-20 SDOH — ECONOMIC STABILITY: HOUSING INSECURITY: IN THE PAST 12 MONTHS, HOW MANY TIMES HAVE YOU MOVED WHERE YOU WERE LIVING?: 1

## 2025-02-20 ASSESSMENT — PAIN SCALES - GENERAL
PAINLEVEL_OUTOF10: 0 - NO PAIN

## 2025-02-20 ASSESSMENT — ACTIVITIES OF DAILY LIVING (ADL)
LACK_OF_TRANSPORTATION: NO
LACK_OF_TRANSPORTATION: PATIENT DECLINED

## 2025-02-20 ASSESSMENT — LIFESTYLE VARIABLES
SKIP TO QUESTIONS 9-10: 1
AUDIT-C TOTAL SCORE: 1

## 2025-02-20 ASSESSMENT — PAIN - FUNCTIONAL ASSESSMENT
PAIN_FUNCTIONAL_ASSESSMENT: 0-10

## 2025-02-20 NOTE — NURSING NOTE
Attempted to remove 3cc's of air from TR band, site started to bleed. 3 ml's added back, will attempt removal once pt has finished eating

## 2025-02-20 NOTE — PROGRESS NOTES
Critical Care Medicine       Date:  2/20/2025  Patient:  Desiree Shepard  YOB: 1960  MRN:  [id number]   Admit Date:  2/18/2025      Chief Complaint   Patient presents with    Chest Pain     Patient is currently positive for Flu A and is now experiencing chest pain that started about 2 days ago.  States it as a haed pain that spreads across her chest. Sent from urgent care for further eval         History of Present Illness:  Desiree Shepard is a 64 year old female with pmhx of recurrent major depressive disorder, DM, mixed hyperlipidemia, anxiety, and lumbar spondylosis. Patient was admitted on 2/18 with new onset chest pain, crushing in nature. Patient also complaining of malaise that has present for atleast a month. Patient denies radiation, n/v, syncope, chest palpitations or sob. Patients labs in ED concerning for hyperglycemia of 517, elevated troponin of 382 and repeat of 954, elevated beta hydroxybutyrate of 3.06 hba1c of 11.2 and positive flu a. 12 lead on admission was negative for st elevation. Patient at this point was admitted to step down with plan of left heart cath on 2/19 was started on a heparin gtt.     Am labs 2/19 showing worsening dka with elevated beta hydroxybutyrate of 11.14 glucose of 302, and vbg showing metabolic acidosis ph of 7.19 co2 of 20 bicarb of 7.6 and lactate of 1.6. Patient was transferred to ICU as cardiology wanted stabilization of patient before performing left heart cath. Follow up LHC planned for 2/20.      Interval ICU Events:  2/20: Currently on insulin gtt for DKA. On dobutamine @ 5 and levo at 0.1. On heparing gtt. Cardiology following for possible LHC today.     Objective     Past Medical History:   Diagnosis Date    Acid reflux     Age-related nuclear cataract of both eyes 6/13/2024    Diabetes mellitus (Multi)     Herpes     Hiatal hernia     High cholesterol     History of HPV infection     IBS (irritable bowel syndrome)     Insomnia     Rosacea      Unspecified disorder of refraction 6/13/2024     Past Surgical History:   Procedure Laterality Date    APPENDECTOMY  2005    COLONOSCOPY  2015    TORTUOUS COLON- REPEAT IN 5 YEARS--DR. HERNANDES    HERNIA REPAIR  01/2018    TUBAL LIGATION Bilateral 11/1991     Medications Prior to Admission   Medication Sig Dispense Refill Last Dose/Taking    chlorpheniramine/dextromethorp (CORICIDIN HBP COUGH AND COLD ORAL) Take 1 each by mouth once daily as needed.   2/18/2025    cholecalciferol (Vitamin D-3) 25 MCG (1000 UT) capsule Take 1 capsule (25 mcg) by mouth once every 24 hours.   2/18/2025    cyanocobalamin (Vitamin B-12) 1,000 mcg tablet Take 1 tablet (1,000 mcg) by mouth once daily.   2/18/2025    empagliflozin (Jardiance) 25 mg Take 1 tablet (25 mg) by mouth once daily. 90 tablet 3 2/18/2025    hydrOXYzine HCL (Atarax) 25 mg tablet TAKE TWO TABLETS BY MOUTH EVERY 8 HOURS FOR ANXIETY 270 tablet 3 2/18/2025    MILK THISTLE ORAL Take 1 each by mouth once daily.   2/18/2025    multivitamin tablet Take 1 tablet by mouth once daily.   2/18/2025    PARoxetine (Paxil) 40 mg tablet Take 1.5 tablets (60 mg) by mouth once daily. 135 tablet 3 2/18/2025    pravastatin (Pravachol) 20 mg tablet Take 1 tablet (20 mg) by mouth once daily. 90 tablet 3 2/18/2025    traZODone (Desyrel) 100 mg tablet TAKE TWO TABLETS BY MOUTH EVERY  tablet 3 2/17/2025    Trulicity 4.5 mg/0.5 mL pen injector INJECT 4.5MG UNDER THE SKIN ONCE WEEKLY 2 mL 3 2/17/2025    TURMERIC ORAL Take 1 each by mouth once daily.   2/18/2025    benzonatate (Tessalon) 200 mg capsule Take 1 capsule (200 mg) by mouth 3 times a day as needed for cough for up to 7 days. Do not crush or chew. (Patient not taking: Reported on 2/18/2025) 30 capsule 0 Not Taking    blood-glu meter,cont-transmit misc as directed   Unknown    blood-glucose sensor (Dexcom G7 Sensor) device Change every 10 DAYS 3 each 11 Unknown    guaiFENesin (Mucinex) 1,200 mg tablet extended release 12hr Take 1  tablet (1,200 mg) by mouth every 12 hours if needed (cough) for up to 7 days. (Patient not taking: Reported on 2/18/2025) 14 tablet 0 Not Taking    zoster vaccine-recombinant adjuvanted (Shingrix) 50 mcg/0.5 mL vaccine Inject 0.5 mL into the muscle every 8 (eight) weeks. (Patient not taking: Reported on 9/5/2024) 1 mL 0      Penicillin, Statins-hmg-coa reductase inhibitors, and Sulfamethoxazole-trimethoprim  Social History     Tobacco Use    Smoking status: Never     Passive exposure: Never    Smokeless tobacco: Never   Vaping Use    Vaping status: Never Used   Substance Use Topics    Alcohol use: Not Currently    Drug use: Never     Family History   Problem Relation Name Age of Onset    Ulcerative colitis Mother      Throat cancer Father      Aneurysm Father          under his belly button - inherited    Leukemia Father      Other (abdominal aortic aneurysm) Father      Diabetes Father      Hypertension Father      Lupus Sister      Other (factor v) Sister      Blood clot Sister      Cancer Sister      Heart attack Brother  40    Testicular cancer Brother      Deep vein thrombosis Brother      Heart disease Brother      Hypertension Brother      Lupus Daughter      Ulcerative colitis Daughter      Obesity Daughter      Cancer Daughter      No Known Problems Daughter      No Known Problems Daughter      Other (abdominal aortic aneurysm) Paternal Grandfather      Lupus Half-Sister         [facility] Medications:    dextrose 10 % in water (D10W), 150 mL/hr, Last Rate: Stopped (02/20/25 0211)  dextrose 10 % in water (D10W), 150 mL/hr, Last Rate: Stopped (02/20/25 0128)  dextrose 5%-0.45 % sodium chloride, 150 mL/hr, Last Rate: 150 mL/hr (02/20/25 0455)  DOBUTamine, 5 mcg/kg/min, Last Rate: 5 mcg/kg/min (02/20/25 0526)  heparin, 0-4,000 Units/hr, Last Rate: 600 Units/hr (02/20/25 0455)  insulin regular, 0-50 Units/hr, Last Rate: 1 Units/hr (02/20/25 0600)  norepinephrine, 0-0.5 mcg/kg/min, Last Rate: 0.1 mcg/kg/min  (02/20/25 0455)          Current Facility-Administered Medications:     acetaminophen (Tylenol) tablet 650 mg, 650 mg, oral, q6h PRN, Florian R Kiss, APRN-CNP    aspirin chewable tablet 81 mg, 81 mg, oral, Daily, Florian R Kiss, APRN-CNP, 81 mg at 02/19/25 0851    dextrose 10 % in water (D10W) infusion, 150 mL/hr, intravenous, Continuous PRN, Jose E Hipps, PA-C, Stopped at 02/20/25 0211    dextrose 10 % in water (D10W) infusion, 150 mL/hr, intravenous, Continuous PRN, Jose E Hipps, PA-C, Stopped at 02/20/25 0128    dextrose 5%-0.45 % sodium chloride infusion, 150 mL/hr, intravenous, Continuous PRN, Jose E Hipps, PA-C, Last Rate: 150 mL/hr at 02/20/25 0455, 150 mL/hr at 02/20/25 0455    dextrose 50 % injection 12.5 g, 12.5 g, intravenous, q15 min PRN, Florian R Kiss, APRN-CNP    dextrose 50 % injection 25 g, 25 g, intravenous, q15 min PRN, Florian R Kiss, APRN-CNP    dextrose 50 % injection 50 mL, 50 mL, intravenous, q15 min PRN, Florian R Kiss, APRN-CNP    dextrose 50 % injection 50 mL, 50 mL, intravenous, q15 min PRN, Jose E Hipps, PA-C    DOBUTamine (Dobutrex) 1,000 mg in dextrose 5% 250 mL (4 mg/mL) infusion (premix), 5 mcg/kg/min, intravenous, Continuous, Jose E Hipps, PA-C, Last Rate: 4.05 mL/hr at 02/20/25 0526, 5 mcg/kg/min at 02/20/25 0526    [Held by provider] empagliflozin (Jardiance) tablet 25 mg, 25 mg, oral, Daily, Florian R Kiss, APRN-CNP    glucagon (Glucagen) injection 1 mg, 1 mg, intramuscular, q15 min PRN, Florian R Kiss, APRN-CNP    heparin (porcine) injection 1,500-3,000 Units, 1,500-3,000 Units, intravenous, PRN, Florian R Kiss, APRN-CNP    heparin 25,000 Units in dextrose 5% 250 mL (100 Units/mL) infusion (premix), 0-4,000 Units/hr, intravenous, Continuous, Flroian R Kiss APRN-CNP, Last Rate: 6 mL/hr at 02/20/25 0455, 600 Units/hr at 02/20/25 0455    hydrOXYzine HCL (Atarax) tablet 25 mg, 25 mg, oral, q8h PRN, Florian R Kiss, APRN-CNP    insulin regular (HumuLIN, NovoLIN) bolus from bag 5  Units, 0.1 Units/kg, intravenous, PRN, Florian Pleitez, APRN-CNP    insulin regular 100 unit/100 mL (1 unit/mL) in 0.9 % NaCl infusion, 0-50 Units/hr, intravenous, Continuous, Florian Pleitez, APRN-CNP, Last Rate: 1 mL/hr at 02/20/25 0600, 1 Units/hr at 02/20/25 0600    ipratropium-albuteroL (Duo-Neb) 0.5-2.5 mg/3 mL nebulizer solution 3 mL, 3 mL, nebulization, q6h PRN, Florian Pleitez, APRN-CNP    morphine injection 2 mg, 2 mg, intravenous, q4h PRN, Florian Pleitez, APRN-CNP    norepinephrine (Levophed) 8 mg in dextrose 5% 250 mL (0.032 mg/mL) infusion (premix), 0-0.5 mcg/kg/min, intravenous, Continuous, Jose Amos PA-C, Last Rate: 10.13 mL/hr at 02/20/25 0455, 0.1 mcg/kg/min at 02/20/25 0455    pantoprazole (ProtoNix) EC tablet 40 mg, 40 mg, oral, Daily before breakfast, Florian Pleitez, APRN-CNP, 40 mg at 02/19/25 0626    PARoxetine (Paxil) tablet 60 mg, 60 mg, oral, Daily, Florian Pleitez, APRN-CNP    polyethylene glycol (Glycolax, Miralax) packet 17 g, 17 g, oral, Daily PRN, Florian Pleitez, APRN-CNP    prochlorperazine (Compazine) injection 5 mg, 5 mg, intravenous, q6h PRN, Florian Pleitez, APRN-CNP    traZODone (Desyrel) tablet 200 mg, 200 mg, oral, Nightly, Florian Pleitez, APRN-CNP, 200 mg at 02/19/25 2039    Physical Exam:    Heart Rate:  [67-93]   Temp:  [36.2 °C (97.2 °F)-36.9 °C (98.4 °F)]   Resp:  [14-22]   BP: (70-96)/(46-59)   Weight:  [54 kg (119 lb 0.8 oz)-55.1 kg (121 lb 7.6 oz)]   SpO2:  [94 %-100 %]     Physical Exam  Constitutional:       Appearance: Normal appearance.   HENT:      Head: Normocephalic and atraumatic.      Mouth/Throat:      Mouth: Mucous membranes are moist.   Eyes:      Pupils: Pupils are equal, round, and reactive to light.   Cardiovascular:      Rate and Rhythm: Normal rate and regular rhythm.      Pulses: Normal pulses.      Heart sounds: Normal heart sounds.   Pulmonary:      Effort: Pulmonary effort is normal.      Breath sounds: Normal breath sounds.   Abdominal:      General:  Abdomen is flat. There is no distension.      Palpations: Abdomen is soft.      Tenderness: There is no abdominal tenderness.   Musculoskeletal:         General: No swelling.   Skin:     General: Skin is warm.      Capillary Refill: Capillary refill takes less than 2 seconds.   Neurological:      Mental Status: She is alert.   Psychiatric:         Mood and Affect: Mood normal.         Review of Systems:  14 point review of systems was completed and negative except for those specially mention in my HPI    I have reviewed all medications, laboratory results, and imaging pertinent for today's encounter.           Intake/Output Summary (Last 24 hours) at 2/20/2025 0805  Last data filed at 2/20/2025 0455  Gross per 24 hour   Intake 3033.37 ml   Output 350 ml   Net 2683.37 ml            Assessment/Plan:    I am currently managing this critically ill patient for the following problems:    Neuro/Psych/Pain Ctrl/Sedation:  Acute pain/Chest pain  Major Depressive disorder/Anxiety  Lumbar Spondylosis   - CAM ICU score q-shift  - Sleep/wake cycle hygiene  - Delirium precaution   - Pain Management: Tylenol, Morphine for chest pain  - Q4 Neuro assessments  - Continue home trazodone at night and daily paxil     Respiratory/ENT:  Positive flu a  CXR 2/19: Perihilar infiltrates bilaterally, R>L,  - Continuous Pulse oximetry  - Wean Fio2 to maintain Spo2>92%  - Bronchial hygiene and IS  - Stopped tamiflu as patient was outside of treatment window based on her reported time of initial symptoms      Cardiovascular:  NSTEMI  Hyperlipidemia   No previous echo on file  - Continuous Telemetry  - Systolic goals >90 and < 160  - 12 leads prn for any new cp  - Echo : EF 30-35%  - [facility] rescheduled for 2/20  - Cardiology following will appreciate recs  -- [facility] 2/20  - Heparin gtt in setting of NSTEMI, titrate based on aptt  - Trop 900 -> 1700     Renal/Volume Status (Intra & Extravascular):  Elevated anion gap metabolic acidosis   - Maintain  urine output 0.5-1.0cc/kg/hr  - IVF per dka protocol   - Monitor I/O's  - Replete electrolytes to maintain K >4.0 and Mg >2.0  - Q4 RFP and mags  - I/O: +2.6L      GI:  - Diet: npo ice chips and sips with meds  - BR: miralax prn     Infectious Disease:  Influenza A positive  - Daily CBC  - WBC: 12.3     Heme/Onc:  - DVT Prophylaxis: SCDs and heparin gtt  - Transfuse for hemoglobin < 7     Endocrine:  Diabetic ketoacidosis w/out coma  DM  - POCT Q1 while on insulin gtt  - HGB A1C 11.2  - TSH normal     OBGYN/Ortho:  - PT/OT when stable     Ethics/Code Status:  Full Code     Lab Draw Frequency:  Q4 rfps     :  DVT Prophylaxis: SCDs and heparin gtt  GI Prophylaxis: None  Bowel Regimen: Miralax prn  Diet: NPO with ice chips  CVC: None  [place]: None  Tate: None  Restraints: None  Dispo: ICU    Critical Care Time:  60 minutes spent in preparing to see patient (I.e.labs,imaging, etc.), documentation, discussion plan of care with patient/family/caregiver, and/ or coordination of care with multidisciplinary team including the attending. Time does not include completion of procedure time.     Amy Flaherty PA-C  Pulmonology & Critical Care Medicine   [facility]

## 2025-02-20 NOTE — PROGRESS NOTES
TCC met with patient. Assessment complete. Patient is a  and lives alone in a house. Patient is independent and works. Patient drives and does her own shopping, cooking and cleaning. Patient reports that she is treated for anxiety. Patient follows Dr. ARACELIS Cheney. Patient fills prescriptions at [facility] in [place]. At the time of discharge, patient will return home with no skilled services. Will follow.      02/20/25 6223   Discharge Planning   Living Arrangements Alone   Support Systems Children;Family members   Type of Residence Private residence   Home or Post Acute Services None   Expected Discharge Disposition Home   Does the patient need discharge transport arranged? No   Financial Resource Strain   How hard is it for you to pay for the very basics like food, housing, medical care, and heating? Not very   Housing Stability   In the last 12 months, was there a time when you were not able to pay the mortgage or rent on time? N   In the past 12 months, how many times have you moved where you were living? 1   At any time in the past 12 months, were you homeless or living in a shelter (including now)? N   Transportation Needs   In the past 12 months, has lack of transportation kept you from medical appointments or from getting medications? no   In the past 12 months, has lack of transportation kept you from meetings, work, or from getting things needed for daily living? No

## 2025-02-20 NOTE — CARE PLAN
Problem: Pain - Adult  Goal: Verbalizes/displays adequate comfort level or baseline comfort level  Outcome: Progressing  Flowsheets (Taken 2/20/2025 0030)  Verbalizes/displays adequate comfort level or baseline comfort level:   Encourage patient to monitor pain and request assistance   Assess pain using appropriate pain scale   Administer analgesics based on type and severity of pain and evaluate response   Implement non-pharmacological measures as appropriate and evaluate response   Consider cultural and social influences on pain and pain management   Notify Licensed Independent Practitioner if interventions unsuccessful or patient reports new pain     Problem: Safety - Adult  Goal: Free from fall injury  Outcome: Progressing  Flowsheets (Taken 2/20/2025 0030)  Free from fall injury:   Instruct family/caregiver on patient safety   Based on caregiver fall risk screen, instruct family/caregiver to ask for assistance with transferring infant if caregiver noted to have fall risk factors     Problem: Discharge Planning  Goal: Discharge to home or other facility with appropriate resources  Outcome: Progressing  Flowsheets (Taken 2/20/2025 0030)  Discharge to home or other facility with appropriate resources:   Identify barriers to discharge with patient and caregiver   Arrange for needed discharge resources and transportation as appropriate   Identify discharge learning needs (meds, wound care, etc)   Arrange for interpreters to assist at discharge as needed   Refer to discharge planning if patient needs post-hospital services based on physician order or complex needs related to functional status, cognitive ability or social support system     Problem: Chronic Conditions and Co-morbidities  Goal: Patient's chronic conditions and co-morbidity symptoms are monitored and maintained or improved  Outcome: Progressing  Flowsheets (Taken 2/20/2025 0030)  Care Plan - Patient's Chronic Conditions and Co-Morbidity Symptoms are  Monitored and Maintained or Improved:   Monitor and assess patient's chronic conditions and comorbid symptoms for stability, deterioration, or improvement   Collaborate with multidisciplinary team to address chronic and comorbid conditions and prevent exacerbation or deterioration   Update acute care plan with appropriate goals if chronic or comorbid symptoms are exacerbated and prevent overall improvement and discharge     Problem: Nutrition  Goal: Nutrient intake appropriate for maintaining nutritional needs  Outcome: Progressing     Problem: Diabetes  Goal: Achieve decreasing blood glucose levels by end of shift  Outcome: Progressing  Flowsheets (Taken 2/20/2025 0030)  Achieve decreasing blood glucose levels by end of shift:   Med administration/monitoring of effect   Self monitor blood glucose with staff oversight  Goal: Increase stability of blood glucose readings by end of shift  Outcome: Progressing  Flowsheets (Taken 2/20/2025 0030)  Increase stability of blood glucose readings by end of shift: Med administration/monitoring of effect  Goal: Decrease in ketones present in urine by end of shift  Outcome: Progressing  Flowsheets (Taken 2/20/2025 0030)  Decrease in ketones present in urine by end of shift:   Med administration/monitoring of effect   Monitor urine output  Goal: Maintain electrolyte levels within acceptable range throughout shift  Outcome: Progressing  Flowsheets (Taken 2/20/2025 0030)  Maintain electrolyte levels within acceptable range throughout shift:   Med administration/monitoring of effect   Monitor urine output  Goal: Maintain glucose levels >70mg/dl to <250mg/dl throughout shift  Outcome: Progressing  Flowsheets (Taken 2/20/2025 0030)  Maintain glucose levels >70mg/dl to <250mg/dl throughout shift:   Med administration/monitoring of effect   Self monitor blood glucose with staff oversight  Goal: No changes in neurological exam by end of shift  Outcome: Progressing  Flowsheets (Taken  2/20/2025 0030)  No changes in neurological exam by end of shift: Complete frequent neurological assessments  Goal: Learn about and adhere to nutrition recommendations by end of shift  Outcome: Progressing  Flowsheets (Taken 2/20/2025 0030)  Learn about and adhere to nutrition recommendations by end of shift: Ensure/encourage compliance with appropriate diet  Goal: Vital signs within normal range for age by end of shift  Outcome: Progressing  Flowsheets (Taken 2/20/2025 0030)  Vital signs within normal range for age by end of shift: Med administration/monitoring of effect  Goal: Increase self care and/or family involovement by end of shift  Outcome: Progressing  Flowsheets (Taken 2/20/2025 0030)  Increase self care and/or family involovement by end of shift: Self monitor blood glucose with staff oversight  Goal: Receive DSME education by end of shift  Outcome: Progressing  Flowsheets (Taken 2/20/2025 0030)  Receive DSME education by end of shift: Provide patient centered education on Diabetic Self Management Education     Problem: Skin  Goal: Participates in plan/prevention/treatment measures  Outcome: Progressing  Flowsheets (Taken 2/20/2025 0030)  Participates in plan/prevention/treatment measures: Increase activity/out of bed for meals  Goal: Prevent/manage excess moisture  Outcome: Progressing  Flowsheets (Taken 2/20/2025 0030)  Prevent/manage excess moisture:   Cleanse incontinence/protect with barrier cream   Follow provider orders for dressing changes   Moisturize dry skin   Monitor for/manage infection if present  Goal: Prevent/minimize sheer/friction injuries  Outcome: Progressing  Flowsheets (Taken 2/20/2025 0030)  Prevent/minimize sheer/friction injuries:   HOB 30 degrees or less   Increase activity/out of bed for meals   Turn/reposition every 2 hours/use positioning/transfer devices   Use pull sheet   Utilize specialty bed per algorithm  Goal: Promote/optimize nutrition  Outcome: Progressing  Flowsheets  (Taken 2/20/2025 0030)  Promote/optimize nutrition:   Assist with feeding   Monitor/record intake including meals     Problem: Fall/Injury  Goal: Not fall by end of shift  Outcome: Progressing  Goal: Be free from injury by end of the shift  Outcome: Progressing  Goal: Verbalize understanding of personal risk factors for fall in the hospital  Outcome: Progressing  Goal: Verbalize understanding of risk factor reduction measures to prevent injury from fall in the home  Outcome: Progressing   The patient's goals for the shift include      The clinical goals for the shift include Maintain hemodynamic stability    Over the shift, the patient did not make progress toward the following goals. Barriers to progression include . Recommendations to address these barriers include .

## 2025-02-20 NOTE — CARE PLAN
The patient's goals for the shift include  rest    The clinical goals for the shift include Maintain hemodynamic stability      Problem: Skin  Goal: Participates in plan/prevention/treatment measures  Flowsheets (Taken 2/20/2025 0029)  Participates in plan/prevention/treatment measures:   Discuss with provider PT/OT consult   Elevate heels   Increase activity/out of bed for meals  Goal: Prevent/manage excess moisture  Flowsheets (Taken 2/20/2025 0029)  Prevent/manage excess moisture:   Cleanse incontinence/protect with barrier cream   Moisturize dry skin   Follow provider orders for dressing changes   Monitor for/manage infection if present  Goal: Prevent/minimize sheer/friction injuries  Flowsheets (Taken 2/20/2025 0029)  Prevent/minimize sheer/friction injuries:   Complete micro-shifts as needed if patient unable. Adjust patient position to relieve pressure points, not a full turn   Increase activity/out of bed for meals   Use pull sheet   HOB 30 degrees or less   Turn/reposition every 2 hours/use positioning/transfer devices   Utilize specialty bed per algorithm  Goal: Promote/optimize nutrition  Flowsheets (Taken 2/20/2025 0029)  Promote/optimize nutrition:   Monitor/record intake including meals   Consume > 50% meals/supplements   Discuss with provider if NPO > 2 days     Problem: Pain - Adult  Goal: Verbalizes/displays adequate comfort level or baseline comfort level  Outcome: Progressing     Problem: Safety - Adult  Goal: Free from fall injury  Outcome: Progressing     Problem: Discharge Planning  Goal: Discharge to home or other facility with appropriate resources  Outcome: Progressing     Problem: Chronic Conditions and Co-morbidities  Goal: Patient's chronic conditions and co-morbidity symptoms are monitored and maintained or improved  Outcome: Progressing     Problem: Nutrition  Goal: Nutrient intake appropriate for maintaining nutritional needs  Outcome: Progressing     Problem: Diabetes  Goal: Achieve  decreasing blood glucose levels by end of shift  Outcome: Progressing  Goal: Increase stability of blood glucose readings by end of shift  Outcome: Progressing  Goal: Decrease in ketones present in urine by end of shift  Outcome: Progressing  Goal: Maintain electrolyte levels within acceptable range throughout shift  Outcome: Progressing  Goal: Maintain glucose levels >70mg/dl to <250mg/dl throughout shift  Outcome: Progressing  Goal: No changes in neurological exam by end of shift  Outcome: Progressing  Goal: Learn about and adhere to nutrition recommendations by end of shift  Outcome: Progressing  Goal: Vital signs within normal range for age by end of shift  Outcome: Progressing  Goal: Increase self care and/or family involovement by end of shift  Outcome: Progressing  Goal: Receive DSME education by end of shift  Outcome: Progressing

## 2025-02-20 NOTE — POST-PROCEDURE NOTE
Physician Transition of Care Summary  Invasive Cardiovascular Lab    Procedure Date: 2/20/2025  Attending:    * Navin Hughes - Primary  Resident/Fellow/Other Assistant: Surgeons and Role:  * No surgeons found with a matching role *    Indications:   Pre-op Diagnosis      * Influenza A [J10.1]     * NSTEMI (non-ST elevated myocardial infarction) (Multi) [I21.4]    Post-procedure diagnosis:   Post-op Diagnosis     * Influenza A [J10.1]     * NSTEMI (non-ST elevated myocardial infarction) (Multi) [I21.4]    Procedure(s):   Left Heart Cath  18974 - CO L HRT CATH W/NJX L VENTRICULOGRAPHY IMG S&I        Procedure Findings:   Normal coronary arteries    Description of the Procedure:   The patient was brought to the Cath Lab in fasting state and prepped and draped in sterile fashion with particular attention to the right wrist.  A formal timeout was performed to ensure proper patient as well as site indication procedure.  1% lidocaine solution was used to anesthetize the area overlying the right radial artery just proximal to wrist.  A standard radial needle was used for access and ultimately upsized to a 6 [place] radial sheath.  An intra-arterial admixture of 5 mg verapamil and 200 mcg nitroglycerin was injected via the sheath.  We then proceed with the diagnostic portion exam advancing a 6 [place] JR 5 diagnostic catheter over a hydrophilic baby J-wire to the level of the coronary cusps.  The wire was withdrawn and the catheter was aspirated and flushed.  We engaged the catheter into the right coronary artery performed angiography and disengage.  We then remove this catheter and exchanged it in standard exchange fashion for a 6 [place] JL 3.5 diagnostic catheter.  The wire was withdrawn and the catheter was aspirated and flushed.  We then engaged the catheter into the left main performed angiography and disengaged. The catheter was removed over a wire.  A TR band was placed with adequate hemostasis upon removal of the  sheath.     LMCA-trifurcates, angiographically normal    LAD-2 diagonals and a recurrent apical branch.  The LAD system is angiographically normal.    Circumflex-gives rise to 2 obtuse marginals and a posterolateral branch.  The circumflex is angiographically normal.    RCA-dominant giving rise to the right posterior descending artery and a posterolateral branch.  The RCA is angiographically normal    Complications:   none    Stents/Implants:   Implants       No implant documentation for this case.            Anticoagulation/Antiplatelet Plan:   NA    Estimated Blood Loss:   10 mL    Anesthesia: Moderate Sedation Anesthesia Staff: No anesthesia staff entered.    Any Specimen(s) Removed:   No specimens collected during this procedure.    Disposition:   ICU      Electronically signed by: Navin Hughes DO, 2/20/2025 3:31 PM

## 2025-02-20 NOTE — CARE PLAN
Problem: Pain - Adult  Goal: Verbalizes/displays adequate comfort level or baseline comfort level  Outcome: Progressing     Problem: Safety - Adult  Goal: Free from fall injury  Outcome: Progressing     Problem: Discharge Planning  Goal: Discharge to home or other facility with appropriate resources  Outcome: Progressing     Problem: Chronic Conditions and Co-morbidities  Goal: Patient's chronic conditions and co-morbidity symptoms are monitored and maintained or improved  Outcome: Progressing     Problem: Nutrition  Goal: Nutrient intake appropriate for maintaining nutritional needs  Outcome: Progressing     Problem: Diabetes  Goal: Achieve decreasing blood glucose levels by end of shift  Outcome: Progressing  Goal: Increase stability of blood glucose readings by end of shift  Outcome: Progressing  Goal: Decrease in ketones present in urine by end of shift  Outcome: Progressing  Goal: Maintain electrolyte levels within acceptable range throughout shift  Outcome: Progressing  Goal: Maintain glucose levels >70mg/dl to <250mg/dl throughout shift  Outcome: Progressing  Goal: No changes in neurological exam by end of shift  Outcome: Progressing  Goal: Learn about and adhere to nutrition recommendations by end of shift  Outcome: Progressing  Goal: Vital signs within normal range for age by end of shift  Outcome: Progressing  Goal: Increase self care and/or family involovement by end of shift  Outcome: Progressing  Goal: Receive DSME education by end of shift  Outcome: Progressing     Problem: Skin  Goal: Participates in plan/prevention/treatment measures  Outcome: Progressing  Flowsheets (Taken 2/20/2025 1748)  Participates in plan/prevention/treatment measures:   Discuss with provider PT/OT consult   Elevate heels   Increase activity/out of bed for meals  Goal: Prevent/manage excess moisture  Outcome: Progressing  Flowsheets (Taken 2/20/2025 1748)  Prevent/manage excess moisture:   Cleanse incontinence/protect with  barrier cream   Monitor for/manage infection if present   Follow provider orders for dressing changes   Moisturize dry skin  Goal: Prevent/minimize sheer/friction injuries  Outcome: Progressing  Flowsheets (Taken 2/20/2025 1748)  Prevent/minimize sheer/friction injuries:   Complete micro-shifts as needed if patient unable. Adjust patient position to relieve pressure points, not a full turn   Increase activity/out of bed for meals   Use pull sheet   Turn/reposition every 2 hours/use positioning/transfer devices   Utilize specialty bed per algorithm   HOB 30 degrees or less  Goal: Promote/optimize nutrition  Outcome: Progressing  Flowsheets (Taken 2/20/2025 1748)  Promote/optimize nutrition:   Monitor/record intake including meals   Consume > 50% meals/supplements   Offer water/supplements/favorite foods     Problem: Fall/Injury  Goal: Not fall by end of shift  Outcome: Progressing  Goal: Be free from injury by end of the shift  Outcome: Progressing  Goal: Verbalize understanding of personal risk factors for fall in the hospital  Outcome: Progressing  Goal: Verbalize understanding of risk factor reduction measures to prevent injury from fall in the home  Outcome: Progressing   The patient's goals for the shift include      The clinical goals for the shift include Maintain hemodynamic stability

## 2025-02-20 NOTE — PROGRESS NOTES
Subjective Data:  Patient is feeling better.  Shortness of breath improving.  Hypotension improving.  Denies having chest pain.    Overnight Events:    Telemetry overnight reviewed no events     Objective Data:  Last Recorded Vitals:  Vitals:    02/20/25 1545 02/20/25 1600 02/20/25 1615 02/20/25 1630   BP: 88/57 97/57 94/57 103/60   Pulse: 82 79 77 79   Resp: 16 20 (!) 0 16   Temp:  36.8 °C (98.2 °F)     TempSrc:  Oral     SpO2: 95% 97% 97% 98%   Weight:       Height:           Last Labs:  CBC - 2/20/2025:  4:47 AM  12.3 12.0 251    36.1      CMP - 2/20/2025: 11:55 AM  7.4 6.3 34 --- 0.4   1.5 2.8 22 87      PTT - 2/20/2025:  4:47 AM  _   _ 44.0     TROPHS   Date/Time Value Ref Range Status   02/20/2025 07:28 AM 1,788 0 - 13 ng/L Final   02/18/2025 11:14  0 - 13 ng/L Final     Comment:     Previous result verified on 2/18/2025 1050 on specimen/case [id number] called with component [facility] for procedure Troponin I, High Sensitivity, Initial with value 382 ng/L.   02/18/2025 10:05  0 - 13 ng/L Final     BNP   Date/Time Value Ref Range Status   02/18/2025 12:35 PM 90 0 - 99 pg/mL Final     HGBA1C   Date/Time Value Ref Range Status   02/18/2025 12:35 PM 11.2 See comment % Final   12/05/2024 04:01 PM 7.2 4.2 - 6.5 % Final   09/05/2024 04:13 PM 7.4 4.2 - 6.5 % Final     LDLCALC   Date/Time Value Ref Range Status   02/19/2025 04:53 AM   Final     Comment:     The calculation of LDL and VLDL are inaccurate when the Triglycerides are greater than 400 mg/dL or when the patient is non-fasting. If LDL measurement is necessary contact the testing laboratory for an alternative LDL assay.                                  Near   Borderline      AGE      Desirable  Optimal    High     High     Very High     0-19 Y     0 - 109     ---    110-129   >/= 130     ----    20-24 Y     0 - 119     ---    120-159   >/= 160     ----      >24 Y     0 -  99   100-129  130-159   160-189     >/=190     10/18/2024 10:40  <=99  mg/dL Final     Comment:                                 Near   Borderline      AGE      Desirable  Optimal    High     High     Very High     0-19 Y     0 - 109     ---    110-129   >/= 130     ----    20-24 Y     0 - 119     ---    120-159   >/= 160     ----      >24 Y     0 -  99   100-129  130-159   160-189     >/=190     09/19/2023 09:00  65 - 130 MG/DL Final   09/21/2022 02:13 PM 97 65 - 130 MG/DL Final   03/25/2021 04:24  65 - 130 MG/DL Final     VLDL   Date/Time Value Ref Range Status   02/19/2025 04:53 AM   Final     Comment:     Unable to calculate VLDL.   10/18/2024 10:40 AM 21 0 - 40 mg/dL Final      Last I/O:  I/O last 3 completed shifts:  In: 3033.4 (55.1 mL/kg) [I.V.:1245 (22.6 mL/kg); IV Piggyback:1788.3]  Out: 350 (6.4 mL/kg) [Urine:350 (0.2 mL/kg/hr)]  Weight: 55.1 kg     Past Cardiology Tests (Last 3 Years):  EKG:  ECG 12 Lead 02/20/2025      ECG 12 lead 02/20/2025      ECG 12 Lead       ECG 12 lead 02/18/2025      ECG 12 lead 02/18/2025      ECG 12 lead (Clinic Performed)     Echo:  Transthoracic Echo (TTE) Complete 02/18/2025    Ejection Fractions:  EF   Date/Time Value Ref Range Status   02/18/2025 03:41 PM 33 %      Cath:  No results found for this or any previous visit from the past 1095 days.    Stress Test:  No results found for this or any previous visit from the past 1095 days.    Cardiac Imaging:  No results found for this or any previous visit from the past 1095 days.      Inpatient Medications:  Scheduled medications   Medication Dose Route Frequency    aspirin  81 mg oral Daily    [Held by provider] empagliflozin  25 mg oral Daily    magnesium sulfate  2 g intravenous Once    PARoxetine  60 mg oral Daily    sodium phosphate  15 mmol intravenous Once    traZODone  200 mg oral Nightly     PRN medications   Medication    acetaminophen    dextrose 10 % in water (D10W)    dextrose 10 % in water (D10W)    dextrose 10 % in water (D10W)    dextrose 5%-0.45 % sodium chloride     dextrose    dextrose    dextrose    glucagon    hydrOXYzine HCL    insulin regular    ipratropium-albuteroL    morphine    polyethylene glycol    prochlorperazine     Continuous Medications   Medication Dose Last Rate    dextrose 10 % in water (D10W)  75 mL/hr Stopped (02/20/25 1610)    dextrose 10 % in water (D10W)  150 mL/hr      dextrose 10 % in water (D10W)  150 mL/hr      dextrose 5%-0.45 % sodium chloride  150 mL/hr      insulin regular  0-50 Units/hr 2 Units/hr (02/20/25 1339)    norepinephrine  0-0.5 mcg/kg/min 0.11 mcg/kg/min (02/20/25 1348)    sodium chloride  250 mL/hr         Physical Exam:  General: Patient is in no acute distress.  HEENT: atraumatic normocephalic.  Neck: is supple jugular venous pressure within normal limits no thyromegaly.  Cardiovascular regular rate and rhythm normal heart sounds no murmurs rubs or gallops.  Lungs: clear to auscultation bilaterally.  Abdomen: is soft nontender.  Extremities warm to touch no edema.       Assessment/Plan   #1 acute non-ST elevation myocardial infarction.  Cardiac catheterization today showed no significant coronary artery disease.  LV gram pattern on the echocardiogram suggestive of stress cardiomyopathy or could be related also to influenza infection.  Recommendation to treat with baby aspirin.  Patient currently also in DKA.  Recommend management of her DKA by primary team.  She is not in cardiogenic shock.  She was hypotensive likely secondary to dehydration and DKA.  Now her blood pressure is improving.  No indication for pressors and if needed Levophed will be appropriate.  Will follow-up in AM.      Peripheral IV 02/19/25 20 G Left;Anterior Forearm (Active)   Site Assessment Clean;Dry;Intact;No hematoma;Soft 02/20/25 0900   Dressing Status Clean;Dry;Occlusive 02/20/25 0900   Number of days: 1       Peripheral IV 02/20/25 20 G Left;Ventral Wrist (Active)   Site Assessment Clean;Dry;Intact;No hematoma;Soft 02/20/25 0900   Dressing Status  Clean;Dry;Occlusive 02/20/25 0900   Number of days: 0       Peripheral IV 02/20/25 20 G Left;Proximal;Anterior;Upper Forearm (Active)   Site Assessment Clean;Dry;Intact;No hematoma;Soft 02/20/25 0900   Dressing Status Occlusive;Dry;Clean 02/20/25 0900   Number of days: 0       Code Status:  Full Code      Freya June MD

## 2025-02-20 NOTE — PROGRESS NOTES
02/20/25 1557   Physical Activity   On average, how many days per week do you engage in moderate to strenuous exercise (like a brisk walk)? 5 days   On average, how many minutes do you engage in exercise at this level? 20 min   Financial Resource Strain   How hard is it for you to pay for the very basics like food, housing, medical care, and heating? Not very   Housing Stability   In the last 12 months, was there a time when you were not able to pay the mortgage or rent on time? N   In the past 12 months, how many times have you moved where you were living? 1   At any time in the past 12 months, were you homeless or living in a shelter (including now)? N   Transportation Needs   In the past 12 months, has lack of transportation kept you from medical appointments or from getting medications? no   In the past 12 months, has lack of transportation kept you from meetings, work, or from getting things needed for daily living? No   Food Insecurity   Within the past 12 months, you worried that your food would run out before you got the money to buy more. Never true   Within the past 12 months, the food you bought just didn't last and you didn't have money to get more. Never true   Stress   Do you feel stress - tense, restless, nervous, or anxious, or unable to sleep at night because your mind is troubled all the time - these days? Not at all   Social Connections   In a typical week, how many times do you talk on the phone with family, friends, or neighbors? More than 3   How often do you get together with friends or relatives? More than 3   How often do you attend Latter-day or Mandaen services? More than 4   Do you belong to any clubs or organizations such as Latter-day groups, unions, fraternal or athletic groups, or school groups? No   How often do you attend meetings of the clubs or organizations you belong to? Never   Are you , , , , never , or living with a partner?     Intimate Partner Violence   Within the last year, have you been afraid of your partner or ex-partner? No   Within the last year, have you been humiliated or emotionally abused in other ways by your partner or ex-partner? No   Within the last year, have you been kicked, hit, slapped, or otherwise physically hurt by your partner or ex-partner? No   Within the last year, have you been raped or forced to have any kind of sexual activity by your partner or ex-partner? No   Alcohol Use   Q1: How often do you have a drink containing alcohol? Monthly or l   Q2: How many drinks containing alcohol do you have on a typical day when you are drinking? 1 or 2   Q3: How often do you have six or more drinks on one occasion? Never   Utilities   In the past 12 months has the electric, gas, oil, or water company threatened to shut off services in your home? No   Health Literacy   How often do you need to have someone help you when you read instructions, pamphlets, or other written material from your doctor or pharmacy? Never

## 2025-02-21 LAB
ALBUMIN SERPL BCP-MCNC: 2.9 G/DL (ref 3.4–5)
ALBUMIN SERPL BCP-MCNC: NORMAL G/DL
ANION GAP SERPL CALCULATED.3IONS-SCNC: 12 MMOL/L (ref 10–20)
ANION GAP SERPL CALCULATED.3IONS-SCNC: NORMAL MMOL/L
BASOPHILS # BLD AUTO: 0.02 X10*3/UL (ref 0–0.1)
BASOPHILS NFR BLD AUTO: 0.2 %
BUN SERPL-MCNC: 7 MG/DL (ref 6–23)
BUN SERPL-MCNC: NORMAL MG/DL
CA-I BLD-SCNC: 0.99 MMOL/L (ref 1.1–1.33)
CALCIUM SERPL-MCNC: 7.5 MG/DL (ref 8.6–10.3)
CALCIUM SERPL-MCNC: NORMAL MG/DL
CHLORIDE SERPL-SCNC: 106 MMOL/L (ref 98–107)
CHLORIDE SERPL-SCNC: NORMAL MMOL/L
CO2 SERPL-SCNC: 22 MMOL/L (ref 21–32)
CO2 SERPL-SCNC: NORMAL MMOL/L
CREAT SERPL-MCNC: 0.68 MG/DL (ref 0.5–1.05)
CREAT SERPL-MCNC: NORMAL MG/DL
EGFRCR SERPLBLD CKD-EPI 2021: >90 ML/MIN/1.73M*2
EGFRCR SERPLBLD CKD-EPI 2021: NORMAL ML/MIN/{1.73_M2}
EOSINOPHIL # BLD AUTO: 0.01 X10*3/UL (ref 0–0.7)
EOSINOPHIL NFR BLD AUTO: 0.1 %
ERYTHROCYTE [DISTWIDTH] IN BLOOD BY AUTOMATED COUNT: 13.3 % (ref 11.5–14.5)
GLUCOSE BLD MANUAL STRIP-MCNC: 173 MG/DL (ref 74–99)
GLUCOSE BLD MANUAL STRIP-MCNC: 175 MG/DL (ref 74–99)
GLUCOSE BLD MANUAL STRIP-MCNC: 226 MG/DL (ref 74–99)
GLUCOSE BLD MANUAL STRIP-MCNC: 230 MG/DL (ref 74–99)
GLUCOSE BLD MANUAL STRIP-MCNC: 259 MG/DL (ref 74–99)
GLUCOSE SERPL-MCNC: 212 MG/DL (ref 74–99)
GLUCOSE SERPL-MCNC: NORMAL MG/DL
HCT VFR BLD AUTO: 39.6 % (ref 36–46)
HGB BLD-MCNC: 13.3 G/DL (ref 12–16)
HOLD SPECIMEN: NORMAL
IMM GRANULOCYTES # BLD AUTO: 0.03 X10*3/UL (ref 0–0.7)
IMM GRANULOCYTES NFR BLD AUTO: 0.3 % (ref 0–0.9)
LYMPHOCYTES # BLD AUTO: 2.35 X10*3/UL (ref 1.2–4.8)
LYMPHOCYTES NFR BLD AUTO: 23.5 %
MAGNESIUM SERPL-MCNC: 2.2 MG/DL (ref 1.6–2.4)
MAGNESIUM SERPL-MCNC: NORMAL MG/DL
MCH RBC QN AUTO: 28.2 PG (ref 26–34)
MCHC RBC AUTO-ENTMCNC: 33.6 G/DL (ref 32–36)
MCV RBC AUTO: 84 FL (ref 80–100)
MONOCYTES # BLD AUTO: 0.58 X10*3/UL (ref 0.1–1)
MONOCYTES NFR BLD AUTO: 5.8 %
NEUTROPHILS # BLD AUTO: 6.99 X10*3/UL (ref 1.2–7.7)
NEUTROPHILS NFR BLD AUTO: 70.1 %
NRBC BLD-RTO: 0 /100 WBCS (ref 0–0)
PHOSPHATE SERPL-MCNC: 3.1 MG/DL (ref 2.5–4.9)
PHOSPHATE SERPL-MCNC: NORMAL MG/DL
PLATELET # BLD AUTO: 260 X10*3/UL (ref 150–450)
POTASSIUM SERPL-SCNC: 4.4 MMOL/L (ref 3.5–5.3)
POTASSIUM SERPL-SCNC: NORMAL MMOL/L
RBC # BLD AUTO: 4.72 X10*6/UL (ref 4–5.2)
SODIUM SERPL-SCNC: 136 MMOL/L (ref 136–145)
SODIUM SERPL-SCNC: NORMAL MMOL/L
WBC # BLD AUTO: 10 X10*3/UL (ref 4.4–11.3)

## 2025-02-21 PROCEDURE — 2500000005 HC RX 250 GENERAL PHARMACY W/O HCPCS

## 2025-02-21 PROCEDURE — 80069 RENAL FUNCTION PANEL: CPT

## 2025-02-21 PROCEDURE — 2500000004 HC RX 250 GENERAL PHARMACY W/ HCPCS (ALT 636 FOR OP/ED)

## 2025-02-21 PROCEDURE — 99291 CRITICAL CARE FIRST HOUR: CPT

## 2025-02-21 PROCEDURE — 85025 COMPLETE CBC W/AUTO DIFF WBC: CPT

## 2025-02-21 PROCEDURE — 99233 SBSQ HOSP IP/OBS HIGH 50: CPT | Performed by: INTERNAL MEDICINE

## 2025-02-21 PROCEDURE — 2500000001 HC RX 250 WO HCPCS SELF ADMINISTERED DRUGS (ALT 637 FOR MEDICARE OP)

## 2025-02-21 PROCEDURE — 2500000002 HC RX 250 W HCPCS SELF ADMINISTERED DRUGS (ALT 637 FOR MEDICARE OP, ALT 636 FOR OP/ED)

## 2025-02-21 PROCEDURE — 97116 GAIT TRAINING THERAPY: CPT | Mod: GP

## 2025-02-21 PROCEDURE — 82947 ASSAY GLUCOSE BLOOD QUANT: CPT

## 2025-02-21 PROCEDURE — 36415 COLL VENOUS BLD VENIPUNCTURE: CPT

## 2025-02-21 PROCEDURE — 97165 OT EVAL LOW COMPLEX 30 MIN: CPT | Mod: GO

## 2025-02-21 PROCEDURE — 82330 ASSAY OF CALCIUM: CPT

## 2025-02-21 PROCEDURE — 83735 ASSAY OF MAGNESIUM: CPT

## 2025-02-21 PROCEDURE — 97162 PT EVAL MOD COMPLEX 30 MIN: CPT | Mod: GP

## 2025-02-21 PROCEDURE — 2020000001 HC ICU ROOM DAILY

## 2025-02-21 RX ORDER — DEXTROSE 50 % IN WATER (D50W) INTRAVENOUS SYRINGE
12.5
Status: DISCONTINUED | OUTPATIENT
Start: 2025-02-21 | End: 2025-02-27 | Stop reason: HOSPADM

## 2025-02-21 RX ORDER — CALCIUM GLUCONATE 20 MG/ML
2 INJECTION, SOLUTION INTRAVENOUS ONCE
Status: COMPLETED | OUTPATIENT
Start: 2025-02-21 | End: 2025-02-21

## 2025-02-21 RX ORDER — INSULIN LISPRO 100 [IU]/ML
0-15 INJECTION, SOLUTION INTRAVENOUS; SUBCUTANEOUS EVERY 4 HOURS
Status: DISCONTINUED | OUTPATIENT
Start: 2025-02-21 | End: 2025-02-22

## 2025-02-21 RX ORDER — INSULIN GLARGINE 100 [IU]/ML
20 INJECTION, SOLUTION SUBCUTANEOUS EVERY 24 HOURS
Status: DISCONTINUED | OUTPATIENT
Start: 2025-02-22 | End: 2025-02-22

## 2025-02-21 RX ORDER — DEXTROSE 50 % IN WATER (D50W) INTRAVENOUS SYRINGE
25
Status: DISCONTINUED | OUTPATIENT
Start: 2025-02-21 | End: 2025-02-27 | Stop reason: HOSPADM

## 2025-02-21 RX ORDER — ENOXAPARIN SODIUM 100 MG/ML
40 INJECTION SUBCUTANEOUS DAILY
Status: DISCONTINUED | OUTPATIENT
Start: 2025-02-21 | End: 2025-02-27 | Stop reason: HOSPADM

## 2025-02-21 RX ORDER — INSULIN LISPRO 100 [IU]/ML
0-15 INJECTION, SOLUTION INTRAVENOUS; SUBCUTANEOUS
Status: DISCONTINUED | OUTPATIENT
Start: 2025-02-21 | End: 2025-02-21

## 2025-02-21 RX ADMIN — SODIUM CHLORIDE, POTASSIUM CHLORIDE, SODIUM LACTATE AND CALCIUM CHLORIDE 500 ML: 600; 310; 30; 20 INJECTION, SOLUTION INTRAVENOUS at 22:50

## 2025-02-21 RX ADMIN — ASPIRIN 81 MG CHEWABLE TABLET 81 MG: 81 TABLET CHEWABLE at 08:59

## 2025-02-21 RX ADMIN — CALCIUM GLUCONATE 2 G: 20 INJECTION, SOLUTION INTRAVENOUS at 08:04

## 2025-02-21 RX ADMIN — PAROXETINE HYDROCHLORIDE 60 MG: 20 TABLET, FILM COATED ORAL at 08:59

## 2025-02-21 RX ADMIN — INSULIN LISPRO 3 UNITS: 100 INJECTION, SOLUTION INTRAVENOUS; SUBCUTANEOUS at 17:04

## 2025-02-21 RX ADMIN — ENOXAPARIN SODIUM 40 MG: 40 INJECTION SUBCUTANEOUS at 11:54

## 2025-02-21 RX ADMIN — INSULIN LISPRO 6 UNITS: 100 INJECTION, SOLUTION INTRAVENOUS; SUBCUTANEOUS at 08:59

## 2025-02-21 RX ADMIN — INSULIN LISPRO 3 UNITS: 100 INJECTION, SOLUTION INTRAVENOUS; SUBCUTANEOUS at 20:35

## 2025-02-21 RX ADMIN — INSULIN LISPRO 9 UNITS: 100 INJECTION, SOLUTION INTRAVENOUS; SUBCUTANEOUS at 12:55

## 2025-02-21 RX ADMIN — TRAZODONE HYDROCHLORIDE 200 MG: 100 TABLET ORAL at 20:35

## 2025-02-21 ASSESSMENT — PAIN - FUNCTIONAL ASSESSMENT
PAIN_FUNCTIONAL_ASSESSMENT: FLACC (FACE, LEGS, ACTIVITY, CRY, CONSOLABILITY)
PAIN_FUNCTIONAL_ASSESSMENT: 0-10

## 2025-02-21 ASSESSMENT — COGNITIVE AND FUNCTIONAL STATUS - GENERAL
CLIMB 3 TO 5 STEPS WITH RAILING: A LITTLE
MOVING TO AND FROM BED TO CHAIR: A LITTLE
DAILY ACTIVITIY SCORE: 23
PERSONAL GROOMING: A LITTLE
MOBILITY SCORE: 20
DAILY ACTIVITIY SCORE: 24
STANDING UP FROM CHAIR USING ARMS: A LITTLE
STANDING UP FROM CHAIR USING ARMS: A LITTLE
MOVING FROM LYING ON BACK TO SITTING ON SIDE OF FLAT BED WITH BEDRAILS: A LITTLE
TURNING FROM BACK TO SIDE WHILE IN FLAT BAD: A LITTLE
MOBILITY SCORE: 18
MOVING TO AND FROM BED TO CHAIR: A LITTLE
WALKING IN HOSPITAL ROOM: A LITTLE
CLIMB 3 TO 5 STEPS WITH RAILING: A LITTLE
WALKING IN HOSPITAL ROOM: A LITTLE

## 2025-02-21 ASSESSMENT — PAIN SCALES - GENERAL
PAINLEVEL_OUTOF10: 0 - NO PAIN

## 2025-02-21 ASSESSMENT — ACTIVITIES OF DAILY LIVING (ADL)
ADL_ASSISTANCE: INDEPENDENT
BATHING_ASSISTANCE: INDEPENDENT
ADL_ASSISTANCE: INDEPENDENT

## 2025-02-21 NOTE — PROGRESS NOTES
Physical Therapy    Physical Therapy Evaluation & Treatment    Patient Name: Desiree Shepard  MRN: [id number]  Department: [facility] S ICU  Room: 14/14-A  Today's Date: 2/21/2025   Time Calculation  Start Time: 1306  Stop Time: 1330  Time Calculation (min): 24 min    Assessment/Plan   PT Assessment  PT Assessment Results: Decreased strength, Decreased endurance, Impaired balance, Decreased mobility, Decreased coordination, Impaired judgement, Decreased safety awareness, Pain  Rehab Prognosis: Excellent  Barriers to Discharge Home: No anticipated barriers  Evaluation/Treatment Tolerance: Patient tolerated treatment well  Medical Staff Made Aware: Yes  Strengths: Ability to acquire knowledge  Barriers to Participation: Comorbidities  End of Session Communication: Bedside nurse  Assessment Comment: The patient is a 64 y.o. female admitted to the hospital for increasing SOB and found to have +FLU. The patient currently requires CGA to supervision for transfers and ambulation with no AD. The patient would continue to benefit from skilled therapy services to address functional deficits.  End of Session Patient Position: Up in chair, Alarm off, not on at start of session   IP OR SWING BED PT PLAN  Inpatient or Swing Bed: Inpatient  PT Plan  Treatment/Interventions: Bed mobility, Transfer training, Gait training, Balance training, Neuromuscular re-education, Endurance training, Strengthening, Therapeutic exercise, Therapeutic activity  PT Plan: Ongoing PT  PT Frequency: 4 times per week  PT Discharge Recommendations: Low intensity level of continued care  PT Recommended Transfer Status: Assist x1  PT - OK to Discharge: Yes      Subjective     General Visit Information:  General  Reason for Referral: mobility impairment due to FLU A  Referred By: Amy Flaherty PA-C  Past Medical History Relevant to Rehab: ANX, DM, HLD, PTSD, hypoxia  Family/Caregiver Present: No  Co-Treatment: OT  Co-Treatment Reason: ICU pt. maximization of pt  safety and functional mobility  Prior to Session Communication: Bedside nurse  Patient Position Received: Bed, 3 rail up, Alarm off, not on at start of session  Preferred Learning Style: verbal, visual  General Comment: Pt is a 64 year old female admit diagnosed with flu and reports of congestion and chest tightness. Pt reports worsening of symptoms since returning from vacation. Pt transferred to ICU following cardiology request of wanting stabilization of patient before performing left heart cath due to DKA and requirement of pressors.  Home Living:  Home Living  Type of Home: House  Lives With: Alone  Home Adaptive Equipment: None  Home Layout: One level, Laundry in basement  Home Access: Stairs to enter with rails  Entrance Stairs-Rails: Both  Entrance Stairs-Number of Steps: 3  Bathroom Shower/Tub: Tub/shower unit  Bathroom Toilet: Standard  Bathroom Equipment: Grab bars in shower  Prior Level of Function:  Prior Function Per Pt/Caregiver Report  Level of [place]: Independent with ADLs and functional transfers, Independent with homemaking with ambulation  Receives Help From: Family  ADL Assistance: Independent  Homemaking Assistance: Independent  Ambulatory Assistance: Independent  Vocational: Full time employment ([employer])  Prior Function Comments: pt indep PTA  Precautions:  Precautions  Hearing/Visual Limitations: reading glasses, hearing WFL  Medical Precautions: Fall precautions    Vital Signs Comment: , 15RR, BP: 109/66 (80)    Objective   Pain:  Pain Assessment  Pain Assessment: 0-10  0-10 (Numeric) Pain Score: 0 - No pain  Cognition:  Cognition  Overall Cognitive Status: Within Functional Limits  Insight: Within function limits    General Assessments:  General Observation  General Observation: pleasant; mildly anxious    Activity Tolerance  Endurance: Tolerates 10 - 20 min exercise with multiple rests  Rate of Perceived Exertion (RPE): 3/10    Sensation  Light Touch: No apparent  deficits    Strength  Strength Comments: BLE grossly >3+/5    Coordination  Coordination Comment: mildly increased time for mobility    Postural Control  Posture Comment: mild forward head posture    Dynamic Standing Balance  Dynamic Standing-Balance Support: No upper extremity supported  Dynamic Standing-Level of Assistance: Contact guard  Dynamic Standing-Comments: CGA for ambulation with no AD; decreased amado  Functional Assessments:  Bed Mobility  Bed Mobility: Yes  Bed Mobility 1  Bed Mobility 1: Supine to sitting  Level of Assistance 1: Modified independent  Bed Mobility Comments 1: HOB elevated; use of bed rails    Transfers  Transfer: Yes  Transfer 1  Transfer From 1: Bed to, Stand to  Transfer to 1: Stand, Chair with arms  Technique 1: Sit to stand, Stand to sit  Transfer Level of Assistance 1: Close supervision  Trials/Comments 1: VCs for safe sequencing    Ambulation/Gait Training  Ambulation/Gait Training Performed: Yes  Ambulation/Gait Training 1  Surface 1: Level tile  Device 1: No device  Assistance 1: Contact guard  Quality of Gait 1: Narrow base of support, Shuffling gait  Comments/Distance (ft) 1: 15ftx2 with no AD and CGA; decreased amado with mildly guarded mobility. No major LOB noted.  Extremity/Trunk Assessments:  RLE   RLE :  (>3+/5)  LLE   LLE :  (>3+/5)  Treatments:  Bed Mobility  Bed Mobility: Yes  Bed Mobility 1  Bed Mobility 1: Supine to sitting  Level of Assistance 1: Modified independent  Bed Mobility Comments 1: HOB elevated; use of bed rails    Ambulation/Gait Training  Ambulation/Gait Training Performed: Yes  Ambulation/Gait Training 1  Surface 1: Level tile  Device 1: No device  Assistance 1: Contact guard  Quality of Gait 1: Narrow base of support, Shuffling gait  Comments/Distance (ft) 1: 15ftx2 with no AD and CGA; decreased amado with mildly guarded mobility. No major LOB noted.  Transfers  Transfer: Yes  Transfer 1  Transfer From 1: Bed to, Stand to  Transfer to 1:  Stand, Chair with arms  Technique 1: Sit to stand, Stand to sit  Transfer Level of Assistance 1: Close supervision  Trials/Comments 1: VCs for safe sequencing  Outcome Measures:  [facility] Basic Mobility  Turning from your back to your side while in a flat bed without using bedrails: None  Moving from lying on your back to sitting on the side of a flat bed without using bedrails: None  Moving to and from bed to chair (including a wheelchair): A little  Standing up from a chair using your arms (e.g. wheelchair or bedside chair): A little  To walk in hospital room: A little  Climbing 3-5 steps with railing: A little  Basic Mobility - Total Score: 20    FSS-ICU  Ambulation: Walks <50 feet with any assistance x1 or walks any distance with assistance x2 people  Rolling: Modified independence, requires use of assistive device  Sitting: Modified independence, requires use of assistive device  Transfer Sit-to-Stand: Supervision or set-up only  Transfer Supine-to-Sit: Supervision or set-up only  Total Score: 23    Encounter Problems       Encounter Problems (Active)       PT Problem       Strength (Progressing)       Start:  02/21/25    Expected End:  03/21/25       The patient will demonstrate an overall strength of 4/5 in BLE to assist with completion of functional mobility.           Functional Mobility (Progressing)       Start:  02/21/25    Expected End:  03/21/25       The patient will complete functional mobility (bed mobility, transfers, etc.) at a mod indep level with LRAD by DC.           Ambulation (Progressing)       Start:  02/21/25    Expected End:  03/21/25       The patient will be able to ambulate at an indep level for >821ysg4 with no AD.          Balance (Progressing)       Start:  02/21/25    Expected End:  03/21/25       The patient will demonstrate good+ dynamic standing balance during activity with no AD.             Pain - Adult              Education Documentation  Mobility Training, taught by Annie  Lele PT at 2/21/2025  2:19 PM.  Learner: Patient  Readiness: Acceptance  Method: Explanation  Response: Verbalizes Understanding  Comment: educated pt on PT POC    Education Comments  No comments found.

## 2025-02-21 NOTE — PROGRESS NOTES
Occupational Therapy    Evaluation    Patient Name: Desiree Shepard  MRN: [id number]  Department: [facility] S ICU  Room: 14/14-A  Today's Date: 2/21/2025  Time Calculation  Start Time: 1305  Stop Time: 1320  Time Calculation (min): 15 min    Assessment  IP OT Assessment  OT Assessment: OT order received, chart reviewed, evaluation complete. Pt. did not demonstrate difficulty with functional mobility or ADLs and denied feeling dizzy or SOB. Close supervision provided for support for stability. No further acute OT needs.  Prognosis: Good  Barriers to Discharge Home: No anticipated barriers  Evaluation/Treatment Tolerance: Patient tolerated treatment well  Medical Staff Made Aware: Yes  End of Session Communication: Bedside nurse  End of Session Patient Position: Up in chair, Alarm off, not on at start of session (RN aware, pt agreeable to notify nursing for needs)  Plan:  No Skilled OT: No acute OT goals identified  OT Discharge Recommendations: No further acute OT, No OT needed after discharge  OT Recommended Transfer Status: Independent  OT - OK to Discharge: Yes    Subjective   Current Problem:  1. Influenza A  Case Request Cath Lab: Left Heart Cath    Case Request Cath Lab: Left Heart Cath    Cardiac Catheterization Procedure    Cardiac Catheterization Procedure      2. NSTEMI (non-ST elevated myocardial infarction) (Multi)  Case Request Cath Lab: Left Heart Cath    Case Request Cath Lab: Left Heart Cath    Cardiac Catheterization Procedure    Cardiac Catheterization Procedure    Referral to Cardiology Prevention Program      3. Elevated troponin  Transthoracic Echo (TTE) Complete    Transthoracic Echo (TTE) Complete      4. Cardiomyopathy, unspecified  Cardiac Catheterization Procedure        General:  General  Reason for Referral: Activities of daily living  Past Medical History Relevant to Rehab: ANX, DM, HLD, PTSD, hypoxia  Co-Treatment: PT  Co-Treatment Reason: ICU pt. maximization of pt safety and functional  mobility  Prior to Session Communication: Bedside nurse  Patient Position Received: Bed, 3 rail up, Alarm off, not on at start of session  General Comment: Pt is a 64 year old female admit diagnosed with flu and reports of congestion and chest tightness. Pt reports worsening of symptoms since returning from vacation. Pt transferred to ICU following cardiology request of wanting stabilization of patient before performing left heart cath due to DKA and requirement of pressors.  Precautions:  Hearing/Visual Limitations: reading glasses, hearing WFL  Medical Precautions: Fall precautions     Date/Time Vitals Session Patient Position Pulse Resp SpO2 BP MAP (mmHg)    02/21/25 1200 --  --  87  25  --  98/63  74     02/21/25 1230 --  --  86  18  --  100/68  78     02/21/25 1300 --  --  105  23  --  109/66  80           Vital Signs Comment: , 15RR, 109/66 (80)    Pain:  Pain Assessment  Pain Assessment: FLACC (Face, Legs, Activity, Cry, Consolability)  0-10 (Numeric) Pain Score: 0 - No pain    Objective   Cognition:  Overall Cognitive Status: Within Functional Limits  Insight: Within function limits  Processing Speed: Within funtional limits           Home Living:  Type of Home: House  Lives With: Alone  Home Adaptive Equipment: None  Home Layout: One level, Laundry in basement  Home Access: Stairs to enter with rails  Entrance Stairs-Rails: Both  Entrance Stairs-Number of Steps: 3  Bathroom Shower/Tub: Tub/shower unit  Bathroom Toilet: Standard  Bathroom Equipment: Grab bars in shower  Home Living Comments: pt. does not report any difficulty accessing home environment   Prior Function:  Level of [place]: Independent with ADLs and functional transfers, Independent with homemaking with ambulation, Independent with homemaking with wheelchair  Receives Help From: Family  ADL Assistance: Independent  Homemaking Assistance: Independent  Ambulatory Assistance: Independent  Vocational: Full time employment (work at  [employer])     ADL:  Eating Assistance: Independent  Grooming Assistance: Independent  Grooming Deficit: Setup (pt. able to wash face and brush teeth with set up assist during therapy session)  Bathing Assistance: Independent  UE Dressing Assistance: Independent  UE Dressing Deficit:  (pt UE ROM is WFL and not projected to impact pt ability to complete UE dressing)  LE Dressing Assistance: Independent  LE Dressing Deficit:  (pt. does not appear to have balance deficits and is not projected to have difficulty reaching LE while maintaining balance)  Toileting Assistance with Device: Independent  Activity Tolerance:  Endurance: Endurance does not limit participation in activity  Activity Tolerance Comments: pt. tolerated activity well  Rate of Perceived Exertion (RPE): 3  Bed Mobility/Transfers: Bed Mobility  Bed Mobility: Yes  Bed Mobility 1  Bed Mobility 1: Supine to sitting  Level of Assistance 1: Modified independent  Bed Mobility Comments 1: HOB elevated, pt able to elevate LE over EOB and elevate trunk into upright position without difficulty    Transfers  Transfer: Yes  Transfer 1  Transfer From 1: Bed to  Transfer to 1: Stand  Technique 1: Sit to stand  Transfer Level of Assistance 1: Close supervision  Trials/Comments 1: Pt able to perform tranfer and did not require increased effort or time.  Transfers 2  Transfer From 2: Stand to  Transfer to 2: Chair with arms  Technique 2: Stand to sit  Transfer Level of Assistance 2: Close supervision  Trials/Comments 2: Close supervision for line management during transfer. Pt. did not require cues for hand placement as she independently placed hands onto arms of chair to assist with controlled descent      Functional Mobility:  Functional Mobility  Functional Mobility Performed: Yes  Functional Mobility 1  Surface 1: Level tile  Device 1: No device  Assistance 1: Contact guard  Comments 1: Pt performed short household distance functional mobility with CGA provided for  support for balance and management of lines. Pt did not report feeling shortness of breath following functional mobility  Modalities:     Sensation:  Light Touch: No apparent deficits  Strength:  Strength Comments: EMY 3/5     Coordination:  Movements are Fluid and Coordinated: Yes   Hand Function:  Hand Function  Gross Grasp: Functional  Coordination: Functional  Extremities: RUE   RUE : Within Functional Limits and LUE   LUE: Within Functional Limits    Outcome Measures: [facility] Daily Activity  Putting on and taking off regular lower body clothing: None  Bathing (including washing, rinsing, drying): None  Putting on and taking off regular upper body clothing: None  Toileting, which includes using toilet, bedpan or urinal: None  Taking care of personal grooming such as brushing teeth: None  Eating Meals: None  Daily Activity - Total Score: 24    Education Documentation  ADL Training, taught by MATILDE Madera at 2/21/2025  1:44 PM.  Learner: Patient  Readiness: Acceptance  Method: Explanation  Response: Verbalizes Understanding  Comment: Pt educated on call light use and plan for therapy.    Education Comments  No comments found.      Goals:

## 2025-02-21 NOTE — CARE PLAN
Pt is here for influenza. Pt is independent at home; lives alone  No anticipated discharge needs at this time    DISCHARGE PLAN: HOME

## 2025-02-21 NOTE — CARE PLAN
The patient's goals for the shift include      The clinical goals for the shift include Remain hemodynamically stable, wean pressors    Over the shift, the patient did not make progress toward the following goals. Barriers to progression include none. Recommendations to address these barriers include none.

## 2025-02-21 NOTE — CARE PLAN
The patient's goals for the shift include  get some rest and go home    The clinical goals for the shift include Wean pressor support      Problem: Pain - Adult  Goal: Verbalizes/displays adequate comfort level or baseline comfort level  Outcome: Progressing     Problem: Safety - Adult  Goal: Free from fall injury  Outcome: Progressing     Problem: Discharge Planning  Goal: Discharge to home or other facility with appropriate resources  Outcome: Progressing     Problem: Chronic Conditions and Co-morbidities  Goal: Patient's chronic conditions and co-morbidity symptoms are monitored and maintained or improved  Outcome: Progressing     Problem: Nutrition  Goal: Nutrient intake appropriate for maintaining nutritional needs  Outcome: Progressing     Problem: Diabetes  Goal: Achieve decreasing blood glucose levels by end of shift  Outcome: Progressing  Goal: Increase stability of blood glucose readings by end of shift  Outcome: Progressing  Goal: Decrease in ketones present in urine by end of shift  Outcome: Progressing  Goal: Maintain electrolyte levels within acceptable range throughout shift  Outcome: Progressing  Goal: Maintain glucose levels >70mg/dl to <250mg/dl throughout shift  Outcome: Progressing  Goal: No changes in neurological exam by end of shift  Outcome: Progressing  Goal: Learn about and adhere to nutrition recommendations by end of shift  Outcome: Progressing  Goal: Vital signs within normal range for age by end of shift  Outcome: Progressing  Goal: Increase self care and/or family involovement by end of shift  Outcome: Progressing  Goal: Receive DSME education by end of shift  Outcome: Progressing     Problem: Skin  Goal: Participates in plan/prevention/treatment measures  Outcome: Progressing  Goal: Prevent/manage excess moisture  Outcome: Progressing  Goal: Prevent/minimize sheer/friction injuries  Outcome: Progressing  Goal: Promote/optimize nutrition  Outcome: Progressing     Problem:  Fall/Injury  Goal: Not fall by end of shift  Outcome: Progressing  Goal: Be free from injury by end of the shift  Outcome: Progressing  Goal: Verbalize understanding of personal risk factors for fall in the hospital  Outcome: Progressing  Goal: Verbalize understanding of risk factor reduction measures to prevent injury from fall in the home  Outcome: Progressing

## 2025-02-21 NOTE — PROGRESS NOTES
Subjective Data:  Patient still feeling tired.  Denies having chest pain.  Denies shortness of breath.  On room air.  Lying comfortable in bed    Overnight Events:    Telemetry overnight reviewed no events     Objective Data:  Last Recorded Vitals:  Vitals:    02/21/25 0850 02/21/25 0900 02/21/25 0930 02/21/25 0950   BP: 97/81 112/69  107/61   BP Location:    Left arm   Patient Position: Sitting Sitting  Lying   Pulse: 82 96 87 94   Resp:    23   Temp:       TempSrc:       SpO2: 98% 98% 99% 97%   Weight:       Height:           Last Labs:  CBC - 2/21/2025:  4:57 AM  10.0 13.3 260    39.6      CMP - 2/21/2025:  4:57 AM  7.5 6.3 34 --- 0.4   3.1 2.9 22 87      PTT - 2/20/2025:  4:47 AM  _   _ 44.0     TROPHS   Date/Time Value Ref Range Status   02/20/2025 07:28 AM 1,788 0 - 13 ng/L Final   02/18/2025 11:14  0 - 13 ng/L Final     Comment:     Previous result verified on 2/18/2025 1050 on specimen/case [id number] called with component [facility] for procedure Troponin I, High Sensitivity, Initial with value 382 ng/L.   02/18/2025 10:05  0 - 13 ng/L Final     BNP   Date/Time Value Ref Range Status   02/18/2025 12:35 PM 90 0 - 99 pg/mL Final     HGBA1C   Date/Time Value Ref Range Status   02/18/2025 12:35 PM 11.2 See comment % Final   12/05/2024 04:01 PM 7.2 4.2 - 6.5 % Final   09/05/2024 04:13 PM 7.4 4.2 - 6.5 % Final     LDLCALC   Date/Time Value Ref Range Status   02/19/2025 04:53 AM   Final     Comment:     The calculation of LDL and VLDL are inaccurate when the Triglycerides are greater than 400 mg/dL or when the patient is non-fasting. If LDL measurement is necessary contact the testing laboratory for an alternative LDL assay.                                  Near   Borderline      AGE      Desirable  Optimal    High     High     Very High     0-19 Y     0 - 109     ---    110-129   >/= 130     ----    20-24 Y     0 - 119     ---    120-159   >/= 160     ----      >24 Y     0 -  99   100-129  130-159    160-189     >/=190     10/18/2024 10:40  <=99 mg/dL Final     Comment:                                 Near   Borderline      AGE      Desirable  Optimal    High     High     Very High     0-19 Y     0 - 109     ---    110-129   >/= 130     ----    20-24 Y     0 - 119     ---    120-159   >/= 160     ----      >24 Y     0 -  99   100-129  130-159   160-189     >/=190     09/19/2023 09:00  65 - 130 MG/DL Final   09/21/2022 02:13 PM 97 65 - 130 MG/DL Final   03/25/2021 04:24  65 - 130 MG/DL Final     VLDL   Date/Time Value Ref Range Status   02/19/2025 04:53 AM   Final     Comment:     Unable to calculate VLDL.   10/18/2024 10:40 AM 21 0 - 40 mg/dL Final      Last I/O:  I/O last 3 completed shifts:  In: 3520.7 (62.1 mL/kg) [P.O.:100; I.V.:2665.7 (47 mL/kg); IV Piggyback:755]  Out: 860 (15.2 mL/kg) [Urine:850 (0.4 mL/kg/hr); Blood:10]  Weight: 56.7 kg     Past Cardiology Tests (Last 3 Years):  EKG:  Electrocardiogram 12 Lead 02/20/2025 (Preliminary)      ECG 12 Lead 02/20/2025      ECG 12 lead 02/20/2025      ECG 12 Lead       ECG 12 lead 02/18/2025      ECG 12 lead 02/18/2025      ECG 12 lead (Clinic Performed)     Echo:  Transthoracic Echo (TTE) Complete 02/18/2025    Ejection Fractions:  EF   Date/Time Value Ref Range Status   02/18/2025 03:41 PM 33 %      Cath:  No results found for this or any previous visit from the past 1095 days.    Stress Test:  No results found for this or any previous visit from the past 1095 days.    Cardiac Imaging:  No results found for this or any previous visit from the past 1095 days.      Inpatient Medications:  Scheduled medications   Medication Dose Route Frequency    aspirin  81 mg oral Daily    [Held by provider] empagliflozin  25 mg oral Daily    insulin glargine  15 Units subcutaneous q24h    insulin lispro  0-15 Units subcutaneous Before meals & nightly    PARoxetine  60 mg oral Daily    traZODone  200 mg oral Nightly     PRN medications   Medication     acetaminophen    dextrose    dextrose    glucagon    glucagon    hydrOXYzine HCL    ipratropium-albuteroL    morphine    polyethylene glycol    prochlorperazine     Continuous Medications   Medication Dose Last Rate    norepinephrine  0-0.5 mcg/kg/min 0.06 mcg/kg/min (02/21/25 0956)       Physical Exam:  General: Patient is in no acute distress.  HEENT: atraumatic normocephalic.  Neck: is supple jugular venous pressure within normal limits no thyromegaly.  Cardiovascular regular rate and rhythm normal heart sounds no murmurs rubs or gallops.  Lungs: clear to auscultation bilaterally.  Abdomen: is soft nontender.  Extremities warm to touch no edema.       Assessment/Plan   #1. acute non-ST elevation myocardial infarction.  Echocardiogram showed apical and anterior wall severe hypokinesis.  Cardiac catheterization showed no significant coronary disease with LINDA-3 flow.  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  Troponin elevated.  She may have myocarditis.  No indication for aspirin or NSAIDs.  Occasions for steroids.  No significant arrhythmias.  We will monitor for now.  She is weaning off pressors.  Keep maps above 65 mmHg.  Patient renal function stable.  No indication for heparin drip.  Will monitor.  Patient is not in cardiogenic shock.  If she is weaning off pressors tomorrow we will consider small dose beta-blockers.  Will follow-up in AM.  Not candidate for ACE ARB's or Arni due to hypotension.  Does not appear volume overloaded.  Left ventricular end-diastolic pressure was normal in the Cath Lab.  May give back some fluids if she become hypotensive after weaning off pressors.    2. Influenza A infection management by primary team.    3.  Hypotension.  As above.      Peripheral IV 02/19/25 20 G Left;Anterior Forearm (Active)   Site Assessment Clean;Dry;Intact 02/21/25 0400   Dressing Type Transparent 02/21/25 0400   Line Status Infusing 02/21/25 0400   Dressing Status  Clean;Dry;Occlusive 02/21/25 0400   Number of days: 2       Peripheral IV 02/20/25 20 G Left;Proximal;Anterior;Upper Forearm (Active)   Site Assessment Clean;Dry;Intact 02/21/25 0400   Dressing Type Transparent 02/21/25 0400   Line Status Infusing 02/21/25 0400   Dressing Status Clean;Dry;Occlusive 02/21/25 0400   Number of days: 1       Code Status:  Full Code    Freya June MD

## 2025-02-21 NOTE — PROGRESS NOTES
Critical Care Medicine       Date:  2/21/2025  Patient:  Desiree Shepard  YOB: 1960  MRN:  [id number]   Admit Date:  2/18/2025      Chief Complaint   Patient presents with    Chest Pain     Patient is currently positive for Flu A and is now experiencing chest pain that started about 2 days ago.  States it as a haed pain that spreads across her chest. Sent from urgent care for further eval         History of Present Illness:  Desiree Shepard is a 64 year old female with pmhx of recurrent major depressive disorder, DM, mixed hyperlipidemia, anxiety, and lumbar spondylosis. Patient was admitted on 2/18 with new onset chest pain, crushing in nature. Patient also complaining of malaise that has present for atleast a month. Patient denies radiation, n/v, syncope, chest palpitations or sob. Patients labs in ED concerning for hyperglycemia of 517, elevated troponin of 382 and repeat of 954, elevated beta hydroxybutyrate of 3.06 hba1c of 11.2 and positive flu a. 12 lead on admission was negative for st elevation. Patient at this point was admitted to step down with plan of left heart cath on 2/19 was started on a heparin gtt.     Am labs 2/19 showing worsening dka with elevated beta hydroxybutyrate of 11.14 glucose of 302, and vbg showing metabolic acidosis ph of 7.19 co2 of 20 bicarb of 7.6 and lactate of 1.6. Patient was transferred to ICU as cardiology wanted stabilization of patient before performing left heart cath. Follow up LHC planned for 2/20.      Interval ICU Events:  2/20: Currently on insulin gtt for DKA. On dobutamine @ 5 and levo at 0.1. On heparing gtt. Cardiology following for possible LHC today.     2/21: NAEO. LHC NL stopped dobutamine and heparin gtt. Still on 0.07 of levo will cont to wean. Off insulin gtt since 3am started on 15u lantus. So far sugars high so increased to 20u lantus and POCT q4h till sugars under control.     Objective     Past Medical History:   Diagnosis Date    Acid reflux      Age-related nuclear cataract of both eyes 6/13/2024    Diabetes mellitus (Multi)     Herpes     Hiatal hernia     High cholesterol     History of HPV infection     IBS (irritable bowel syndrome)     Insomnia     Rosacea     Unspecified disorder of refraction 6/13/2024     Past Surgical History:   Procedure Laterality Date    APPENDECTOMY  2005    COLONOSCOPY  2015    TORTUOUS COLON- REPEAT IN 5 YEARS--DR. HERNANDES    HERNIA REPAIR  01/2018    TUBAL LIGATION Bilateral 11/1991     Medications Prior to Admission   Medication Sig Dispense Refill Last Dose/Taking    chlorpheniramine/dextromethorp (CORICIDIN HBP COUGH AND COLD ORAL) Take 1 each by mouth once daily as needed.   2/18/2025    cholecalciferol (Vitamin D-3) 25 MCG (1000 UT) capsule Take 1 capsule (25 mcg) by mouth once every 24 hours.   2/18/2025    cyanocobalamin (Vitamin B-12) 1,000 mcg tablet Take 1 tablet (1,000 mcg) by mouth once daily.   2/18/2025    empagliflozin (Jardiance) 25 mg Take 1 tablet (25 mg) by mouth once daily. 90 tablet 3 2/18/2025    hydrOXYzine HCL (Atarax) 25 mg tablet TAKE TWO TABLETS BY MOUTH EVERY 8 HOURS FOR ANXIETY 270 tablet 3 2/18/2025    MILK THISTLE ORAL Take 1 each by mouth once daily.   2/18/2025    multivitamin tablet Take 1 tablet by mouth once daily.   2/18/2025    PARoxetine (Paxil) 40 mg tablet Take 1.5 tablets (60 mg) by mouth once daily. 135 tablet 3 2/18/2025    pravastatin (Pravachol) 20 mg tablet Take 1 tablet (20 mg) by mouth once daily. 90 tablet 3 2/18/2025    traZODone (Desyrel) 100 mg tablet TAKE TWO TABLETS BY MOUTH EVERY  tablet 3 2/17/2025    Trulicity 4.5 mg/0.5 mL pen injector INJECT 4.5MG UNDER THE SKIN ONCE WEEKLY 2 mL 3 2/17/2025    TURMERIC ORAL Take 1 each by mouth once daily.   2/18/2025    benzonatate (Tessalon) 200 mg capsule Take 1 capsule (200 mg) by mouth 3 times a day as needed for cough for up to 7 days. Do not crush or chew. (Patient not taking: Reported on 2/18/2025) 30 capsule 0 Not  Taking    blood-glu meter,cont-transmit misc as directed   Unknown    blood-glucose sensor (Dexcom G7 Sensor) device Change every 10 DAYS 3 each 11 Unknown    guaiFENesin (Mucinex) 1,200 mg tablet extended release 12hr Take 1 tablet (1,200 mg) by mouth every 12 hours if needed (cough) for up to 7 days. (Patient not taking: Reported on 2/18/2025) 14 tablet 0 Not Taking    zoster vaccine-recombinant adjuvanted (Shingrix) 50 mcg/0.5 mL vaccine Inject 0.5 mL into the muscle every 8 (eight) weeks. (Patient not taking: Reported on 9/5/2024) 1 mL 0      Penicillin, Statins-hmg-coa reductase inhibitors, and Sulfamethoxazole-trimethoprim  Social History     Tobacco Use    Smoking status: Never     Passive exposure: Never    Smokeless tobacco: Never   Vaping Use    Vaping status: Never Used   Substance Use Topics    Alcohol use: Not Currently    Drug use: Never     Family History   Problem Relation Name Age of Onset    Ulcerative colitis Mother      Throat cancer Father      Aneurysm Father          under his belly button - inherited    Leukemia Father      Other (abdominal aortic aneurysm) Father      Diabetes Father      Hypertension Father      Lupus Sister      Other (factor v) Sister      Blood clot Sister      Cancer Sister      Heart attack Brother  40    Testicular cancer Brother      Deep vein thrombosis Brother      Heart disease Brother      Hypertension Brother      Lupus Daughter      Ulcerative colitis Daughter      Obesity Daughter      Cancer Daughter      No Known Problems Daughter      No Known Problems Daughter      Other (abdominal aortic aneurysm) Paternal Grandfather      Lupus Half-Sister         [facility] Medications:    norepinephrine, 0-0.5 mcg/kg/min, Last Rate: 0.06 mcg/kg/min (02/21/25 0953)          Current Facility-Administered Medications:     acetaminophen (Tylenol) tablet 650 mg, 650 mg, oral, q6h PRN, Amy Flaherty PA-C    aspirin chewable tablet 81 mg, 81 mg, oral, Daily, Amy Flaherty  LANETTE, 81 mg at 02/21/25 0859    dextrose 50 % injection 12.5 g, 12.5 g, intravenous, q15 min PRN, Jose Amos PA-C    dextrose 50 % injection 25 g, 25 g, intravenous, q15 min PRN, Jose Amos PA-C    [Held by provider] empagliflozin (Jardiance) tablet 25 mg, 25 mg, oral, Daily, Amy Flaherty PA-C    glucagon (Glucagen) injection 1 mg, 1 mg, intramuscular, q15 min PRN, Jose Amos PA-C    glucagon (Glucagen) injection 1 mg, 1 mg, intramuscular, q15 min PRN, Jose Amos PA-C    hydrOXYzine HCL (Atarax) tablet 25 mg, 25 mg, oral, q8h PRN, Amy Flaherty PA-C    insulin glargine (Lantus) injection 15 Units, 15 Units, subcutaneous, q24h, Jose mAos PA-C, 15 Units at 02/20/25 2350    insulin lispro injection 0-15 Units, 0-15 Units, subcutaneous, Before meals & nightly, Jose Amos PA-C, 6 Units at 02/21/25 0859    ipratropium-albuteroL (Duo-Neb) 0.5-2.5 mg/3 mL nebulizer solution 3 mL, 3 mL, nebulization, q6h PRN, Amy Flaherty PA-C    morphine injection 2 mg, 2 mg, intravenous, q4h PRN, Amy Flaherty PA-C    norepinephrine (Levophed) 8 mg in dextrose 5% 250 mL (0.032 mg/mL) infusion (premix), 0-0.5 mcg/kg/min, intravenous, Continuous, Amy Flaherty PA-C, Last Rate: 6.08 mL/hr at 02/21/25 0953, 0.06 mcg/kg/min at 02/21/25 0953    PARoxetine (Paxil) tablet 60 mg, 60 mg, oral, Daily, Amy Flaherty PA-C, 60 mg at 02/21/25 0859    polyethylene glycol (Glycolax, Miralax) packet 17 g, 17 g, oral, Daily PRN, Amy Flaherty PA-C    prochlorperazine (Compazine) injection 5 mg, 5 mg, intravenous, q6h PRN, Amy Flaherty PA-C    traZODone (Desyrel) tablet 200 mg, 200 mg, oral, Nightly, Amy Flaherty PA-C, 200 mg at 02/20/25 2013    Physical Exam:    Heart Rate:  [73-99]   Temp:  [35.9 °C (96.6 °F)-36.9 °C (98.4 °F)]   Resp:  [0-30]   BP: ()/(46-81)   Weight:  [55.1 kg (121 lb 7.6 oz)-56.7 kg (125 lb)]   SpO2:  [93 %-100 %]     Physical Exam  Constitutional:       Appearance: Normal appearance.    HENT:      Head: Normocephalic and atraumatic.      Mouth/Throat:      Mouth: Mucous membranes are moist.   Eyes:      Pupils: Pupils are equal, round, and reactive to light.   Cardiovascular:      Rate and Rhythm: Normal rate and regular rhythm.      Pulses: Normal pulses.      Heart sounds: Normal heart sounds.   Pulmonary:      Effort: Pulmonary effort is normal.      Breath sounds: Normal breath sounds.   Abdominal:      General: Abdomen is flat. There is no distension.      Palpations: Abdomen is soft.      Tenderness: There is no abdominal tenderness.   Musculoskeletal:         General: No swelling.   Skin:     General: Skin is warm.      Capillary Refill: Capillary refill takes less than 2 seconds.   Neurological:      Mental Status: She is alert.   Psychiatric:         Mood and Affect: Mood normal.         Review of Systems:  14 point review of systems was completed and negative except for those specially mention in my HPI    I have reviewed all medications, laboratory results, and imaging pertinent for today's encounter.           Intake/Output Summary (Last 24 hours) at 2/21/2025 1003  Last data filed at 2/21/2025 1000  Gross per 24 hour   Intake 2122.82 ml   Output 560 ml   Net 1562.82 ml            Assessment/Plan:    I am currently managing this critically ill patient for the following problems:    Neuro/Psych/Pain Ctrl/Sedation:  Acute pain/Chest pain  Major Depressive disorder/Anxiety  Lumbar Spondylosis   - CAM ICU score q-shift  - Sleep/wake cycle hygiene  - Delirium precaution   - Pain Management: Tylenol, Morphine for chest pain  - Q4 Neuro assessments  - Continue home trazodone at night and daily paxil     Respiratory/ENT:  Positive flu a  CXR 2/19: Perihilar infiltrates bilaterally, R>L,  - Continuous Pulse oximetry  - Wean Fio2 to maintain Spo2>92%  - Bronchial hygiene and IS  - Stopped tamiflu as patient was outside of treatment window based on her reported time of initial symptoms       Cardiovascular:  Stress Cardiomyopathy  NSTEMI  Hyperlipidemia   No previous echo on file  - Continuous Telemetry  - Systolic goals >90 and < 160, currently on levo will wean off  - 12 leads prn for any new cp  - Echo : EF 30-35%  - Cardiology following will appreciate recs  -- [facility] 2/20: WNL  -- Dbobutaminbe abnd heparin gtt off- Trop 900 -> 1700     Renal/Volume Status (Intra & Extravascular):  Elevated anion gap metabolic acidosis   - Maintain urine output 0.5-1.0cc/kg/hr  - IVF per dka protocol   - Monitor I/O's  - Replete electrolytes to maintain K >4.0 and Mg >2.0  - Q4 RFP and mags  - I/O: +1.2L      GI:  - Diet: diabetic  - BR: miralax prn     Infectious Disease:  Influenza A positive  - Daily CBC  - WBC: 10     Heme/Onc:  - DVT Prophylaxis: SCDs and heparin gtt  - Transfuse for hemoglobin < 7     Endocrine:  Diabetic ketoacidosis w/out coma  DM  - 15u lantus nightly increased to 20u  - ISS, POCT q4h to ensure stable   - HGB A1C 11.2  - TSH normal     OBGYN/Ortho:  - PT/OT     Ethics/Code Status:  Full Code     Lab Draw Frequency:  Q4 rfps     :  DVT Prophylaxis: SCDs, lovenox  GI Prophylaxis: None  Bowel Regimen: Miralax prn  Diet: Diabetic  CVC: None  [place]: None  Tate: None  Restraints: None  Dispo: ICU    Critical Care Time:  60 minutes spent in preparing to see patient (I.e.labs,imaging, etc.), documentation, discussion plan of care with patient/family/caregiver, and/ or coordination of care with multidisciplinary team including the attending. Time does not include completion of procedure time.     Amy Flaherty PA-C  Pulmonology & Critical Care Medicine   [facility]

## 2025-02-22 LAB
ALBUMIN SERPL BCP-MCNC: 2.7 G/DL (ref 3.4–5)
ANION GAP BLDV CALCULATED.4IONS-SCNC: 8 MMOL/L (ref 10–25)
ANION GAP SERPL CALCULATED.3IONS-SCNC: 10 MMOL/L (ref 10–20)
ATRIAL RATE: 77 BPM
BASE EXCESS BLDV CALC-SCNC: 3.1 MMOL/L (ref -2–3)
BASOPHILS # BLD AUTO: 0.02 X10*3/UL (ref 0–0.1)
BASOPHILS NFR BLD AUTO: 0.3 %
BODY TEMPERATURE: 37 DEGREES CELSIUS
BUN SERPL-MCNC: 7 MG/DL (ref 6–23)
CA-I BLD-SCNC: 1.03 MMOL/L (ref 1.1–1.33)
CA-I BLDV-SCNC: 1.16 MMOL/L (ref 1.1–1.33)
CALCIUM SERPL-MCNC: 8 MG/DL (ref 8.6–10.3)
CARDIAC TROPONIN I PNL SERPL HS: 341 NG/L (ref 0–13)
CHLORIDE BLDV-SCNC: 101 MMOL/L (ref 98–107)
CHLORIDE SERPL-SCNC: 104 MMOL/L (ref 98–107)
CO2 SERPL-SCNC: 26 MMOL/L (ref 21–32)
CREAT SERPL-MCNC: 0.59 MG/DL (ref 0.5–1.05)
EGFRCR SERPLBLD CKD-EPI 2021: >90 ML/MIN/1.73M*2
EOSINOPHIL # BLD AUTO: 0.04 X10*3/UL (ref 0–0.7)
EOSINOPHIL NFR BLD AUTO: 0.6 %
ERYTHROCYTE [DISTWIDTH] IN BLOOD BY AUTOMATED COUNT: 13.2 % (ref 11.5–14.5)
GLUCOSE BLD MANUAL STRIP-MCNC: 143 MG/DL (ref 74–99)
GLUCOSE BLD MANUAL STRIP-MCNC: 166 MG/DL (ref 74–99)
GLUCOSE BLD MANUAL STRIP-MCNC: 185 MG/DL (ref 74–99)
GLUCOSE BLD MANUAL STRIP-MCNC: 226 MG/DL (ref 74–99)
GLUCOSE BLD MANUAL STRIP-MCNC: 226 MG/DL (ref 74–99)
GLUCOSE BLD MANUAL STRIP-MCNC: 232 MG/DL (ref 74–99)
GLUCOSE BLDV-MCNC: 195 MG/DL (ref 74–99)
GLUCOSE SERPL-MCNC: 160 MG/DL (ref 74–99)
HCO3 BLDV-SCNC: 27 MMOL/L (ref 22–26)
HCT VFR BLD AUTO: 37.6 % (ref 36–46)
HCT VFR BLD EST: 39 % (ref 36–46)
HGB BLD-MCNC: 12.5 G/DL (ref 12–16)
HGB BLDV-MCNC: 13.1 G/DL (ref 12–16)
IMM GRANULOCYTES # BLD AUTO: 0.02 X10*3/UL (ref 0–0.7)
IMM GRANULOCYTES NFR BLD AUTO: 0.3 % (ref 0–0.9)
INHALED O2 CONCENTRATION: 21 %
LACTATE BLDV-SCNC: 1 MMOL/L (ref 0.4–2)
LYMPHOCYTES # BLD AUTO: 2.96 X10*3/UL (ref 1.2–4.8)
LYMPHOCYTES NFR BLD AUTO: 45 %
MAGNESIUM SERPL-MCNC: 1.59 MG/DL (ref 1.6–2.4)
MCH RBC QN AUTO: 27.8 PG (ref 26–34)
MCHC RBC AUTO-ENTMCNC: 33.2 G/DL (ref 32–36)
MCV RBC AUTO: 84 FL (ref 80–100)
MONOCYTES # BLD AUTO: 0.5 X10*3/UL (ref 0.1–1)
MONOCYTES NFR BLD AUTO: 7.6 %
NEUTROPHILS # BLD AUTO: 3.04 X10*3/UL (ref 1.2–7.7)
NEUTROPHILS NFR BLD AUTO: 46.2 %
NRBC BLD-RTO: 0 /100 WBCS (ref 0–0)
OXYHGB MFR BLDV: 89.6 % (ref 45–75)
P AXIS: 68 DEGREES
P OFFSET: 209 MS
P ONSET: 157 MS
PCO2 BLDV: 38 MM HG (ref 41–51)
PH BLDV: 7.46 PH (ref 7.33–7.43)
PHOSPHATE SERPL-MCNC: 3.7 MG/DL (ref 2.5–4.9)
PLATELET # BLD AUTO: 227 X10*3/UL (ref 150–450)
PO2 BLDV: 57 MM HG (ref 35–45)
POTASSIUM BLDV-SCNC: 4.4 MMOL/L (ref 3.5–5.3)
POTASSIUM SERPL-SCNC: 4.4 MMOL/L (ref 3.5–5.3)
PR INTERVAL: 128 MS
Q ONSET: 221 MS
QRS COUNT: 12 BEATS
QRS DURATION: 92 MS
QT INTERVAL: 494 MS
QTC CALCULATION(BAZETT): 559 MS
QTC FREDERICIA: 536 MS
R AXIS: 7 DEGREES
RBC # BLD AUTO: 4.49 X10*6/UL (ref 4–5.2)
SAO2 % BLDV: 90 % (ref 45–75)
SODIUM BLDV-SCNC: 132 MMOL/L (ref 136–145)
SODIUM SERPL-SCNC: 136 MMOL/L (ref 136–145)
T AXIS: 109 DEGREES
T OFFSET: 468 MS
VENTRICULAR RATE: 77 BPM
WBC # BLD AUTO: 6.6 X10*3/UL (ref 4.4–11.3)

## 2025-02-22 PROCEDURE — 84484 ASSAY OF TROPONIN QUANT: CPT

## 2025-02-22 PROCEDURE — 82947 ASSAY GLUCOSE BLOOD QUANT: CPT

## 2025-02-22 PROCEDURE — 2500000001 HC RX 250 WO HCPCS SELF ADMINISTERED DRUGS (ALT 637 FOR MEDICARE OP)

## 2025-02-22 PROCEDURE — 85025 COMPLETE CBC W/AUTO DIFF WBC: CPT

## 2025-02-22 PROCEDURE — 80069 RENAL FUNCTION PANEL: CPT

## 2025-02-22 PROCEDURE — 99291 CRITICAL CARE FIRST HOUR: CPT

## 2025-02-22 PROCEDURE — 2500000001 HC RX 250 WO HCPCS SELF ADMINISTERED DRUGS (ALT 637 FOR MEDICARE OP): Performed by: INTERNAL MEDICINE

## 2025-02-22 PROCEDURE — 99233 SBSQ HOSP IP/OBS HIGH 50: CPT | Performed by: INTERNAL MEDICINE

## 2025-02-22 PROCEDURE — 82330 ASSAY OF CALCIUM: CPT

## 2025-02-22 PROCEDURE — 84295 ASSAY OF SERUM SODIUM: CPT

## 2025-02-22 PROCEDURE — 2500000002 HC RX 250 W HCPCS SELF ADMINISTERED DRUGS (ALT 637 FOR MEDICARE OP, ALT 636 FOR OP/ED)

## 2025-02-22 PROCEDURE — 2500000004 HC RX 250 GENERAL PHARMACY W/ HCPCS (ALT 636 FOR OP/ED)

## 2025-02-22 PROCEDURE — 2020000001 HC ICU ROOM DAILY

## 2025-02-22 PROCEDURE — 36415 COLL VENOUS BLD VENIPUNCTURE: CPT

## 2025-02-22 PROCEDURE — 83735 ASSAY OF MAGNESIUM: CPT

## 2025-02-22 PROCEDURE — 84132 ASSAY OF SERUM POTASSIUM: CPT

## 2025-02-22 RX ORDER — INSULIN GLARGINE 100 [IU]/ML
25 INJECTION, SOLUTION SUBCUTANEOUS EVERY 24 HOURS
Status: DISCONTINUED | OUTPATIENT
Start: 2025-02-22 | End: 2025-02-25

## 2025-02-22 RX ORDER — INSULIN LISPRO 100 [IU]/ML
0-15 INJECTION, SOLUTION INTRAVENOUS; SUBCUTANEOUS
Status: DISCONTINUED | OUTPATIENT
Start: 2025-02-23 | End: 2025-02-24

## 2025-02-22 RX ORDER — MIDODRINE HYDROCHLORIDE 10 MG/1
10 TABLET ORAL
Status: DISCONTINUED | OUTPATIENT
Start: 2025-02-22 | End: 2025-02-24

## 2025-02-22 RX ORDER — MAGNESIUM SULFATE HEPTAHYDRATE 40 MG/ML
2 INJECTION, SOLUTION INTRAVENOUS ONCE
Status: COMPLETED | OUTPATIENT
Start: 2025-02-22 | End: 2025-02-22

## 2025-02-22 RX ORDER — BENZONATATE 100 MG/1
100 CAPSULE ORAL 3 TIMES DAILY PRN
Status: DISCONTINUED | OUTPATIENT
Start: 2025-02-22 | End: 2025-02-27 | Stop reason: HOSPADM

## 2025-02-22 RX ORDER — CALCIUM GLUCONATE 20 MG/ML
2 INJECTION, SOLUTION INTRAVENOUS ONCE
Status: COMPLETED | OUTPATIENT
Start: 2025-02-22 | End: 2025-02-22

## 2025-02-22 RX ORDER — MAGNESIUM SULFATE HEPTAHYDRATE 40 MG/ML
2 INJECTION, SOLUTION INTRAVENOUS ONCE
Status: DISCONTINUED | OUTPATIENT
Start: 2025-02-22 | End: 2025-02-22 | Stop reason: WASHOUT

## 2025-02-22 RX ADMIN — ENOXAPARIN SODIUM 40 MG: 40 INJECTION SUBCUTANEOUS at 09:50

## 2025-02-22 RX ADMIN — PAROXETINE HYDROCHLORIDE 60 MG: 20 TABLET, FILM COATED ORAL at 09:50

## 2025-02-22 RX ADMIN — INSULIN LISPRO 6 UNITS: 100 INJECTION, SOLUTION INTRAVENOUS; SUBCUTANEOUS at 09:50

## 2025-02-22 RX ADMIN — INSULIN LISPRO 3 UNITS: 100 INJECTION, SOLUTION INTRAVENOUS; SUBCUTANEOUS at 17:03

## 2025-02-22 RX ADMIN — MAGNESIUM SULFATE IN WATER FOR 2 G: 40 INJECTION INTRAVENOUS at 06:37

## 2025-02-22 RX ADMIN — ACETAMINOPHEN 650 MG: 325 TABLET, FILM COATED ORAL at 10:04

## 2025-02-22 RX ADMIN — INSULIN GLARGINE 25 UNITS: 100 INJECTION, SOLUTION SUBCUTANEOUS at 20:30

## 2025-02-22 RX ADMIN — CALCIUM GLUCONATE 2 G: 20 INJECTION, SOLUTION INTRAVENOUS at 10:02

## 2025-02-22 RX ADMIN — MIDODRINE HYDROCHLORIDE 10 MG: 10 TABLET ORAL at 11:39

## 2025-02-22 RX ADMIN — INSULIN LISPRO 6 UNITS: 100 INJECTION, SOLUTION INTRAVENOUS; SUBCUTANEOUS at 12:30

## 2025-02-22 RX ADMIN — TRAZODONE HYDROCHLORIDE 200 MG: 100 TABLET ORAL at 20:30

## 2025-02-22 RX ADMIN — INSULIN LISPRO 6 UNITS: 100 INJECTION, SOLUTION INTRAVENOUS; SUBCUTANEOUS at 20:28

## 2025-02-22 RX ADMIN — BENZONATATE 100 MG: 100 CAPSULE ORAL at 16:54

## 2025-02-22 RX ADMIN — ASPIRIN 81 MG CHEWABLE TABLET 81 MG: 81 TABLET CHEWABLE at 09:49

## 2025-02-22 RX ADMIN — MIDODRINE HYDROCHLORIDE 10 MG: 10 TABLET ORAL at 16:54

## 2025-02-22 ASSESSMENT — PAIN - FUNCTIONAL ASSESSMENT
PAIN_FUNCTIONAL_ASSESSMENT: 0-10

## 2025-02-22 ASSESSMENT — PAIN SCALES - GENERAL
PAINLEVEL_OUTOF10: 0 - NO PAIN
PAINLEVEL_OUTOF10: 3
PAINLEVEL_OUTOF10: 0 - NO PAIN

## 2025-02-22 ASSESSMENT — PAIN DESCRIPTION - ORIENTATION: ORIENTATION: RIGHT

## 2025-02-22 ASSESSMENT — PAIN DESCRIPTION - DESCRIPTORS: DESCRIPTORS: SHARP

## 2025-02-22 ASSESSMENT — PAIN DESCRIPTION - LOCATION: LOCATION: OTHER (COMMENT)

## 2025-02-22 NOTE — PROGRESS NOTES
Critical Care Medicine       Date:  2/22/2025  Patient:  Desiree Shepard  YOB: 1960  MRN:  [id number]   Admit Date:  2/18/2025      Chief Complaint   Patient presents with    Chest Pain     Patient is currently positive for Flu A and is now experiencing chest pain that started about 2 days ago.  States it as a haed pain that spreads across her chest. Sent from urgent care for further eval         History of Present Illness:  Desiree Shepard is a 64 year old female with pmhx of recurrent major depressive disorder, DM, mixed hyperlipidemia, anxiety, and lumbar spondylosis. Patient was admitted on 2/18 with new onset chest pain, crushing in nature. Patient also complaining of malaise that has present for atleast a month. Patient denies radiation, n/v, syncope, chest palpitations or sob. Patients labs in ED concerning for hyperglycemia of 517, elevated troponin of 382 and repeat of 954, elevated beta hydroxybutyrate of 3.06 hba1c of 11.2 and positive flu a. 12 lead on admission was negative for st elevation. Patient at this point was admitted to step down with plan of left heart cath on 2/19 was started on a heparin gtt.     Am labs 2/19 showing worsening dka with elevated beta hydroxybutyrate of 11.14 glucose of 302, and vbg showing metabolic acidosis ph of 7.19 co2 of 20 bicarb of 7.6 and lactate of 1.6. Patient was transferred to ICU as cardiology wanted stabilization of patient before performing left heart cath. Follow up LHC planned for 2/20.      Interval ICU Events:  2/20: Currently on insulin gtt for DKA. On dobutamine @ 5 and levo at 0.1. On heparing gtt. Cardiology following for possible LHC today.     2/21: NAEO. LHC NL stopped dobutamine and heparin gtt. Still on 0.07 of levo will cont to wean. Off insulin gtt since 3am started on 15u lantus. So far sugars high so increased to 20u lantus and POCT q4h till sugars under control.     2/22: Patient has been off levo and insulin gtt since yesterday  afternoon. Cardiology considering starting low dose BB when BP stable. Increased lantus from 20-25u. Plan to transfer to step down. Cardiology added one midodrine 10 TID.     Objective     Past Medical History:   Diagnosis Date    Acid reflux     Age-related nuclear cataract of both eyes 6/13/2024    Diabetes mellitus (Multi)     Herpes     Hiatal hernia     High cholesterol     History of HPV infection     IBS (irritable bowel syndrome)     Insomnia     Rosacea     Unspecified disorder of refraction 6/13/2024     Past Surgical History:   Procedure Laterality Date    APPENDECTOMY  2005    CARDIAC CATHETERIZATION N/A 2/20/2025    Procedure: Left Heart Cath;  Surgeon: Navin Hughes DO;  Location: [facility] Cardiac Cath Lab;  Service: Cardiovascular;  Laterality: N/A;    COLONOSCOPY  2015    TORTUOUS COLON- REPEAT IN 5 YEARS--DR. HERNANDES    HERNIA REPAIR  01/2018    TUBAL LIGATION Bilateral 11/1991     Medications Prior to Admission   Medication Sig Dispense Refill Last Dose/Taking    chlorpheniramine/dextromethorp (CORICIDIN HBP COUGH AND COLD ORAL) Take 1 each by mouth once daily as needed.   2/18/2025    cholecalciferol (Vitamin D-3) 25 MCG (1000 UT) capsule Take 1 capsule (25 mcg) by mouth once every 24 hours.   2/18/2025    cyanocobalamin (Vitamin B-12) 1,000 mcg tablet Take 1 tablet (1,000 mcg) by mouth once daily.   2/18/2025    empagliflozin (Jardiance) 25 mg Take 1 tablet (25 mg) by mouth once daily. 90 tablet 3 2/18/2025    hydrOXYzine HCL (Atarax) 25 mg tablet TAKE TWO TABLETS BY MOUTH EVERY 8 HOURS FOR ANXIETY 270 tablet 3 2/18/2025    MILK THISTLE ORAL Take 1 each by mouth once daily.   2/18/2025    multivitamin tablet Take 1 tablet by mouth once daily.   2/18/2025    PARoxetine (Paxil) 40 mg tablet Take 1.5 tablets (60 mg) by mouth once daily. 135 tablet 3 2/18/2025    pravastatin (Pravachol) 20 mg tablet Take 1 tablet (20 mg) by mouth once daily. 90 tablet 3 2/18/2025    traZODone (Desyrel) 100 mg tablet TAKE  TWO TABLETS BY MOUTH EVERY  tablet 3 2/17/2025    Trulicity 4.5 mg/0.5 mL pen injector INJECT 4.5MG UNDER THE SKIN ONCE WEEKLY 2 mL 3 2/17/2025    TURMERIC ORAL Take 1 each by mouth once daily.   2/18/2025    benzonatate (Tessalon) 200 mg capsule Take 1 capsule (200 mg) by mouth 3 times a day as needed for cough for up to 7 days. Do not crush or chew. (Patient not taking: Reported on 2/18/2025) 30 capsule 0 Not Taking    blood-glu meter,cont-transmit misc as directed   Unknown    blood-glucose sensor (Dexcom G7 Sensor) device Change every 10 DAYS 3 each 11 Unknown    guaiFENesin (Mucinex) 1,200 mg tablet extended release 12hr Take 1 tablet (1,200 mg) by mouth every 12 hours if needed (cough) for up to 7 days. (Patient not taking: Reported on 2/18/2025) 14 tablet 0 Not Taking    zoster vaccine-recombinant adjuvanted (Shingrix) 50 mcg/0.5 mL vaccine Inject 0.5 mL into the muscle every 8 (eight) weeks. (Patient not taking: Reported on 9/5/2024) 1 mL 0      Penicillin, Statins-hmg-coa reductase inhibitors, and Sulfamethoxazole-trimethoprim  Social History     Tobacco Use    Smoking status: Never     Passive exposure: Never    Smokeless tobacco: Never   Vaping Use    Vaping status: Never Used   Substance Use Topics    Alcohol use: Not Currently    Drug use: Never     Family History   Problem Relation Name Age of Onset    Ulcerative colitis Mother      Throat cancer Father      Aneurysm Father          under his belly button - inherited    Leukemia Father      Other (abdominal aortic aneurysm) Father      Diabetes Father      Hypertension Father      Lupus Sister      Other (factor v) Sister      Blood clot Sister      Cancer Sister      Heart attack Brother  40    Testicular cancer Brother      Deep vein thrombosis Brother      Heart disease Brother      Hypertension Brother      Lupus Daughter      Ulcerative colitis Daughter      Obesity Daughter      Cancer Daughter      No Known Problems Daughter      No Known  Problems Daughter      Other (abdominal aortic aneurysm) Paternal Grandfather      Lupus Half-Sister         [facility] Medications:    norepinephrine, 0-0.5 mcg/kg/min, Last Rate: Stopped (02/21/25 1635)          Current Facility-Administered Medications:     acetaminophen (Tylenol) tablet 650 mg, 650 mg, oral, q6h PRN, Amy Flaherty PA-C    aspirin chewable tablet 81 mg, 81 mg, oral, Daily, Amy Flaherty PA-C, 81 mg at 02/21/25 0859    calcium gluconate 2 g in sodium chloride (iso)  mL, 2 g, intravenous, Once, Jose Amos PA-C    dextrose 50 % injection 12.5 g, 12.5 g, intravenous, q15 min PRN, Jose Amos PA-C    dextrose 50 % injection 25 g, 25 g, intravenous, q15 min PRN, Jose Amos PA-C    [Held by provider] empagliflozin (Jardiance) tablet 25 mg, 25 mg, oral, Daily, Amy Flaherty PA-C    enoxaparin (Lovenox) syringe 40 mg, 40 mg, subcutaneous, Daily, Amy Flaherty PA-C, 40 mg at 02/21/25 1154    glucagon (Glucagen) injection 1 mg, 1 mg, intramuscular, q15 min PRN, Jose Amos PA-C    glucagon (Glucagen) injection 1 mg, 1 mg, intramuscular, q15 min PRN, Jose Amos PA-C    hydrOXYzine HCL (Atarax) tablet 25 mg, 25 mg, oral, q8h PRN, Amy Flaherty PA-C    insulin glargine (Lantus) injection 20 Units, 20 Units, subcutaneous, q24h, Amy Flaherty PA-C    insulin lispro injection 0-15 Units, 0-15 Units, subcutaneous, q4h, Amy Flaherty PA-C, 3 Units at 02/21/25 2035    ipratropium-albuteroL (Duo-Neb) 0.5-2.5 mg/3 mL nebulizer solution 3 mL, 3 mL, nebulization, q6h PRN, Amy Flaherty PA-C    magnesium sulfate 2 g in sterile water for injection 50 mL, 2 g, intravenous, Once, Jose Amos PA-C, Last Rate: 25 mL/hr at 02/22/25 0637, 2 g at 02/22/25 0637    morphine injection 2 mg, 2 mg, intravenous, q4h PRN, Amy Flaherty PA-C    norepinephrine (Levophed) 8 mg in dextrose 5% 250 mL (0.032 mg/mL) infusion (premix), 0-0.5 mcg/kg/min, intravenous, Continuous, Amy Flaherty PA-C, Stopped  at 02/21/25 1635    PARoxetine (Paxil) tablet 60 mg, 60 mg, oral, Daily, Amy Flaherty PA-C, 60 mg at 02/21/25 0859    polyethylene glycol (Glycolax, Miralax) packet 17 g, 17 g, oral, Daily PRN, Amy Flaherty PA-C    prochlorperazine (Compazine) injection 5 mg, 5 mg, intravenous, q6h PRN, Amy Flaherty PA-C    traZODone (Desyrel) tablet 200 mg, 200 mg, oral, Nightly, Amy Flaherty PA-C, 200 mg at 02/21/25 2035    Physical Exam:    Heart Rate:  []   Temp:  [36.6 °C (97.9 °F)-37 °C (98.6 °F)]   Resp:  [14-32]   BP: ()/(44-81)   Weight:  [56.7 kg (125 lb)]   SpO2:  [91 %-99 %]     Physical Exam  Constitutional:       Appearance: Normal appearance.   HENT:      Head: Normocephalic and atraumatic.      Mouth/Throat:      Mouth: Mucous membranes are moist.   Eyes:      Pupils: Pupils are equal, round, and reactive to light.   Cardiovascular:      Rate and Rhythm: Normal rate and regular rhythm.      Pulses: Normal pulses.      Heart sounds: Normal heart sounds.   Pulmonary:      Effort: Pulmonary effort is normal.      Breath sounds: Normal breath sounds.   Abdominal:      General: Abdomen is flat. There is no distension.      Palpations: Abdomen is soft.      Tenderness: There is no abdominal tenderness.   Musculoskeletal:         General: No swelling.   Skin:     General: Skin is warm.      Capillary Refill: Capillary refill takes less than 2 seconds.   Neurological:      Mental Status: She is alert.   Psychiatric:         Mood and Affect: Mood normal.         Review of Systems:  14 point review of systems was completed and negative except for those specially mention in my HPI    I have reviewed all medications, laboratory results, and imaging pertinent for today's encounter.           Intake/Output Summary (Last 24 hours) at 2/22/2025 0743  Last data filed at 2/22/2025 0000  Gross per 24 hour   Intake 1530.84 ml   Output 550 ml   Net 980.84 ml            Assessment/Plan:    I am currently managing  this critically ill patient for the following problems:    Neuro/Psych/Pain Ctrl/Sedation:  Acute pain/Chest pain  Major Depressive disorder/Anxiety  Lumbar Spondylosis   - CAM ICU score q-shift  - Sleep/wake cycle hygiene  - Delirium precaution   - Pain Management: Tylenol, Morphine for chest pain  - Q4 Neuro assessments  - Continue home trazodone at night and daily paxil     Respiratory/ENT:  Positive flu a  CXR 2/19: Perihilar infiltrates bilaterally, R>L,  - Continuous Pulse oximetry  - Wean Fio2 to maintain Spo2>92%  - Bronchial hygiene and IS  - Stopped tamiflu as patient was outside of treatment window based on her reported time of initial symptoms      Cardiovascular:  Stress Cardiomyopathy  NSTEMI  Hyperlipidemia   No previous echo on file  - Continuous Telemetry  - Systolic goals >90 and < 160  - 12 leads prn for any new cp  - Echo : EF 30-35%  - Cardiology following will appreciate recs  -- [facility] 2/20: WNL  -- Midodrine 10 TID  -- Dbobutaminbe abnd heparin gtt off- Trop 900 -> 1700 -> 341     Renal/Volume Status (Intra & Extravascular):  Elevated anion gap metabolic acidosis   - Maintain urine output 0.5-1.0cc/kg/hr  - IVF per dka protocol   - Monitor I/O's  - Replete electrolytes to maintain K >4.0 and Mg >2.0  - Q4 RFP and mags  - I/O: +1L      GI:  - Diet: diabetic  - BR: miralax prn     Infectious Disease:  Influenza A positive  - Daily CBC  - WBC: 6.6     Heme/Onc:  - DVT Prophylaxis: SCDs, lovenox  - Transfuse for hemoglobin < 7     Endocrine:  Diabetic ketoacidosis w/out coma  DM  - 20u lantus nightly increase to 25u   - ISS, POCT q4h to ensure stable   - HGB A1C 11.2  - TSH normal     OBGYN/Ortho:  - PT/OT     Ethics/Code Status:  Full Code     Lab Draw Frequency:  Daily     :  DVT Prophylaxis: SCDs, lovenox  GI Prophylaxis: None  Bowel Regimen: Miralax prn  Diet: Diabetic  CVC: None  [place]: None  [place]: None  Restraints: None  Dispo: Transfer to step down    Critical Care Time:  60  minutes spent in preparing to see patient (I.e.labs,imaging, etc.), documentation, discussion plan of care with patient/family/caregiver, and/ or coordination of care with multidisciplinary team including the attending. Time does not include completion of procedure time.     Amy Flaherty PA-C  Pulmonology & Critical Care Medicine   [facility]

## 2025-02-22 NOTE — PROGRESS NOTES
Subjective Data:  Patient is feeling okay.  Just feeling tired overall.  Denies having chest pain.  No shortness of breath.    Overnight Events:    Telemetry overnight reviewed no events     Objective Data:  Last Recorded Vitals:  Vitals:    02/22/25 0757 02/22/25 0800 02/22/25 0805 02/22/25 0900   BP: 92/59 85/54 84/52 84/56   BP Location:       Patient Position:       Pulse: 71 71 71 73   Resp: 20 21 18 19   Temp:       TempSrc:       SpO2: 95% 95% 94% 93%   Weight:       Height:           Last Labs:  CBC - 2/22/2025:  4:58 AM  6.6 12.5 227    37.6      CMP - 2/22/2025:  4:58 AM  8.0 6.3 34 --- 0.4   3.7 2.7 22 87      PTT - 2/20/2025:  4:47 AM  _   _ 44.0     TROPHS   Date/Time Value Ref Range Status   02/20/2025 07:28 AM 1,788 0 - 13 ng/L Final   02/18/2025 11:14  0 - 13 ng/L Final     Comment:     Previous result verified on 2/18/2025 1050 on specimen/case [id number] called with component [facility] for procedure Troponin I, High Sensitivity, Initial with value 382 ng/L.   02/18/2025 10:05  0 - 13 ng/L Final     BNP   Date/Time Value Ref Range Status   02/18/2025 12:35 PM 90 0 - 99 pg/mL Final     HGBA1C   Date/Time Value Ref Range Status   02/18/2025 12:35 PM 11.2 See comment % Final   12/05/2024 04:01 PM 7.2 4.2 - 6.5 % Final   09/05/2024 04:13 PM 7.4 4.2 - 6.5 % Final     LDLCALC   Date/Time Value Ref Range Status   02/19/2025 04:53 AM   Final     Comment:     The calculation of LDL and VLDL are inaccurate when the Triglycerides are greater than 400 mg/dL or when the patient is non-fasting. If LDL measurement is necessary contact the testing laboratory for an alternative LDL assay.                                  Near   Borderline      AGE      Desirable  Optimal    High     High     Very High     0-19 Y     0 - 109     ---    110-129   >/= 130     ----    20-24 Y     0 - 119     ---    120-159   >/= 160     ----      >24 Y     0 -  99   100-129  130-159   160-189     >/=190     10/18/2024 10:40   <=99 mg/dL Final     Comment:                                 Near   Borderline      AGE      Desirable  Optimal    High     High     Very High     0-19 Y     0 - 109     ---    110-129   >/= 130     ----    20-24 Y     0 - 119     ---    120-159   >/= 160     ----      >24 Y     0 -  99   100-129  130-159   160-189     >/=190     09/19/2023 09:00  65 - 130 MG/DL Final   09/21/2022 02:13 PM 97 65 - 130 MG/DL Final   03/25/2021 04:24  65 - 130 MG/DL Final     VLDL   Date/Time Value Ref Range Status   02/19/2025 04:53 AM   Final     Comment:     Unable to calculate VLDL.   10/18/2024 10:40 AM 21 0 - 40 mg/dL Final      Last I/O:  I/O last 3 completed shifts:  In: 3018 (53.2 mL/kg) [P.O.:930; I.V.:1438 (25.4 mL/kg); IV Piggyback:650]  Out: 550 (9.7 mL/kg) [Urine:550 (0.3 mL/kg/hr)]  Weight: 56.7 kg     Past Cardiology Tests (Last 3 Years):  EKG:  Electrocardiogram 12 Lead 02/20/2025 (Preliminary)      ECG 12 Lead 02/20/2025      ECG 12 lead 02/20/2025      ECG 12 Lead       ECG 12 lead 02/18/2025      ECG 12 lead 02/18/2025      ECG 12 lead (Clinic Performed)     Echo:  Transthoracic Echo (TTE) Complete 02/18/2025    Ejection Fractions:  EF   Date/Time Value Ref Range Status   02/18/2025 03:41 PM 33 %      Cath:  Cardiac Catheterization Procedure 02/20/2025    Stress Test:  No results found for this or any previous visit from the past 1095 days.    Cardiac Imaging:  No results found for this or any previous visit from the past 1095 days.      Inpatient Medications:  Scheduled medications   Medication Dose Route Frequency    aspirin  81 mg oral Daily    calcium gluconate  2 g intravenous Once    [Held by provider] empagliflozin  25 mg oral Daily    enoxaparin  40 mg subcutaneous Daily    insulin glargine  20 Units subcutaneous q24h    insulin lispro  0-15 Units subcutaneous q4h    midodrine  10 mg oral TID    PARoxetine  60 mg oral Daily    traZODone  200 mg oral Nightly     PRN medications    Medication    acetaminophen    dextrose    dextrose    glucagon    glucagon    hydrOXYzine HCL    ipratropium-albuteroL    morphine    polyethylene glycol    prochlorperazine     Continuous Medications   Medication Dose Last Rate    norepinephrine  0-0.5 mcg/kg/min Stopped (02/21/25 1635)       Physical Exam:  General: Patient is in no acute distress.  HEENT: atraumatic normocephalic.  Neck: is supple jugular venous pressure within normal limits no thyromegaly.  Cardiovascular regular rate and rhythm normal heart sounds no murmurs rubs or gallops.  Lungs: clear to auscultation bilaterally.  Abdomen: is soft nontender.  Extremities warm to touch no edema.           Assessment/Plan  #1. acute non-ST elevation myocardial infarction.  Echocardiogram showed apical and anterior wall severe hypokinesis.  Cardiac catheterization showed no significant coronary disease with LINDA-3 flow.  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  Troponin elevated.  She may have myocarditis.  No indication for aspirin or NSAIDs.  Occasions for steroids.  No significant arrhythmias.  We will monitor for now.  She is weaning off pressors.  Keep maps above 65 mmHg.  Patient renal function stable.  No indication for heparin drip.  Will monitor.  Patient is not in cardiogenic shock.  I will hold off beta-blockers for today since her blood pressure remains on the low side.  Will start her on midodrine.  May give her IV fluids again since her LVEDP was low.     2. Influenza A infection management by primary team.     3.  Hypotension.  Patient has underlying history of blood pressure running on the low side.  I will start on midodrine 10 mg oral daily.  Wean her off Levophed if she is on any.  Her renal function stable.  Monitor renal function and urine output.    Peripheral IV 02/19/25 20 G Left;Anterior Forearm (Active)   Site Assessment Clean;Dry;Intact 02/22/25 0400   Dressing Type Transparent 02/22/25 0400    Line Status Flushed 02/22/25 0400   Dressing Status Clean;Dry;Occlusive 02/22/25 0400   Number of days: 3       Peripheral IV 02/20/25 20 G Left;Proximal;Anterior;Upper Forearm (Active)   Site Assessment Clean;Dry;Intact 02/22/25 0400   Dressing Type Transparent 02/22/25 0400   Line Status Flushed 02/22/25 0400   Dressing Status Clean;Dry;Occlusive 02/22/25 0400   Number of days: 2       Code Status:  Full Code      Freya June MD

## 2025-02-22 NOTE — CARE PLAN
The patient's goals for the shift include  get rest and go home    The clinical goals for the shift include remain off pressors and stay stable      Problem: Pain - Adult  Goal: Verbalizes/displays adequate comfort level or baseline comfort level  Outcome: Progressing     Problem: Safety - Adult  Goal: Free from fall injury  Outcome: Progressing     Problem: Discharge Planning  Goal: Discharge to home or other facility with appropriate resources  Outcome: Progressing     Problem: Chronic Conditions and Co-morbidities  Goal: Patient's chronic conditions and co-morbidity symptoms are monitored and maintained or improved  Outcome: Progressing     Problem: Nutrition  Goal: Nutrient intake appropriate for maintaining nutritional needs  Outcome: Progressing     Problem: Diabetes  Goal: Achieve decreasing blood glucose levels by end of shift  Outcome: Progressing  Goal: Increase stability of blood glucose readings by end of shift  Outcome: Progressing  Goal: Decrease in ketones present in urine by end of shift  Outcome: Progressing  Goal: Maintain electrolyte levels within acceptable range throughout shift  Outcome: Progressing  Goal: Maintain glucose levels >70mg/dl to <250mg/dl throughout shift  Outcome: Progressing  Goal: No changes in neurological exam by end of shift  Outcome: Progressing  Goal: Learn about and adhere to nutrition recommendations by end of shift  Outcome: Progressing  Goal: Vital signs within normal range for age by end of shift  Outcome: Progressing  Goal: Increase self care and/or family involovement by end of shift  Outcome: Progressing  Goal: Receive DSME education by end of shift  Outcome: Progressing     Problem: Skin  Goal: Participates in plan/prevention/treatment measures  Outcome: Progressing  Goal: Prevent/manage excess moisture  Outcome: Progressing  Goal: Prevent/minimize sheer/friction injuries  Outcome: Progressing  Goal: Promote/optimize nutrition  Outcome: Progressing     Problem:  Fall/Injury  Goal: Not fall by end of shift  Outcome: Progressing  Goal: Be free from injury by end of the shift  Outcome: Progressing  Goal: Verbalize understanding of personal risk factors for fall in the hospital  Outcome: Progressing  Goal: Verbalize understanding of risk factor reduction measures to prevent injury from fall in the home  Outcome: Progressing

## 2025-02-22 NOTE — CARE PLAN
The patient's goals for the shift include feel better    The clinical goals for the shift include remain hemodynamically stable    Over the shift, the patient made progress toward the following goals:      Problem: Pain - Adult  Goal: Verbalizes/displays adequate comfort level or baseline comfort level  Outcome: Progressing  Flowsheets (Taken 2/22/2025 1741)  Verbalizes/displays adequate comfort level or baseline comfort level:   Encourage patient to monitor pain and request assistance   Assess pain using appropriate pain scale   Administer analgesics based on type and severity of pain and evaluate response   Implement non-pharmacological measures as appropriate and evaluate response   Consider cultural and social influences on pain and pain management   Notify Licensed Independent Practitioner if interventions unsuccessful or patient reports new pain     Problem: Safety - Adult  Goal: Free from fall injury  Outcome: Progressing  Flowsheets (Taken 2/22/2025 1741)  Free from fall injury: Instruct family/caregiver on patient safety     Problem: Discharge Planning  Goal: Discharge to home or other facility with appropriate resources  Outcome: Progressing  Flowsheets (Taken 2/22/2025 1741)  Discharge to home or other facility with appropriate resources:   Identify barriers to discharge with patient and caregiver   Identify discharge learning needs (meds, wound care, etc)     Problem: Chronic Conditions and Co-morbidities  Goal: Patient's chronic conditions and co-morbidity symptoms are monitored and maintained or improved  Outcome: Progressing  Flowsheets (Taken 2/22/2025 1741)  Care Plan - Patient's Chronic Conditions and Co-Morbidity Symptoms are Monitored and Maintained or Improved:   Monitor and assess patient's chronic conditions and comorbid symptoms for stability, deterioration, or improvement   Collaborate with multidisciplinary team to address chronic and comorbid conditions and prevent exacerbation or  deterioration   Update acute care plan with appropriate goals if chronic or comorbid symptoms are exacerbated and prevent overall improvement and discharge     Problem: Nutrition  Goal: Nutrient intake appropriate for maintaining nutritional needs  Outcome: Progressing     Problem: Diabetes  Goal: Achieve decreasing blood glucose levels by end of shift  Outcome: Progressing  Flowsheets (Taken 2/22/2025 1741)  Achieve decreasing blood glucose levels by end of shift: Med administration/monitoring of effect  Goal: Increase stability of blood glucose readings by end of shift  Outcome: Progressing  Flowsheets (Taken 2/22/2025 1741)  Increase stability of blood glucose readings by end of shift: Med administration/monitoring of effect  Goal: Maintain electrolyte levels within acceptable range throughout shift  Outcome: Progressing  Flowsheets (Taken 2/22/2025 1741)  Maintain electrolyte levels within acceptable range throughout shift:   Med administration/monitoring of effect   Monitor urine output  Goal: Maintain glucose levels >70mg/dl to <250mg/dl throughout shift  Outcome: Progressing  Flowsheets (Taken 2/22/2025 1741)  Maintain glucose levels >70mg/dl to <250mg/dl throughout shift: Med administration/monitoring of effect  Goal: No changes in neurological exam by end of shift  Outcome: Progressing  Flowsheets (Taken 2/22/2025 1741)  No changes in neurological exam by end of shift: Complete frequent neurological assessments  Goal: Learn about and adhere to nutrition recommendations by end of shift  Outcome: Progressing  Flowsheets (Taken 2/22/2025 1741)  Learn about and adhere to nutrition recommendations by end of shift: Ensure/encourage compliance with appropriate diet  Goal: Vital signs within normal range for age by end of shift  Outcome: Progressing  Flowsheets (Taken 2/22/2025 1741)  Vital signs within normal range for age by end of shift: Med administration/monitoring of effect  Goal: Increase self care and/or  family involovement by end of shift  Outcome: Progressing  Goal: Receive DSME education by end of shift  Outcome: Progressing  Flowsheets (Taken 2/22/2025 1741)  Receive DSME education by end of shift: Provide patient centered education on Diabetic Self Management Education     Problem: Skin  Goal: Participates in plan/prevention/treatment measures  Outcome: Progressing  Flowsheets (Taken 2/22/2025 1741)  Participates in plan/prevention/treatment measures:   Discuss with provider PT/OT consult   Elevate heels   Increase activity/out of bed for meals  Goal: Prevent/manage excess moisture  Outcome: Progressing  Flowsheets (Taken 2/22/2025 1741)  Prevent/manage excess moisture: Moisturize dry skin  Goal: Prevent/minimize sheer/friction injuries  Outcome: Progressing  Flowsheets (Taken 2/22/2025 1741)  Prevent/minimize sheer/friction injuries:   Complete micro-shifts as needed if patient unable. Adjust patient position to relieve pressure points, not a full turn   HOB 30 degrees or less   Increase activity/out of bed for meals   Turn/reposition every 2 hours/use positioning/transfer devices   Use pull sheet   Utilize specialty bed per algorithm  Goal: Promote/optimize nutrition  Outcome: Progressing  Flowsheets (Taken 2/22/2025 1741)  Promote/optimize nutrition:   Offer water/supplements/favorite foods   Monitor/record intake including meals     Problem: Fall/Injury  Goal: Not fall by end of shift  Outcome: Progressing  Goal: Be free from injury by end of the shift  Outcome: Progressing  Goal: Verbalize understanding of personal risk factors for fall in the hospital  Outcome: Progressing  Goal: Verbalize understanding of risk factor reduction measures to prevent injury from fall in the home  Outcome: Progressing     Problem: Pain  Goal: Takes deep breaths with improved pain control throughout the shift  Outcome: Progressing  Goal: Turns in bed with improved pain control throughout the shift  Outcome: Progressing  Goal:  Walks with improved pain control throughout the shift  Outcome: Progressing  Goal: Performs ADL's with improved pain control throughout shift  Outcome: Progressing  Goal: Participates in PT with improved pain control throughout the shift  Outcome: Progressing

## 2025-02-23 VITALS
DIASTOLIC BLOOD PRESSURE: 53 MMHG | TEMPERATURE: 98.3 F | HEIGHT: 61 IN | OXYGEN SATURATION: 95 % | HEART RATE: 106 BPM | BODY MASS INDEX: 23.18 KG/M2 | WEIGHT: 122.8 LBS | RESPIRATION RATE: 16 BRPM | SYSTOLIC BLOOD PRESSURE: 93 MMHG

## 2025-02-23 LAB
ALBUMIN SERPL BCP-MCNC: 2.8 G/DL (ref 3.4–5)
ANION GAP SERPL CALCULATED.3IONS-SCNC: 9 MMOL/L (ref 10–20)
BASOPHILS # BLD AUTO: 0.02 X10*3/UL (ref 0–0.1)
BASOPHILS NFR BLD AUTO: 0.3 %
BUN SERPL-MCNC: 10 MG/DL (ref 6–23)
CA-I BLD-SCNC: 1.12 MMOL/L (ref 1.1–1.33)
CALCIUM SERPL-MCNC: 8.4 MG/DL (ref 8.6–10.3)
CHLORIDE SERPL-SCNC: 103 MMOL/L (ref 98–107)
CO2 SERPL-SCNC: 31 MMOL/L (ref 21–32)
CORTIS SERPL-MCNC: 26.8 UG/DL (ref 2.5–20)
CREAT SERPL-MCNC: 0.71 MG/DL (ref 0.5–1.05)
EGFRCR SERPLBLD CKD-EPI 2021: >90 ML/MIN/1.73M*2
EOSINOPHIL # BLD AUTO: 0.05 X10*3/UL (ref 0–0.7)
EOSINOPHIL NFR BLD AUTO: 0.8 %
ERYTHROCYTE [DISTWIDTH] IN BLOOD BY AUTOMATED COUNT: 13.1 % (ref 11.5–14.5)
GLUCOSE BLD MANUAL STRIP-MCNC: 101 MG/DL (ref 74–99)
GLUCOSE BLD MANUAL STRIP-MCNC: 165 MG/DL (ref 74–99)
GLUCOSE BLD MANUAL STRIP-MCNC: 175 MG/DL (ref 74–99)
GLUCOSE BLD MANUAL STRIP-MCNC: 250 MG/DL (ref 74–99)
GLUCOSE SERPL-MCNC: 87 MG/DL (ref 74–99)
HCT VFR BLD AUTO: 36.3 % (ref 36–46)
HGB BLD-MCNC: 12.3 G/DL (ref 12–16)
IMM GRANULOCYTES # BLD AUTO: 0.02 X10*3/UL (ref 0–0.7)
IMM GRANULOCYTES NFR BLD AUTO: 0.3 % (ref 0–0.9)
LYMPHOCYTES # BLD AUTO: 2.83 X10*3/UL (ref 1.2–4.8)
LYMPHOCYTES NFR BLD AUTO: 45.1 %
MAGNESIUM SERPL-MCNC: 1.6 MG/DL (ref 1.6–2.4)
MCH RBC QN AUTO: 28.7 PG (ref 26–34)
MCHC RBC AUTO-ENTMCNC: 33.9 G/DL (ref 32–36)
MCV RBC AUTO: 85 FL (ref 80–100)
MONOCYTES # BLD AUTO: 0.8 X10*3/UL (ref 0.1–1)
MONOCYTES NFR BLD AUTO: 12.8 %
NEUTROPHILS # BLD AUTO: 2.55 X10*3/UL (ref 1.2–7.7)
NEUTROPHILS NFR BLD AUTO: 40.7 %
NRBC BLD-RTO: 0 /100 WBCS (ref 0–0)
PHOSPHATE SERPL-MCNC: 4.4 MG/DL (ref 2.5–4.9)
PLATELET # BLD AUTO: 282 X10*3/UL (ref 150–450)
POTASSIUM SERPL-SCNC: 4.1 MMOL/L (ref 3.5–5.3)
RBC # BLD AUTO: 4.29 X10*6/UL (ref 4–5.2)
SODIUM SERPL-SCNC: 139 MMOL/L (ref 136–145)
WBC # BLD AUTO: 6.3 X10*3/UL (ref 4.4–11.3)

## 2025-02-23 PROCEDURE — 99232 SBSQ HOSP IP/OBS MODERATE 35: CPT | Performed by: INTERNAL MEDICINE

## 2025-02-23 PROCEDURE — 82947 ASSAY GLUCOSE BLOOD QUANT: CPT

## 2025-02-23 PROCEDURE — 2500000002 HC RX 250 W HCPCS SELF ADMINISTERED DRUGS (ALT 637 FOR MEDICARE OP, ALT 636 FOR OP/ED)

## 2025-02-23 PROCEDURE — 36415 COLL VENOUS BLD VENIPUNCTURE: CPT

## 2025-02-23 PROCEDURE — 2500000001 HC RX 250 WO HCPCS SELF ADMINISTERED DRUGS (ALT 637 FOR MEDICARE OP): Performed by: INTERNAL MEDICINE

## 2025-02-23 PROCEDURE — 85025 COMPLETE CBC W/AUTO DIFF WBC: CPT

## 2025-02-23 PROCEDURE — 83735 ASSAY OF MAGNESIUM: CPT

## 2025-02-23 PROCEDURE — 80069 RENAL FUNCTION PANEL: CPT

## 2025-02-23 PROCEDURE — 99291 CRITICAL CARE FIRST HOUR: CPT

## 2025-02-23 PROCEDURE — 2500000004 HC RX 250 GENERAL PHARMACY W/ HCPCS (ALT 636 FOR OP/ED)

## 2025-02-23 PROCEDURE — 2500000001 HC RX 250 WO HCPCS SELF ADMINISTERED DRUGS (ALT 637 FOR MEDICARE OP)

## 2025-02-23 PROCEDURE — 82533 TOTAL CORTISOL: CPT | Mod: WESLAB

## 2025-02-23 PROCEDURE — 82330 ASSAY OF CALCIUM: CPT

## 2025-02-23 PROCEDURE — 2060000001 HC INTERMEDIATE ICU ROOM DAILY

## 2025-02-23 RX ORDER — MAGNESIUM SULFATE HEPTAHYDRATE 40 MG/ML
2 INJECTION, SOLUTION INTRAVENOUS ONCE
Status: COMPLETED | OUTPATIENT
Start: 2025-02-23 | End: 2025-02-23

## 2025-02-23 RX ADMIN — ENOXAPARIN SODIUM 40 MG: 40 INJECTION SUBCUTANEOUS at 09:08

## 2025-02-23 RX ADMIN — TRAZODONE HYDROCHLORIDE 200 MG: 100 TABLET ORAL at 22:09

## 2025-02-23 RX ADMIN — PROCHLORPERAZINE EDISYLATE 5 MG: 5 INJECTION INTRAMUSCULAR; INTRAVENOUS at 09:56

## 2025-02-23 RX ADMIN — BENZONATATE 100 MG: 100 CAPSULE ORAL at 09:08

## 2025-02-23 RX ADMIN — GUAIFENESIN AND DEXTROMETHORPHAN HYDROBROMIDE 1 TABLET: 600; 30 TABLET, EXTENDED RELEASE ORAL at 13:10

## 2025-02-23 RX ADMIN — MIDODRINE HYDROCHLORIDE 10 MG: 10 TABLET ORAL at 16:25

## 2025-02-23 RX ADMIN — INSULIN LISPRO 3 UNITS: 100 INJECTION, SOLUTION INTRAVENOUS; SUBCUTANEOUS at 16:57

## 2025-02-23 RX ADMIN — PAROXETINE HYDROCHLORIDE 60 MG: 20 TABLET, FILM COATED ORAL at 09:08

## 2025-02-23 RX ADMIN — MIDODRINE HYDROCHLORIDE 10 MG: 10 TABLET ORAL at 09:11

## 2025-02-23 RX ADMIN — INSULIN LISPRO 6 UNITS: 100 INJECTION, SOLUTION INTRAVENOUS; SUBCUTANEOUS at 12:46

## 2025-02-23 RX ADMIN — ASPIRIN 81 MG CHEWABLE TABLET 81 MG: 81 TABLET CHEWABLE at 09:08

## 2025-02-23 RX ADMIN — MAGNESIUM SULFATE IN WATER 2 G: 40 INJECTION, SOLUTION INTRAVENOUS at 06:03

## 2025-02-23 RX ADMIN — INSULIN LISPRO 3 UNITS: 100 INJECTION, SOLUTION INTRAVENOUS; SUBCUTANEOUS at 22:09

## 2025-02-23 RX ADMIN — INSULIN GLARGINE 25 UNITS: 100 INJECTION, SOLUTION SUBCUTANEOUS at 22:09

## 2025-02-23 RX ADMIN — MIDODRINE HYDROCHLORIDE 10 MG: 10 TABLET ORAL at 11:55

## 2025-02-23 ASSESSMENT — PAIN - FUNCTIONAL ASSESSMENT
PAIN_FUNCTIONAL_ASSESSMENT: 0-10

## 2025-02-23 ASSESSMENT — COGNITIVE AND FUNCTIONAL STATUS - GENERAL
CLIMB 3 TO 5 STEPS WITH RAILING: A LITTLE
DAILY ACTIVITIY SCORE: 24
MOBILITY SCORE: 24
DAILY ACTIVITIY SCORE: 24
MOBILITY SCORE: 23

## 2025-02-23 ASSESSMENT — PAIN SCALES - GENERAL
PAINLEVEL_OUTOF10: 0 - NO PAIN

## 2025-02-23 NOTE — PROGRESS NOTES
Critical Care Medicine       Date:  2/23/2025  Patient:  Desiree Shepard  YOB: 1960  MRN:  [id number]   Admit Date:  2/18/2025      Chief Complaint   Patient presents with    Chest Pain     Patient is currently positive for Flu A and is now experiencing chest pain that started about 2 days ago.  States it as a haed pain that spreads across her chest. Sent from urgent care for further eval         History of Present Illness:  Desiree Shepard is a 64 year old female with pmhx of recurrent major depressive disorder, DM, mixed hyperlipidemia, anxiety, and lumbar spondylosis. Patient was admitted on 2/18 with new onset chest pain, crushing in nature. Patient also complaining of malaise that has present for atleast a month. Patient denies radiation, n/v, syncope, chest palpitations or sob. Patients labs in ED concerning for hyperglycemia of 517, elevated troponin of 382 and repeat of 954, elevated beta hydroxybutyrate of 3.06 hba1c of 11.2 and positive flu a. 12 lead on admission was negative for st elevation. Patient at this point was admitted to step down with plan of left heart cath on 2/19 was started on a heparin gtt.     Am labs 2/19 showing worsening dka with elevated beta hydroxybutyrate of 11.14 glucose of 302, and vbg showing metabolic acidosis ph of 7.19 co2 of 20 bicarb of 7.6 and lactate of 1.6. Patient was transferred to ICU as cardiology wanted stabilization of patient before performing left heart cath. Follow up LHC planned for 2/20.      Interval ICU Events:  2/20: Currently on insulin gtt for DKA. On dobutamine @ 5 and levo at 0.1. On heparing gtt. Cardiology following for possible LHC today.     2/21: NAEO. LHC NL stopped dobutamine and heparin gtt. Still on 0.07 of levo will cont to wean. Off insulin gtt since 3am started on 15u lantus. So far sugars high so increased to 20u lantus and POCT q4h till sugars under control.     2/22: Patient has been off levo and insulin gtt since yesterday  afternoon. Cardiology considering starting low dose BB when BP stable. Increased lantus from 20-25u. Plan to transfer to step down. Cardiology added one midodrine 10 TID.     2/23: Patient still hypotensive overnight even with midodrine. No lactate and asymptomatic. Cardiology ok to transfer to step down. Ordered AM cortisol.     Objective     Past Medical History:   Diagnosis Date    Acid reflux     Age-related nuclear cataract of both eyes 6/13/2024    Diabetes mellitus (Multi)     Herpes     Hiatal hernia     High cholesterol     History of HPV infection     IBS (irritable bowel syndrome)     Insomnia     Rosacea     Unspecified disorder of refraction 6/13/2024     Past Surgical History:   Procedure Laterality Date    APPENDECTOMY  2005    CARDIAC CATHETERIZATION N/A 2/20/2025    Procedure: Left Heart Cath;  Surgeon: Navin Hughes DO;  Location: [facility] Cardiac [facility] Lab;  Service: Cardiovascular;  Laterality: N/A;    COLONOSCOPY  2015    TORTUOUS COLON- REPEAT IN 5 YEARS--DR. HERNANDES    HERNIA REPAIR  01/2018    TUBAL LIGATION Bilateral 11/1991     Medications Prior to Admission   Medication Sig Dispense Refill Last Dose/Taking    chlorpheniramine/dextromethorp (CORICIDIN HBP COUGH AND COLD ORAL) Take 1 each by mouth once daily as needed.   2/18/2025    cholecalciferol (Vitamin D-3) 25 MCG (1000 UT) capsule Take 1 capsule (25 mcg) by mouth once every 24 hours.   2/18/2025    cyanocobalamin (Vitamin B-12) 1,000 mcg tablet Take 1 tablet (1,000 mcg) by mouth once daily.   2/18/2025    empagliflozin (Jardiance) 25 mg Take 1 tablet (25 mg) by mouth once daily. 90 tablet 3 2/18/2025    hydrOXYzine HCL (Atarax) 25 mg tablet TAKE TWO TABLETS BY MOUTH EVERY 8 HOURS FOR ANXIETY 270 tablet 3 2/18/2025    MILK THISTLE ORAL Take 1 each by mouth once daily.   2/18/2025    multivitamin tablet Take 1 tablet by mouth once daily.   2/18/2025    PARoxetine (Paxil) 40 mg tablet Take 1.5 tablets (60 mg) by mouth once daily. 135 tablet 3  2/18/2025    pravastatin (Pravachol) 20 mg tablet Take 1 tablet (20 mg) by mouth once daily. 90 tablet 3 2/18/2025    traZODone (Desyrel) 100 mg tablet TAKE TWO TABLETS BY MOUTH EVERY  tablet 3 2/17/2025    Trulicity 4.5 mg/0.5 mL pen injector INJECT 4.5MG UNDER THE SKIN ONCE WEEKLY 2 mL 3 2/17/2025    TURMERIC ORAL Take 1 each by mouth once daily.   2/18/2025    benzonatate (Tessalon) 200 mg capsule Take 1 capsule (200 mg) by mouth 3 times a day as needed for cough for up to 7 days. Do not crush or chew. (Patient not taking: Reported on 2/18/2025) 30 capsule 0 Not Taking    blood-glu meter,cont-transmit misc as directed   Unknown    blood-glucose sensor (Dexcom G7 Sensor) device Change every 10 DAYS 3 each 11 Unknown    guaiFENesin (Mucinex) 1,200 mg tablet extended release 12hr Take 1 tablet (1,200 mg) by mouth every 12 hours if needed (cough) for up to 7 days. (Patient not taking: Reported on 2/18/2025) 14 tablet 0 Not Taking    zoster vaccine-recombinant adjuvanted (Shingrix) 50 mcg/0.5 mL vaccine Inject 0.5 mL into the muscle every 8 (eight) weeks. (Patient not taking: Reported on 9/5/2024) 1 mL 0      Penicillin, Statins-hmg-coa reductase inhibitors, and Sulfamethoxazole-trimethoprim  Social History     Tobacco Use    Smoking status: Never     Passive exposure: Never    Smokeless tobacco: Never   Vaping Use    Vaping status: Never Used   Substance Use Topics    Alcohol use: Not Currently    Drug use: Never     Family History   Problem Relation Name Age of Onset    Ulcerative colitis Mother      Throat cancer Father      Aneurysm Father          under his belly button - inherited    Leukemia Father      Other (abdominal aortic aneurysm) Father      Diabetes Father      Hypertension Father      Lupus Sister      Other (factor v) Sister      Blood clot Sister      Cancer Sister      Heart attack Brother  40    Testicular cancer Brother      Deep vein thrombosis Brother      Heart disease Brother       Hypertension Brother      Lupus Daughter      Ulcerative colitis Daughter      Obesity Daughter      Cancer Daughter      No Known Problems Daughter      No Known Problems Daughter      Other (abdominal aortic aneurysm) Paternal Grandfather      Lupus Half-Sister         Hospital Medications:             Current Facility-Administered Medications:     acetaminophen (Tylenol) tablet 650 mg, 650 mg, oral, q6h PRN, Amy Flaherty PA-C, 650 mg at 02/22/25 1004    aspirin chewable tablet 81 mg, 81 mg, oral, Daily, Amy Flaherty PA-C, 81 mg at 02/22/25 0949    benzonatate (Tessalon) capsule 100 mg, 100 mg, oral, TID PRN, Amy Flaherty PA-C, 100 mg at 02/22/25 1654    dextrose 50 % injection 12.5 g, 12.5 g, intravenous, q15 min PRN, Jose Amos PA-C    dextrose 50 % injection 25 g, 25 g, intravenous, q15 min PRN, Jose Amos PA-C    [Held by provider] empagliflozin (Jardiance) tablet 25 mg, 25 mg, oral, Daily, Amy Flaherty PA-C    enoxaparin (Lovenox) syringe 40 mg, 40 mg, subcutaneous, Daily, Amy Flaherty PA-C, 40 mg at 02/22/25 0950    glucagon (Glucagen) injection 1 mg, 1 mg, intramuscular, q15 min PRN, Jose Amos PA-C    glucagon (Glucagen) injection 1 mg, 1 mg, intramuscular, q15 min PRN, Jose Amos PA-C    hydrOXYzine HCL (Atarax) tablet 25 mg, 25 mg, oral, q8h PRN, Amy Flaherty PA-C    insulin glargine (Lantus) injection 25 Units, 25 Units, subcutaneous, q24h, Amy Flaherty PA-C, 25 Units at 02/22/25 2030    insulin lispro injection 0-15 Units, 0-15 Units, subcutaneous, Before meals & nightly, Collin Lim PA-C    ipratropium-albuteroL (Duo-Neb) 0.5-2.5 mg/3 mL nebulizer solution 3 mL, 3 mL, nebulization, q6h PRN, Amy Flaherty PA-C    midodrine (Proamatine) tablet 10 mg, 10 mg, oral, TID, Freya June MD, 10 mg at 02/22/25 1654    morphine injection 2 mg, 2 mg, intravenous, q4h PRN, Amy Flaherty PA-C    PARoxetine (Paxil) tablet 60 mg, 60 mg, oral, Daily, Amy Flaherty PA-C,  60 mg at 02/22/25 0950    polyethylene glycol (Glycolax, Miralax) packet 17 g, 17 g, oral, Daily PRN, Amy Flaherty PA-C    prochlorperazine (Compazine) injection 5 mg, 5 mg, intravenous, q6h PRN, Amy Flaherty PA-C    traZODone (Desyrel) tablet 200 mg, 200 mg, oral, Nightly, Amy Flaherty PA-C, 200 mg at 02/22/25 2030    Physical Exam:    Heart Rate:  []   Temp:  [36.3 °C (97.3 °F)-37.1 °C (98.7 °F)]   Resp:  [14-33]   BP: ()/(38-69)   Weight:  [55.7 kg (122 lb 12.7 oz)-56.7 kg (125 lb)]   SpO2:  [90 %-97 %]     Physical Exam  Constitutional:       Appearance: Normal appearance.   HENT:      Head: Normocephalic and atraumatic.      Mouth/Throat:      Mouth: Mucous membranes are moist.   Eyes:      Pupils: Pupils are equal, round, and reactive to light.   Cardiovascular:      Rate and Rhythm: Normal rate and regular rhythm.      Pulses: Normal pulses.      Heart sounds: Normal heart sounds.   Pulmonary:      Effort: Pulmonary effort is normal.      Breath sounds: Normal breath sounds.   Abdominal:      General: Abdomen is flat. There is no distension.      Palpations: Abdomen is soft.      Tenderness: There is no abdominal tenderness.   Musculoskeletal:         General: No swelling.   Skin:     General: Skin is warm.      Capillary Refill: Capillary refill takes less than 2 seconds.   Neurological:      Mental Status: She is alert.   Psychiatric:         Mood and Affect: Mood normal.         Review of Systems:  14 point review of systems was completed and negative except for those specially mention in my HPI    I have reviewed all medications, laboratory results, and imaging pertinent for today's encounter.           Intake/Output Summary (Last 24 hours) at 2/23/2025 0809  Last data filed at 2/22/2025 2000  Gross per 24 hour   Intake 880 ml   Output --   Net 880 ml            Assessment/Plan:    I am currently managing this critically ill patient for the following problems:    Neuro/Psych/Pain  Ctrl/Sedation:  Acute pain/Chest pain  Major Depressive disorder/Anxiety  Lumbar Spondylosis   - CAM ICU score q-shift  - Sleep/wake cycle hygiene  - Delirium precaution   - Pain Management: Tylenol, Morphine for chest pain  - Q4 Neuro assessments  - Continue home trazodone at night and daily paxil     Respiratory/ENT:  Positive flu a  CXR 2/19: Perihilar infiltrates bilaterally, R>L,  - Continuous Pulse oximetry  - Wean Fio2 to maintain Spo2>92%  - Bronchial hygiene and IS  - Stopped tamiflu as patient was outside of treatment window based on her reported time of initial symptoms      Cardiovascular:  Stress Cardiomyopathy  NSTEMI  Hyperlipidemia   No previous echo on file  - Continuous Telemetry  - Systolic goals >90 and < 160  - 12 leads prn for any new cp  - Echo : EF 30-35%  - Cardiology following will appreciate recs  -- [facility] 2/20: WNL  -- Midodrine 10 TID  -- Dbobutaminbe abnd heparin gtt off- Trop 900 -> 1700 -> 341     Renal/Volume Status (Intra & Extravascular):  Elevated anion gap metabolic acidosis   - Maintain urine output 0.5-1.0cc/kg/hr  - Monitor I/O's  - Replete electrolytes to maintain K >4.0 and Mg >2.0  - Q4 RFP and mags  - I/O: +1L      GI:  - Diet: diabetic  - BR: miralax prn     Infectious Disease:  Influenza A positive  - Daily CBC  - WBC: 6.3  - Sputum ordered     Heme/Onc:  - DVT Prophylaxis: SCDs, lovenox  - Transfuse for hemoglobin < 7     Endocrine:  Diabetic ketoacidosis w/out coma  DM  - 20u lantus nightly increase to 25u   - ISS, POCT q4h to ensure stable   - HGB A1C 11.2  - TSH normal     OBGYN/Ortho:  - PT/OT     Ethics/Code Status:  Full Code     Lab Draw Frequency:  Daily     :  DVT Prophylaxis: SCDs, lovenox  GI Prophylaxis: None  Bowel Regimen: Miralax prn  Diet: Diabetic  CVC: None  Asya: None  Tate: None  Restraints: None  Dispo: Transfer to step down    Critical Care Time:  40 minutes spent in preparing to see patient (I.e.labs,imaging, etc.), documentation,  discussion plan of care with patient/family/caregiver, and/ or coordination of care with multidisciplinary team including the attending. Time does not include completion of procedure time.     Amy Flaherty PA-C  Pulmonology & Critical Care Medicine   [facility]

## 2025-02-23 NOTE — CARE PLAN
Problem: Pain - Adult  Goal: Verbalizes/displays adequate comfort level or baseline comfort level  Outcome: Progressing  Flowsheets (Taken 2/22/2025 1934)  Verbalizes/displays adequate comfort level or baseline comfort level:   Administer analgesics based on type and severity of pain and evaluate response   Encourage patient to monitor pain and request assistance   Consider cultural and social influences on pain and pain management   Notify Licensed Independent Practitioner if interventions unsuccessful or patient reports new pain   Implement non-pharmacological measures as appropriate and evaluate response   Assess pain using appropriate pain scale     Problem: Safety - Adult  Goal: Free from fall injury  Outcome: Progressing  Flowsheets (Taken 2/22/2025 1934)  Free from fall injury:   Instruct family/caregiver on patient safety   Based on caregiver fall risk screen, instruct family/caregiver to ask for assistance with transferring infant if caregiver noted to have fall risk factors     Problem: Discharge Planning  Goal: Discharge to home or other facility with appropriate resources  Outcome: Progressing  Flowsheets (Taken 2/22/2025 1934)  Discharge to home or other facility with appropriate resources:   Identify barriers to discharge with patient and caregiver   Identify discharge learning needs (meds, wound care, etc)   Refer to discharge planning if patient needs post-hospital services based on physician order or complex needs related to functional status, cognitive ability or social support system   Arrange for needed discharge resources and transportation as appropriate   Arrange for interpreters to assist at discharge as needed     Problem: Chronic Conditions and Co-morbidities  Goal: Patient's chronic conditions and co-morbidity symptoms are monitored and maintained or improved  Outcome: Progressing  Flowsheets (Taken 2/22/2025 1934)  Care Plan - Patient's Chronic Conditions and Co-Morbidity Symptoms are  Monitored and Maintained or Improved:   Monitor and assess patient's chronic conditions and comorbid symptoms for stability, deterioration, or improvement   Collaborate with multidisciplinary team to address chronic and comorbid conditions and prevent exacerbation or deterioration   Update acute care plan with appropriate goals if chronic or comorbid symptoms are exacerbated and prevent overall improvement and discharge     Problem: Nutrition  Goal: Nutrient intake appropriate for maintaining nutritional needs  Outcome: Progressing     Problem: Diabetes  Goal: Achieve decreasing blood glucose levels by end of shift  Outcome: Progressing  Goal: Increase stability of blood glucose readings by end of shift  Outcome: Progressing  Goal: Maintain electrolyte levels within acceptable range throughout shift  Outcome: Progressing  Goal: Maintain glucose levels >70mg/dl to <250mg/dl throughout shift  Outcome: Progressing  Goal: No changes in neurological exam by end of shift  Outcome: Progressing  Goal: Learn about and adhere to nutrition recommendations by end of shift  Outcome: Progressing  Goal: Vital signs within normal range for age by end of shift  Outcome: Progressing  Goal: Increase self care and/or family involovement by end of shift  Outcome: Progressing  Goal: Receive DSME education by end of shift  Outcome: Progressing     Problem: Skin  Goal: Participates in plan/prevention/treatment measures  Outcome: Progressing  Flowsheets (Taken 2/22/2025 1934)  Participates in plan/prevention/treatment measures:   Discuss with provider PT/OT consult   Elevate heels   Increase activity/out of bed for meals  Goal: Prevent/manage excess moisture  Outcome: Progressing  Flowsheets (Taken 2/22/2025 1934)  Prevent/manage excess moisture:   Use wicking fabric (obtain order)   Cleanse incontinence/protect with barrier cream   Follow provider orders for dressing changes   Moisturize dry skin   Monitor for/manage infection if  present  Goal: Prevent/minimize sheer/friction injuries  Outcome: Progressing  Flowsheets (Taken 2/22/2025 1934)  Prevent/minimize sheer/friction injuries:   Use pull sheet   Increase activity/out of bed for meals   Complete micro-shifts as needed if patient unable. Adjust patient position to relieve pressure points, not a full turn   HOB 30 degrees or less   Utilize specialty bed per algorithm   Turn/reposition every 2 hours/use positioning/transfer devices  Goal: Promote/optimize nutrition  Outcome: Progressing  Flowsheets (Taken 2/22/2025 1934)  Promote/optimize nutrition:   Offer water/supplements/favorite foods   Monitor/record intake including meals     Problem: Fall/Injury  Goal: Not fall by end of shift  Outcome: Progressing  Goal: Be free from injury by end of the shift  Outcome: Progressing  Goal: Verbalize understanding of personal risk factors for fall in the hospital  Outcome: Progressing  Goal: Verbalize understanding of risk factor reduction measures to prevent injury from fall in the home  Outcome: Progressing     Problem: Pain  Goal: Takes deep breaths with improved pain control throughout the shift  Outcome: Progressing  Goal: Turns in bed with improved pain control throughout the shift  Outcome: Progressing  Goal: Walks with improved pain control throughout the shift  Outcome: Progressing  Goal: Performs ADL's with improved pain control throughout shift  Outcome: Progressing  Goal: Participates in PT with improved pain control throughout the shift  Outcome: Progressing   The patient's goals for the shift include feel better    The clinical goals for the shift include hemodynamically stable    Over the shift, the patient did not make progress toward the following goals. Barriers to progression include . Recommendations to address these barriers include .

## 2025-02-23 NOTE — CARE PLAN
The patient's goals for the shift include feel better    The clinical goals for the shift include Stable vitals and labs, increase activity as tolerated    The patient continues to make progress toward the following goals and was cleared to transfer to SDU when bed available:      Problem: Pain - Adult  Goal: Verbalizes/displays adequate comfort level or baseline comfort level  Outcome: Progressing  Flowsheets (Taken 2/23/2025 1048)  Verbalizes/displays adequate comfort level or baseline comfort level:   Encourage patient to monitor pain and request assistance   Assess pain using appropriate pain scale   Administer analgesics based on type and severity of pain and evaluate response   Implement non-pharmacological measures as appropriate and evaluate response   Consider cultural and social influences on pain and pain management   Notify Licensed Independent Practitioner if interventions unsuccessful or patient reports new pain     Problem: Safety - Adult  Goal: Free from fall injury  Outcome: Progressing  Flowsheets (Taken 2/23/2025 1048)  Free from fall injury: Instruct family/caregiver on patient safety     Problem: Discharge Planning  Goal: Discharge to home or other facility with appropriate resources  Outcome: Progressing  Flowsheets (Taken 2/23/2025 1048)  Discharge to home or other facility with appropriate resources:   Identify barriers to discharge with patient and caregiver   Arrange for needed discharge resources and transportation as appropriate   Identify discharge learning needs (meds, wound care, etc)     Problem: Chronic Conditions and Co-morbidities  Goal: Patient's chronic conditions and co-morbidity symptoms are monitored and maintained or improved  Outcome: Progressing  Flowsheets (Taken 2/23/2025 1048)  Care Plan - Patient's Chronic Conditions and Co-Morbidity Symptoms are Monitored and Maintained or Improved:   Monitor and assess patient's chronic conditions and comorbid symptoms for stability,  deterioration, or improvement   Collaborate with multidisciplinary team to address chronic and comorbid conditions and prevent exacerbation or deterioration   Update acute care plan with appropriate goals if chronic or comorbid symptoms are exacerbated and prevent overall improvement and discharge     Problem: Nutrition  Goal: Nutrient intake appropriate for maintaining nutritional needs  Outcome: Progressing     Problem: Diabetes  Goal: Achieve decreasing blood glucose levels by end of shift  Outcome: Progressing  Flowsheets (Taken 2/22/2025 1741)  Achieve decreasing blood glucose levels by end of shift: Med administration/monitoring of effect  Goal: Increase stability of blood glucose readings by end of shift  Outcome: Progressing  Flowsheets (Taken 2/22/2025 1741)  Increase stability of blood glucose readings by end of shift: Med administration/monitoring of effect  Goal: Maintain electrolyte levels within acceptable range throughout shift  Outcome: Progressing  Flowsheets (Taken 2/22/2025 1741)  Maintain electrolyte levels within acceptable range throughout shift:   Med administration/monitoring of effect   Monitor urine output  Goal: Maintain glucose levels >70mg/dl to <250mg/dl throughout shift  Outcome: Progressing  Flowsheets (Taken 2/22/2025 1741)  Maintain glucose levels >70mg/dl to <250mg/dl throughout shift: Med administration/monitoring of effect  Goal: No changes in neurological exam by end of shift  Outcome: Progressing  Flowsheets (Taken 2/23/2025 1048)  No changes in neurological exam by end of shift: Complete frequent neurological assessments  Goal: Learn about and adhere to nutrition recommendations by end of shift  Outcome: Progressing  Flowsheets (Taken 2/22/2025 1741)  Learn about and adhere to nutrition recommendations by end of shift: Ensure/encourage compliance with appropriate diet  Goal: Vital signs within normal range for age by end of shift  Outcome: Progressing  Flowsheets (Taken  2/22/2025 1741)  Vital signs within normal range for age by end of shift: Med administration/monitoring of effect  Goal: Increase self care and/or family involovement by end of shift  Outcome: Progressing  Goal: Receive DSME education by end of shift  Outcome: Progressing  Flowsheets (Taken 2/22/2025 1741)  Receive DSME education by end of shift: Provide patient centered education on Diabetic Self Management Education     Problem: Skin  Goal: Participates in plan/prevention/treatment measures  Outcome: Progressing  Flowsheets (Taken 2/23/2025 1048)  Participates in plan/prevention/treatment measures:   Elevate heels   Increase activity/out of bed for meals  Goal: Prevent/manage excess moisture  Outcome: Progressing  Flowsheets (Taken 2/23/2025 1048)  Prevent/manage excess moisture: Moisturize dry skin  Goal: Prevent/minimize sheer/friction injuries  Outcome: Progressing  Flowsheets (Taken 2/23/2025 1048)  Prevent/minimize sheer/friction injuries:   Complete micro-shifts as needed if patient unable. Adjust patient position to relieve pressure points, not a full turn   HOB 30 degrees or less   Increase activity/out of bed for meals   Turn/reposition every 2 hours/use positioning/transfer devices   Use pull sheet   Utilize specialty bed per algorithm  Goal: Promote/optimize nutrition  Outcome: Progressing  Flowsheets (Taken 2/23/2025 1048)  Promote/optimize nutrition:   Monitor/record intake including meals   Offer water/supplements/favorite foods     Problem: Fall/Injury  Goal: Not fall by end of shift  Outcome: Progressing  Goal: Be free from injury by end of the shift  Outcome: Progressing  Goal: Verbalize understanding of personal risk factors for fall in the hospital  Outcome: Progressing  Goal: Verbalize understanding of risk factor reduction measures to prevent injury from fall in the home  Outcome: Progressing     Problem: Pain  Goal: Takes deep breaths with improved pain control throughout the shift  Outcome:  Progressing  Goal: Turns in bed with improved pain control throughout the shift  Outcome: Progressing  Goal: Walks with improved pain control throughout the shift  Outcome: Progressing  Goal: Performs ADL's with improved pain control throughout shift  Outcome: Progressing  Goal: Participates in PT with improved pain control throughout the shift  Outcome: Progressing

## 2025-02-23 NOTE — PROGRESS NOTES
Subjective Data:  Patient is doing good.  Nuys any chest pain.  No shortness of breath.    Overnight Events:    Telemetry overnight reviewed no events     Objective Data:  Last Recorded Vitals:  Vitals:    02/23/25 0700 02/23/25 0750 02/23/25 0800 02/23/25 0900   BP: 81/54  104/77 96/68   Pulse: 61  89 82   Resp: 20  21 18   Temp:  36.6 °C (97.9 °F)     TempSrc:  Oral     SpO2: 94%  95% (!) 89%   Weight:       Height:           Last Labs:  CBC - 2/23/2025:  4:28 AM  6.3 12.3 282    36.3      CMP - 2/23/2025:  4:28 AM  8.4 6.3 34 --- 0.4   4.4 2.8 22 87      PTT - 2/20/2025:  4:47 AM  _   _ 44.0     TROPHS   Date/Time Value Ref Range Status   02/22/2025 04:58  0 - 13 ng/L Final   02/20/2025 07:28 AM 1,788 0 - 13 ng/L Final   02/18/2025 11:14  0 - 13 ng/L Final     Comment:     Previous result verified on 2/18/2025 1050 on specimen/case [id number] called with component [facility] for procedure Troponin I, High Sensitivity, Initial with value 382 ng/L.     BNP   Date/Time Value Ref Range Status   02/18/2025 12:35 PM 90 0 - 99 pg/mL Final     HGBA1C   Date/Time Value Ref Range Status   02/18/2025 12:35 PM 11.2 See comment % Final   12/05/2024 04:01 PM 7.2 4.2 - 6.5 % Final   09/05/2024 04:13 PM 7.4 4.2 - 6.5 % Final     LDLCALC   Date/Time Value Ref Range Status   02/19/2025 04:53 AM   Final     Comment:     The calculation of LDL and VLDL are inaccurate when the Triglycerides are greater than 400 mg/dL or when the patient is non-fasting. If LDL measurement is necessary contact the testing laboratory for an alternative LDL assay.                                  Near   Borderline      AGE      Desirable  Optimal    High     High     Very High     0-19 Y     0 - 109     ---    110-129   >/= 130     ----    20-24 Y     0 - 119     ---    120-159   >/= 160     ----      >24 Y     0 -  99   100-129  130-159   160-189     >/=190     10/18/2024 10:40  <=99 mg/dL Final     Comment:                                  Near   Borderline      AGE      Desirable  Optimal    High     High     Very High     0-19 Y     0 - 109     ---    110-129   >/= 130     ----    20-24 Y     0 - 119     ---    120-159   >/= 160     ----      >24 Y     0 -  99   100-129  130-159   160-189     >/=190     09/19/2023 09:00  65 - 130 MG/DL Final   09/21/2022 02:13 PM 97 65 - 130 MG/DL Final   03/25/2021 04:24  65 - 130 MG/DL Final     VLDL   Date/Time Value Ref Range Status   02/19/2025 04:53 AM   Final     Comment:     Unable to calculate VLDL.   10/18/2024 10:40 AM 21 0 - 40 mg/dL Final      Last I/O:  I/O last 3 completed shifts:  In: 1689.6 (30.3 mL/kg) [P.O.:930; I.V.:109.6 (2 mL/kg); IV Piggyback:650]  Out: - (0 mL/kg)   Weight: 55.7 kg     Past Cardiology Tests (Last 3 Years):  EKG:  Electrocardiogram 12 Lead 02/20/2025      ECG 12 Lead 02/20/2025      ECG 12 lead 02/20/2025      ECG 12 Lead       ECG 12 lead 02/18/2025      ECG 12 lead 02/18/2025      ECG 12 lead (Clinic Performed)     Echo:  Transthoracic Echo (TTE) Complete 02/18/2025    Ejection Fractions:  EF   Date/Time Value Ref Range Status   02/18/2025 03:41 PM 33 %      Cath:  Cardiac Catheterization Procedure 02/20/2025    Stress Test:  No results found for this or any previous visit from the past 1095 days.    Cardiac Imaging:  No results found for this or any previous visit from the past 1095 days.      Inpatient Medications:  Scheduled medications   Medication Dose Route Frequency    aspirin  81 mg oral Daily    [Held by provider] empagliflozin  25 mg oral Daily    enoxaparin  40 mg subcutaneous Daily    insulin glargine  25 Units subcutaneous q24h    insulin lispro  0-15 Units subcutaneous Before meals & nightly    midodrine  10 mg oral TID    PARoxetine  60 mg oral Daily    traZODone  200 mg oral Nightly     PRN medications   Medication    acetaminophen    benzonatate    dextromethorphan-guaifenesin    dextrose    dextrose    glucagon    glucagon    hydrOXYzine HCL     ipratropium-albuteroL    morphine    polyethylene glycol    prochlorperazine     Continuous Medications   Medication Dose Last Rate       Physical Exam:    General: Patient is in no acute distress.  HEENT: atraumatic normocephalic.  Neck: is supple jugular venous pressure within normal limits no thyromegaly.  Cardiovascular regular rate and rhythm normal heart sounds no murmurs rubs or gallops.  Lungs: clear to auscultation bilaterally.  Abdomen: is soft nontender.  Extremities warm to touch no edema.           Assessment/Plan  #1. acute non-ST elevation myocardial infarction.  Echocardiogram showed apical and anterior wall severe hypokinesis.  Cardiac catheterization showed no significant coronary disease with LINDA-3 flow.  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  Troponin elevated.  She may have myocarditis.  No indication for aspirin or NSAIDs.  Occasions for steroids.  No significant arrhythmias.  We will monitor for now.  She is weaning off pressors.  Keep maps above 65 mmHg.  Patient renal function stable.  No indication for heparin drip.  Will monitor.  Patient is not in cardiogenic shock.  I will hold off beta-blockers for today since her blood pressure remains on the low side.  Blood pressure better with the midodrine.  Maybe start beta-blocker tomorrow patient usually has low blood pressure at baseline.      2. Influenza A infection management by primary team.     3.  Hypotension.  Patient has underlying history of blood pressure running on the low side.  I will start on midodrine 10 mg oral daily.  Wean her off Levophed if she is on any.  Her renal function stable.  Monitor renal function and urine output.  Peripheral IV 02/19/25 20 G Left;Anterior Forearm (Active)   Site Assessment Clean;Dry;Intact 02/23/25 0400   Dressing Type Transparent 02/23/25 0400   Line Status Flushed;Saline locked 02/23/25 0400   Dressing Status Clean;Dry;Occlusive 02/23/25 0400   Number  of days: 4       Peripheral IV 02/20/25 20 G Left;Proximal;Anterior;Upper Forearm (Active)   Site Assessment Clean;Dry;Intact 02/23/25 0400   Dressing Type Transparent 02/23/25 0400   Line Status Flushed;Saline locked 02/23/25 0400   Dressing Status Clean;Dry;Occlusive 02/23/25 0400   Number of days: 3       Code Status:  Full Code        Freya June MD

## 2025-02-24 ENCOUNTER — APPOINTMENT (OUTPATIENT)
Dept: CARDIOLOGY | Facility: HOSPITAL | Age: 65
End: 2025-02-24
Payer: COMMERCIAL

## 2025-02-24 LAB
ALBUMIN SERPL BCP-MCNC: 2.8 G/DL (ref 3.4–5)
ANION GAP SERPL CALCULATED.3IONS-SCNC: 10 MMOL/L (ref 10–20)
BASOPHILS # BLD AUTO: 0.02 X10*3/UL (ref 0–0.1)
BASOPHILS NFR BLD AUTO: 0.3 %
BUN SERPL-MCNC: 9 MG/DL (ref 6–23)
CALCIUM SERPL-MCNC: 8 MG/DL (ref 8.6–10.3)
CHLORIDE SERPL-SCNC: 105 MMOL/L (ref 98–107)
CO2 SERPL-SCNC: 30 MMOL/L (ref 21–32)
CORTIS AM PEAK SERPL-MSCNC: 14.6 UG/DL (ref 5–20)
CREAT SERPL-MCNC: 0.62 MG/DL (ref 0.5–1.05)
EGFRCR SERPLBLD CKD-EPI 2021: >90 ML/MIN/1.73M*2
EOSINOPHIL # BLD AUTO: 0.09 X10*3/UL (ref 0–0.7)
EOSINOPHIL NFR BLD AUTO: 1.4 %
ERYTHROCYTE [DISTWIDTH] IN BLOOD BY AUTOMATED COUNT: 12.9 % (ref 11.5–14.5)
GLUCOSE BLD MANUAL STRIP-MCNC: 116 MG/DL (ref 74–99)
GLUCOSE BLD MANUAL STRIP-MCNC: 144 MG/DL (ref 74–99)
GLUCOSE BLD MANUAL STRIP-MCNC: 183 MG/DL (ref 74–99)
GLUCOSE BLD MANUAL STRIP-MCNC: 184 MG/DL (ref 74–99)
GLUCOSE BLD MANUAL STRIP-MCNC: 202 MG/DL (ref 74–99)
GLUCOSE BLD MANUAL STRIP-MCNC: 66 MG/DL (ref 74–99)
GLUCOSE SERPL-MCNC: 74 MG/DL (ref 74–99)
HCT VFR BLD AUTO: 35.5 % (ref 36–46)
HGB BLD-MCNC: 11.5 G/DL (ref 12–16)
IMM GRANULOCYTES # BLD AUTO: 0.02 X10*3/UL (ref 0–0.7)
IMM GRANULOCYTES NFR BLD AUTO: 0.3 % (ref 0–0.9)
LYMPHOCYTES # BLD AUTO: 3.17 X10*3/UL (ref 1.2–4.8)
LYMPHOCYTES NFR BLD AUTO: 49.5 %
MAGNESIUM SERPL-MCNC: 1.67 MG/DL (ref 1.6–2.4)
MCH RBC QN AUTO: 27.8 PG (ref 26–34)
MCHC RBC AUTO-ENTMCNC: 32.4 G/DL (ref 32–36)
MCV RBC AUTO: 86 FL (ref 80–100)
MONOCYTES # BLD AUTO: 0.82 X10*3/UL (ref 0.1–1)
MONOCYTES NFR BLD AUTO: 12.8 %
NEUTROPHILS # BLD AUTO: 2.29 X10*3/UL (ref 1.2–7.7)
NEUTROPHILS NFR BLD AUTO: 35.7 %
NRBC BLD-RTO: 0 /100 WBCS (ref 0–0)
PHOSPHATE SERPL-MCNC: 4.6 MG/DL (ref 2.5–4.9)
PLATELET # BLD AUTO: 339 X10*3/UL (ref 150–450)
POTASSIUM SERPL-SCNC: 3.5 MMOL/L (ref 3.5–5.3)
RBC # BLD AUTO: 4.13 X10*6/UL (ref 4–5.2)
SODIUM SERPL-SCNC: 141 MMOL/L (ref 136–145)
WBC # BLD AUTO: 6.4 X10*3/UL (ref 4.4–11.3)

## 2025-02-24 PROCEDURE — 2500000002 HC RX 250 W HCPCS SELF ADMINISTERED DRUGS (ALT 637 FOR MEDICARE OP, ALT 636 FOR OP/ED)

## 2025-02-24 PROCEDURE — 2500000004 HC RX 250 GENERAL PHARMACY W/ HCPCS (ALT 636 FOR OP/ED)

## 2025-02-24 PROCEDURE — 82947 ASSAY GLUCOSE BLOOD QUANT: CPT

## 2025-02-24 PROCEDURE — 2500000001 HC RX 250 WO HCPCS SELF ADMINISTERED DRUGS (ALT 637 FOR MEDICARE OP)

## 2025-02-24 PROCEDURE — 80069 RENAL FUNCTION PANEL: CPT

## 2025-02-24 PROCEDURE — 83735 ASSAY OF MAGNESIUM: CPT

## 2025-02-24 PROCEDURE — 36415 COLL VENOUS BLD VENIPUNCTURE: CPT

## 2025-02-24 PROCEDURE — 93005 ELECTROCARDIOGRAM TRACING: CPT

## 2025-02-24 PROCEDURE — 93010 ELECTROCARDIOGRAM REPORT: CPT | Performed by: INTERNAL MEDICINE

## 2025-02-24 PROCEDURE — 82533 TOTAL CORTISOL: CPT | Mod: WESLAB

## 2025-02-24 PROCEDURE — 99233 SBSQ HOSP IP/OBS HIGH 50: CPT | Performed by: INTERNAL MEDICINE

## 2025-02-24 PROCEDURE — 2060000001 HC INTERMEDIATE ICU ROOM DAILY

## 2025-02-24 PROCEDURE — 85025 COMPLETE CBC W/AUTO DIFF WBC: CPT

## 2025-02-24 PROCEDURE — 2500000002 HC RX 250 W HCPCS SELF ADMINISTERED DRUGS (ALT 637 FOR MEDICARE OP, ALT 636 FOR OP/ED): Performed by: INTERNAL MEDICINE

## 2025-02-24 PROCEDURE — 99232 SBSQ HOSP IP/OBS MODERATE 35: CPT | Performed by: INTERNAL MEDICINE

## 2025-02-24 RX ORDER — INSULIN LISPRO 100 [IU]/ML
4 INJECTION, SOLUTION INTRAVENOUS; SUBCUTANEOUS
Status: DISCONTINUED | OUTPATIENT
Start: 2025-02-24 | End: 2025-02-27 | Stop reason: HOSPADM

## 2025-02-24 RX ORDER — DEXTROSE 50 % IN WATER (D50W) INTRAVENOUS SYRINGE
25
Status: DISCONTINUED | OUTPATIENT
Start: 2025-02-24 | End: 2025-02-24

## 2025-02-24 RX ORDER — INSULIN LISPRO 100 [IU]/ML
0-5 INJECTION, SOLUTION INTRAVENOUS; SUBCUTANEOUS
Status: DISCONTINUED | OUTPATIENT
Start: 2025-02-24 | End: 2025-02-27 | Stop reason: HOSPADM

## 2025-02-24 RX ORDER — DEXTROSE 50 % IN WATER (D50W) INTRAVENOUS SYRINGE
12.5
Status: DISCONTINUED | OUTPATIENT
Start: 2025-02-24 | End: 2025-02-24

## 2025-02-24 RX ORDER — MIDODRINE HYDROCHLORIDE 5 MG/1
5 TABLET ORAL
Status: DISCONTINUED | OUTPATIENT
Start: 2025-02-24 | End: 2025-02-25

## 2025-02-24 RX ADMIN — ASPIRIN 81 MG CHEWABLE TABLET 81 MG: 81 TABLET CHEWABLE at 08:53

## 2025-02-24 RX ADMIN — INSULIN GLARGINE 25 UNITS: 100 INJECTION, SOLUTION SUBCUTANEOUS at 21:57

## 2025-02-24 RX ADMIN — PAROXETINE HYDROCHLORIDE 60 MG: 20 TABLET, FILM COATED ORAL at 08:53

## 2025-02-24 RX ADMIN — INSULIN LISPRO 6 UNITS: 100 INJECTION, SOLUTION INTRAVENOUS; SUBCUTANEOUS at 12:21

## 2025-02-24 RX ADMIN — TRAZODONE HYDROCHLORIDE 200 MG: 100 TABLET ORAL at 22:00

## 2025-02-24 RX ADMIN — INSULIN LISPRO 1 UNITS: 100 INJECTION, SOLUTION INTRAVENOUS; SUBCUTANEOUS at 17:35

## 2025-02-24 RX ADMIN — MIDODRINE HYDROCHLORIDE 10 MG: 10 TABLET ORAL at 08:53

## 2025-02-24 RX ADMIN — INSULIN LISPRO 4 UNITS: 100 INJECTION, SOLUTION INTRAVENOUS; SUBCUTANEOUS at 11:53

## 2025-02-24 RX ADMIN — INSULIN LISPRO 4 UNITS: 100 INJECTION, SOLUTION INTRAVENOUS; SUBCUTANEOUS at 17:30

## 2025-02-24 RX ADMIN — ENOXAPARIN SODIUM 40 MG: 40 INJECTION SUBCUTANEOUS at 08:53

## 2025-02-24 ASSESSMENT — PAIN SCALES - GENERAL
PAINLEVEL_OUTOF10: 0 - NO PAIN
PAINLEVEL_OUTOF10: 0 - NO PAIN

## 2025-02-24 ASSESSMENT — COGNITIVE AND FUNCTIONAL STATUS - GENERAL
MOBILITY SCORE: 24
DAILY ACTIVITIY SCORE: 24
DAILY ACTIVITIY SCORE: 24
MOBILITY SCORE: 24

## 2025-02-24 ASSESSMENT — PAIN - FUNCTIONAL ASSESSMENT
PAIN_FUNCTIONAL_ASSESSMENT: 0-10
PAIN_FUNCTIONAL_ASSESSMENT: 0-10

## 2025-02-24 NOTE — PROGRESS NOTES
Subjective Data:  Patient is doing good.  Nuys any chest pain.  No shortness of breath.    Overnight Events:    Telemetry overnight reviewed no events     Objective Data:  Last Recorded Vitals:  Vitals:    02/23/25 1814 02/23/25 2206 02/24/25 0439 02/24/25 0727   BP: 89/51 93/53 90/52 92/56   BP Location: Left arm Left arm Left arm Left arm   Patient Position: Lying Lying Lying Lying   Pulse: 79 106 69 75   Resp: 18 16 19 18   Temp: 36.8 °C (98.2 °F) 36.8 °C (98.3 °F) 36.5 °C (97.7 °F) 36.3 °C (97.3 °F)   TempSrc: Oral Oral Oral Temporal   SpO2: 97% 95% 100% 99%   Weight:   59.4 kg (130 lb 15.3 oz)    Height:           Last Labs:  CBC - 2/24/2025:  5:12 AM  6.4 11.5 339    35.5      CMP - 2/24/2025:  5:12 AM  8.0 6.3 34 --- 0.4   4.6 2.8 22 87      PTT - 2/20/2025:  4:47 AM  _   _ 44.0     TROPHS   Date/Time Value Ref Range Status   02/22/2025 04:58  0 - 13 ng/L Final   02/20/2025 07:28 AM 1,788 0 - 13 ng/L Final   02/18/2025 11:14  0 - 13 ng/L Final     Comment:     Previous result verified on 2/18/2025 1050 on specimen/case [id number] called with component [facility] for procedure Troponin I, High Sensitivity, Initial with value 382 ng/L.     BNP   Date/Time Value Ref Range Status   02/18/2025 12:35 PM 90 0 - 99 pg/mL Final     HGBA1C   Date/Time Value Ref Range Status   02/18/2025 12:35 PM 11.2 See comment % Final   12/05/2024 04:01 PM 7.2 4.2 - 6.5 % Final   09/05/2024 04:13 PM 7.4 4.2 - 6.5 % Final     LDLCALC   Date/Time Value Ref Range Status   02/19/2025 04:53 AM   Final     Comment:     The calculation of LDL and VLDL are inaccurate when the Triglycerides are greater than 400 mg/dL or when the patient is non-fasting. If LDL measurement is necessary contact the testing laboratory for an alternative LDL assay.                                  Near   Borderline      AGE      Desirable  Optimal    High     High     Very High     0-19 Y     0 - 109     ---    110-129   >/= 130     ----    20-24 Y      0 - 119     ---    120-159   >/= 160     ----      >24 Y     0 -  99   100-129  130-159   160-189     >/=190     10/18/2024 10:40  <=99 mg/dL Final     Comment:                                 Near   Borderline      AGE      Desirable  Optimal    High     High     Very High     0-19 Y     0 - 109     ---    110-129   >/= 130     ----    20-24 Y     0 - 119     ---    120-159   >/= 160     ----      >24 Y     0 -  99   100-129  130-159   160-189     >/=190     09/19/2023 09:00  65 - 130 MG/DL Final   09/21/2022 02:13 PM 97 65 - 130 MG/DL Final   03/25/2021 04:24  65 - 130 MG/DL Final     VLDL   Date/Time Value Ref Range Status   02/19/2025 04:53 AM   Final     Comment:     Unable to calculate VLDL.   10/18/2024 10:40 AM 21 0 - 40 mg/dL Final      Last I/O:  I/O last 3 completed shifts:  In: 1130.4 (19 mL/kg) [P.O.:1040; I.V.:90.4 (1.5 mL/kg)]  Out: - (0 mL/kg)   Weight: 59.4 kg     Past Cardiology Tests (Last 3 Years):  EKG:  Electrocardiogram 12 Lead 02/20/2025      ECG 12 Lead 02/20/2025      ECG 12 lead 02/20/2025      ECG 12 Lead       ECG 12 lead 02/18/2025      ECG 12 lead 02/18/2025      ECG 12 lead (Clinic Performed)     Echo:  Transthoracic Echo (TTE) Complete 02/18/2025    Ejection Fractions:  EF   Date/Time Value Ref Range Status   02/18/2025 03:41 PM 33 %      Cath:  Cardiac Catheterization Procedure 02/20/2025    Stress Test:  No results found for this or any previous visit from the past 1095 days.    Cardiac Imaging:  No results found for this or any previous visit from the past 1095 days.      Inpatient Medications:  Scheduled medications   Medication Dose Route Frequency    aspirin  81 mg oral Daily    [Held by provider] empagliflozin  25 mg oral Daily    enoxaparin  40 mg subcutaneous Daily    insulin glargine  25 Units subcutaneous q24h    insulin lispro  0-15 Units subcutaneous Before meals & nightly    insulin lispro  4 Units subcutaneous TID AC    midodrine  10 mg oral TID     PARoxetine  60 mg oral Daily    traZODone  200 mg oral Nightly     PRN medications   Medication    acetaminophen    benzonatate    dextromethorphan-guaifenesin    dextrose    dextrose    glucagon    glucagon    hydrOXYzine HCL    ipratropium-albuteroL    morphine    polyethylene glycol    prochlorperazine     Continuous Medications   Medication Dose Last Rate       Physical Exam:    General: Patient is in no acute distress.  HEENT: atraumatic normocephalic.  Neck: is supple jugular venous pressure within normal limits no thyromegaly.  Cardiovascular regular rate and rhythm normal heart sounds no murmurs rubs or gallops.  Lungs: clear to auscultation bilaterally.  Abdomen: is soft nontender.  Extremities warm to touch no edema.           Assessment/Plan  2/23:  #1. acute non-ST elevation myocardial infarction.  Echocardiogram showed apical and anterior wall severe hypokinesis.  Cardiac catheterization showed no significant coronary disease with LINDA-3 flow.  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  Troponin elevated.  She may have myocarditis.  No indication for aspirin or NSAIDs.  Occasions for steroids.  No significant arrhythmias.  We will monitor for now.  She is weaning off pressors.  Keep maps above 65 mmHg.  Patient renal function stable.  No indication for heparin drip.  Will monitor.  Patient is not in cardiogenic shock.  I will hold off beta-blockers for today since her blood pressure remains on the low side.  Blood pressure better with the midodrine.  Maybe start beta-blocker tomorrow patient usually has low blood pressure at baseline.      2. Influenza A infection management by primary team.     3.  Hypotension.  Patient has underlying history of blood pressure running on the low side.  I will start on midodrine 10 mg oral daily.  Wean her off Levophed if she is on any.  Her renal function stable.  Monitor renal function and urine output.    2/24: The patient was seen  events reviewed in detail.  This patient does have a prior history of low normal blood pressure readings type 2 diabetes hyperlipidemia.  She was admitted with influenza A with echocardiogram showing moderately severe LV impairment estimated ejection fraction of 30-35%.  EKG tracings with diffuse precordial and limb lead T wave inversion suggestive of Takotsubo cardiomyopathy.  The cardiac catheterization demonstrated angiographically normal coronary arteries.  Patient transferred from intensive care unit to stepdown unit placed on midodrine 10 mg 3 times daily which we will attempt to wean over the next 24 to 48 hours as long as systolic blood pressures are greater than 85 mmHg.  Following that we will attempt to resume very low-dose guideline directed medical therapy including low-dose beta-blockade and Entresto.  Patient had been on Jardiance prior to admission 25 mg daily will be restarted at 10 mg daily.  Will check serial EKG tracings.  Peripheral IV 02/19/25 20 G Left;Anterior Forearm (Active)   Site Assessment Clean;Dry;Intact 02/23/25 0400   Dressing Type Transparent 02/23/25 0400   Line Status Flushed;Saline locked 02/23/25 0400   Dressing Status Clean;Dry;Occlusive 02/23/25 0400   Number of days: 4       Peripheral IV 02/20/25 20 G Left;Proximal;Anterior;Upper Forearm (Active)   Site Assessment Clean;Dry;Intact 02/23/25 0400   Dressing Type Transparent 02/23/25 0400   Line Status Flushed;Saline locked 02/23/25 0400   Dressing Status Clean;Dry;Occlusive 02/23/25 0400   Number of days: 3       Code Status:  Full Code        Pradeep Tripp MD

## 2025-02-24 NOTE — PROGRESS NOTES
Physical Therapy                 Therapy Communication Note    Patient Name: Desiree Shepard  MRN: [id number]  Department: [address]  Room: [address] [place], [place] [address]  Today's Date: 2/24/2025     Discipline: Physical Therapy    Missed Visit Reason: Other (Comment)   (Attempted follow-up this morning. Pt presented supine in bed and reports feeling back to mobility baseline. Pt reports being up ad geraldo in room; nsg confirms patient with steady gait at an indep level. PT will DC acute therapy services at this time.)

## 2025-02-24 NOTE — CONSULTS
"Inpatient Diabetes Education Consult    Reason for Visit:  Desiree Shepard is a 64 y.o. female who presents to ED on 2/18/25 at 08:50 with c/o \"chest pain\"  Found to have a NSTEMI, Also found to be positive for influenza \"A\", and Hyperglycemic with BS in ED of 517 mg/dl.   Found to be in DKA.    Consulting Service/Provider: DEVAUGHN Gonzalez    Visit Type: Initial visit  Patient in isolation for the FLU     Visit Modality: In-person    Discharge Equipment/Supply Needs: Will need a script for Lantus/Lispro        Patient has supplies at home: Blood glucose meter:  , Testing strips:  , Lancets, and Pen needles    Patient History and Assessment:  Previous diagnosis:   Patient known to Diabetes Education department: No  Treatment prior to hospital admission: Oral medications Jardiance 25 mg q day & Trulicity 4.5 mg  q weekly  Diet: Carb restricted  Complications: CAD s/p Clean cath on 2/20/2/5  PTA Medications:    Current Outpatient Medications   Medication Instructions    benzonatate (TESSALON) 200 mg, oral, 3 times daily PRN, Do not crush or chew.    blood-glu meter,cont-transmit misc as directed    blood-glucose sensor (Dexcom G7 Sensor) device Change every 10 DAYS    chlorpheniramine/dextromethorp (CORICIDIN HBP COUGH AND COLD ORAL) 1 each, oral, Daily PRN    cholecalciferol (Vitamin D-3) 25 MCG (1000 UT) capsule 1 capsule, oral, Every 24 hours    cyanocobalamin (VITAMIN B-12) 1,000 mcg, oral, Daily    empagliflozin (JARDIANCE) 25 mg, oral, Daily    guaiFENesin (MUCINEX) 1,200 mg, oral, Every 12 hours PRN    hydrOXYzine HCL (Atarax) 25 mg tablet TAKE TWO TABLETS BY MOUTH EVERY 8 HOURS FOR ANXIETY    MILK THISTLE ORAL 1 each, oral, Daily    multivitamin tablet 1 tablet, oral, Daily    PARoxetine (PAXIL) 60 mg, oral, Daily    pravastatin (PRAVACHOL) 20 mg, oral, Daily    traZODone (DESYREL) 200 mg, oral, Daily    Trulicity 4.5 mg/0.5 mL pen injector INJECT 4.5MG UNDER THE SKIN ONCE WEEKLY    TURMERIC ORAL 1 each, oral, Daily    " "zoster vaccine-recombinant adjuvanted (Shingrix) 50 mcg/0.5 mL vaccine 0.5 mL, intramuscular, Every 8 weeks       Glucose   Date/Time Value Ref Range Status   02/24/2025 05:12 AM 74 74 - 99 mg/dL Final   02/23/2025 04:28 AM 87 74 - 99 mg/dL Final   02/22/2025 04:58  (H) 74 - 99 mg/dL Final   02/21/2025 04:57  (H) 74 - 99 mg/dL Final   02/20/2025 10:14  (H) 74 - 99 mg/dL Final   02/20/2025 08:29 PM   Corrected     Comment:     IV Contamination  Corrected result: Previously reported as 262 mg/dL (reference range: 74-99 mg/dL) on 2/21/2025 at 0911 EST.   02/20/2025 04:50  (H) 74 - 99 mg/dL Final   02/20/2025 11:55  (H) 74 - 99 mg/dL Final   02/20/2025 07:28  (H) 74 - 99 mg/dL Final   02/20/2025 04:47  (H) 74 - 99 mg/dL Final     No results found for: \"CPEPTIDE\"  POC HEMOGLOBIN A1c   Date Value Ref Range Status   12/05/2024 7.2 (A) 4.2 - 6.5 % Final   09/05/2024 7.4 (A) 4.2 - 6.5 % Final   06/06/2024 7.7 (A) 4.2 - 6.5 % Final   03/04/2024 8.6 (A) 4.2 - 6.5 % Final     Hemoglobin A1C   Date Value Ref Range Status   02/18/2025 11.2 (H) See comment % Final       Patient Learning/Readiness Assessment:  Pt with patient in [place] [address] in isolation for the flu for 60 minutes.   Discussed HbA1c 11.2% dated 12/18/25.   Discussed goal of A1c less than7%.   Discussed s/s of hypo-hyperglycemia and actions to take, eye/foot care, sickday guidelines, and the importance of taking medications as prescribed.   POCT glucose today 2/24/25 at 12:04 was 202 mg/dl.  States, \"used to wear a Dexcom G7 but her insurance stopped paying for it.  While inpatient getting Jardiance 10 mg q day, Lispro 4 units TID, Lispro SSI 0-15 units TID, Lantus 25 units at bedtime..    Discussed ways to lower HbA1c by healthy diet, increased exercise 30 min.  5 days a week and weight loss but patient states, \"has lost a lot of weight\".      Interventions/Topics Covered:  See After Visit Summary for handouts/information " sheets provided to patient.     Diabetes book given & reviewed with patient.  Planning meals book given.     Trial CGM provided to patient: Dexcom    Education Outcome/Recommendations:        Recommendations for bedside nursing: Allow patient to self-inject insulin (supervised)    Will continue to monitor POCT glucose.

## 2025-02-24 NOTE — CARE PLAN
The patient's goals for the shift include feel better    The clinical goals for the shift include Stable vitals and labs, increase activity as tolerated      Problem: Pain - Adult  Goal: Verbalizes/displays adequate comfort level or baseline comfort level  Flowsheets (Taken 2/23/2025 1048 by Enma Louie RN)  Verbalizes/displays adequate comfort level or baseline comfort level:   Encourage patient to monitor pain and request assistance   Assess pain using appropriate pain scale   Administer analgesics based on type and severity of pain and evaluate response   Implement non-pharmacological measures as appropriate and evaluate response   Consider cultural and social influences on pain and pain management   Notify Licensed Independent Practitioner if interventions unsuccessful or patient reports new pain     Problem: Safety - Adult  Goal: Free from fall injury  Flowsheets (Taken 2/23/2025 1048 by Enma Louie RN)  Free from fall injury: Instruct family/caregiver on patient safety     Problem: Discharge Planning  Goal: Discharge to home or other facility with appropriate resources  Flowsheets (Taken 2/23/2025 1048 by Enma Louie RN)  Discharge to home or other facility with appropriate resources:   Identify barriers to discharge with patient and caregiver   Arrange for needed discharge resources and transportation as appropriate   Identify discharge learning needs (meds, wound care, etc)     Problem: Chronic Conditions and Co-morbidities  Goal: Patient's chronic conditions and co-morbidity symptoms are monitored and maintained or improved  Flowsheets (Taken 2/23/2025 1048 by Enma Louie, RN)  Care Plan - Patient's Chronic Conditions and Co-Morbidity Symptoms are Monitored and Maintained or Improved:   Monitor and assess patient's chronic conditions and comorbid symptoms for stability, deterioration, or improvement   Collaborate with multidisciplinary team to address chronic and comorbid  conditions and prevent exacerbation or deterioration   Update acute care plan with appropriate goals if chronic or comorbid symptoms are exacerbated and prevent overall improvement and discharge     Problem: Skin  Goal: Participates in plan/prevention/treatment measures  Flowsheets (Taken 2/24/2025 0116)  Participates in plan/prevention/treatment measures:   Discuss with provider PT/OT consult   Elevate heels   Increase activity/out of bed for meals  Goal: Prevent/manage excess moisture  Flowsheets (Taken 2/24/2025 0116)  Prevent/manage excess moisture:   Cleanse incontinence/protect with barrier cream   Follow provider orders for dressing changes   Monitor for/manage infection if present

## 2025-02-24 NOTE — PROGRESS NOTES
Desiree Shepard is a 64 y.o. female on day 6 of admission presenting with Influenza A.      Subjective   Here with a few different things.  DKA.  Her hemoglobin A1c is 11 so her baseline control is very poor.  She has been taking Jardiance and Trulicity.    NSTEMI.-Cath clean.    Influenza.-Relatively asymptomatic at this point    Low blood pressure.  Started on midodrine.       Objective     Last Recorded Vitals  BP 92/56 (BP Location: Left arm, Patient Position: Lying)   Pulse 75   Temp 36.3 °C (97.3 °F) (Temporal)   Resp 18   Wt 59.4 kg (130 lb 15.3 oz)   SpO2 99%   Intake/Output last 3 Shifts:    Intake/Output Summary (Last 24 hours) at 2/24/2025 1134  Last data filed at 2/24/2025 0900  Gross per 24 hour   Intake 780 ml   Output --   Net 780 ml       Admission Weight  Weight: 54.4 kg (120 lb) (02/18/25 0945)    Daily Weight  02/24/25 : 59.4 kg (130 lb 15.3 oz)    Image Results  Electrocardiogram 12 Lead  Normal sinus rhythm  Anterior infarct , age undetermined  T wave abnormality, consider lateral ischemia  Prolonged QT  Abnormal ECG  No previous ECGs available  Confirmed by Pradeep Tripp ([id number]) on 2/22/2025 11:33:44 AM      Physical Exam    Physical exam is deferred.  Did meet face-to-face with the patient below.    Assessment/Plan                  Assessment & Plan  NSTEMI (non-ST elevated myocardial infarction) (Multi)  Cardiac cath clean.  Blood pressure has been low.  We started her on midodrine.  I do not know if there is further recommendations coming from cardiology    Dm type 2: A1c 11 on Jardiance and Trulicity.  I explained to the patient that insulin will be mandatory.  She is already been started on Lantus and sliding scale.  Patient suggested if she has to go on insulin why should she bother taking her Trulicity or her Jardiance.  I think she may have a point.  We will focus on optimizing her insulin.  I have emphasized to her that she needs to see an endocrinologist as soon as possible.  I will  up the mealtime insulin a little bit today and consult diabetes nurse education.  I hope to have her discharged to home tomorrow.      Depression/Anxiety:  resume home medications  Influenza A  Symptom management.  Start tamiflu    Acute respiratory failure with hypoxia (Multi)  She is breathing comfortably on room air.  Finish course of Tamiflu.                Thong Crews MD

## 2025-02-24 NOTE — ASSESSMENT & PLAN NOTE
Cardiac cath clean.  Blood pressure has been low.  We started her on midodrine.  I do not know if there is further recommendations coming from cardiology    Dm type 2: A1c 11 on Jardiance and Trulicity.  I explained to the patient that insulin will be mandatory.  She is already been started on Lantus and sliding scale.  Patient suggested if she has to go on insulin why should she bother taking her Trulicity or her Jardiance.  I think she may have a point.  We will focus on optimizing her insulin.  I have emphasized to her that she needs to see an endocrinologist as soon as possible.  I will up the mealtime insulin a little bit today and consult diabetes nurse education.  I hope to have her discharged to home tomorrow.      Depression/Anxiety:  resume home medications

## 2025-02-24 NOTE — CARE PLAN
The patient's goals for the shift include feel better    The clinical goals for the shift include safety

## 2025-02-24 NOTE — NURSING NOTE
Insulin injection teachings complete at this time with return demonstration. No questions at this time.

## 2025-02-25 ENCOUNTER — APPOINTMENT (OUTPATIENT)
Dept: CARDIOLOGY | Facility: HOSPITAL | Age: 65
End: 2025-02-25
Payer: COMMERCIAL

## 2025-02-25 LAB
ALBUMIN SERPL BCP-MCNC: 2.9 G/DL (ref 3.4–5)
ANION GAP SERPL CALCULATED.3IONS-SCNC: 10 MMOL/L (ref 10–20)
ATRIAL RATE: 73 BPM
ATRIAL RATE: 73 BPM
BASOPHILS # BLD AUTO: 0.02 X10*3/UL (ref 0–0.1)
BASOPHILS NFR BLD AUTO: 0.3 %
BUN SERPL-MCNC: 8 MG/DL (ref 6–23)
CALCIUM SERPL-MCNC: 8.2 MG/DL (ref 8.6–10.3)
CHLORIDE SERPL-SCNC: 106 MMOL/L (ref 98–107)
CO2 SERPL-SCNC: 29 MMOL/L (ref 21–32)
CREAT SERPL-MCNC: 0.62 MG/DL (ref 0.5–1.05)
EGFRCR SERPLBLD CKD-EPI 2021: >90 ML/MIN/1.73M*2
EOSINOPHIL # BLD AUTO: 0.13 X10*3/UL (ref 0–0.7)
EOSINOPHIL NFR BLD AUTO: 1.9 %
ERYTHROCYTE [DISTWIDTH] IN BLOOD BY AUTOMATED COUNT: 13 % (ref 11.5–14.5)
GLUCOSE BLD MANUAL STRIP-MCNC: 168 MG/DL (ref 74–99)
GLUCOSE BLD MANUAL STRIP-MCNC: 178 MG/DL (ref 74–99)
GLUCOSE BLD MANUAL STRIP-MCNC: 73 MG/DL (ref 74–99)
GLUCOSE BLD MANUAL STRIP-MCNC: 74 MG/DL (ref 74–99)
GLUCOSE BLD MANUAL STRIP-MCNC: 88 MG/DL (ref 74–99)
GLUCOSE SERPL-MCNC: 56 MG/DL (ref 74–99)
HCT VFR BLD AUTO: 37.5 % (ref 36–46)
HGB BLD-MCNC: 11.9 G/DL (ref 12–16)
IMM GRANULOCYTES # BLD AUTO: 0.02 X10*3/UL (ref 0–0.7)
IMM GRANULOCYTES NFR BLD AUTO: 0.3 % (ref 0–0.9)
LYMPHOCYTES # BLD AUTO: 3.52 X10*3/UL (ref 1.2–4.8)
LYMPHOCYTES NFR BLD AUTO: 52.1 %
MAGNESIUM SERPL-MCNC: 1.68 MG/DL (ref 1.6–2.4)
MCH RBC QN AUTO: 27.5 PG (ref 26–34)
MCHC RBC AUTO-ENTMCNC: 31.7 G/DL (ref 32–36)
MCV RBC AUTO: 87 FL (ref 80–100)
MONOCYTES # BLD AUTO: 0.79 X10*3/UL (ref 0.1–1)
MONOCYTES NFR BLD AUTO: 11.7 %
NEUTROPHILS # BLD AUTO: 2.28 X10*3/UL (ref 1.2–7.7)
NEUTROPHILS NFR BLD AUTO: 33.7 %
NRBC BLD-RTO: 0 /100 WBCS (ref 0–0)
P AXIS: 52 DEGREES
P AXIS: 63 DEGREES
P OFFSET: 199 MS
P OFFSET: 203 MS
P ONSET: 154 MS
P ONSET: 155 MS
PHOSPHATE SERPL-MCNC: 3.7 MG/DL (ref 2.5–4.9)
PLATELET # BLD AUTO: 387 X10*3/UL (ref 150–450)
POTASSIUM SERPL-SCNC: 3.6 MMOL/L (ref 3.5–5.3)
PR INTERVAL: 130 MS
PR INTERVAL: 130 MS
Q ONSET: 219 MS
Q ONSET: 220 MS
QRS COUNT: 12 BEATS
QRS COUNT: 12 BEATS
QRS DURATION: 88 MS
QRS DURATION: 94 MS
QT INTERVAL: 490 MS
QT INTERVAL: 512 MS
QTC CALCULATION(BAZETT): 539 MS
QTC CALCULATION(BAZETT): 564 MS
QTC FREDERICIA: 523 MS
QTC FREDERICIA: 546 MS
R AXIS: 21 DEGREES
R AXIS: 6 DEGREES
RBC # BLD AUTO: 4.32 X10*6/UL (ref 4–5.2)
SODIUM SERPL-SCNC: 141 MMOL/L (ref 136–145)
T AXIS: 156 DEGREES
T AXIS: 179 DEGREES
T OFFSET: 464 MS
T OFFSET: 476 MS
VENTRICULAR RATE: 73 BPM
VENTRICULAR RATE: 73 BPM
WBC # BLD AUTO: 6.8 X10*3/UL (ref 4.4–11.3)

## 2025-02-25 PROCEDURE — 83735 ASSAY OF MAGNESIUM: CPT

## 2025-02-25 PROCEDURE — 2060000001 HC INTERMEDIATE ICU ROOM DAILY

## 2025-02-25 PROCEDURE — 99232 SBSQ HOSP IP/OBS MODERATE 35: CPT | Performed by: INTERNAL MEDICINE

## 2025-02-25 PROCEDURE — 2500000001 HC RX 250 WO HCPCS SELF ADMINISTERED DRUGS (ALT 637 FOR MEDICARE OP): Performed by: INTERNAL MEDICINE

## 2025-02-25 PROCEDURE — 2500000002 HC RX 250 W HCPCS SELF ADMINISTERED DRUGS (ALT 637 FOR MEDICARE OP, ALT 636 FOR OP/ED)

## 2025-02-25 PROCEDURE — 85025 COMPLETE CBC W/AUTO DIFF WBC: CPT

## 2025-02-25 PROCEDURE — 93010 ELECTROCARDIOGRAM REPORT: CPT | Performed by: INTERNAL MEDICINE

## 2025-02-25 PROCEDURE — 2500000001 HC RX 250 WO HCPCS SELF ADMINISTERED DRUGS (ALT 637 FOR MEDICARE OP)

## 2025-02-25 PROCEDURE — 82947 ASSAY GLUCOSE BLOOD QUANT: CPT

## 2025-02-25 PROCEDURE — 36415 COLL VENOUS BLD VENIPUNCTURE: CPT

## 2025-02-25 PROCEDURE — 2500000004 HC RX 250 GENERAL PHARMACY W/ HCPCS (ALT 636 FOR OP/ED)

## 2025-02-25 PROCEDURE — 2500000002 HC RX 250 W HCPCS SELF ADMINISTERED DRUGS (ALT 637 FOR MEDICARE OP, ALT 636 FOR OP/ED): Performed by: INTERNAL MEDICINE

## 2025-02-25 PROCEDURE — 93005 ELECTROCARDIOGRAM TRACING: CPT

## 2025-02-25 PROCEDURE — 80069 RENAL FUNCTION PANEL: CPT

## 2025-02-25 RX ORDER — INSULIN GLARGINE 100 [IU]/ML
15 INJECTION, SOLUTION SUBCUTANEOUS EVERY 24 HOURS
Status: DISCONTINUED | OUTPATIENT
Start: 2025-02-25 | End: 2025-02-26

## 2025-02-25 RX ORDER — METOPROLOL SUCCINATE 25 MG/1
25 TABLET, EXTENDED RELEASE ORAL DAILY
Status: DISCONTINUED | OUTPATIENT
Start: 2025-02-25 | End: 2025-02-27 | Stop reason: HOSPADM

## 2025-02-25 RX ORDER — CEFUROXIME AXETIL 500 MG/1
500 TABLET ORAL 2 TIMES DAILY
Status: DISCONTINUED | OUTPATIENT
Start: 2025-02-25 | End: 2025-02-27 | Stop reason: HOSPADM

## 2025-02-25 RX ADMIN — ENOXAPARIN SODIUM 40 MG: 40 INJECTION SUBCUTANEOUS at 08:21

## 2025-02-25 RX ADMIN — INSULIN LISPRO 1 UNITS: 100 INJECTION, SOLUTION INTRAVENOUS; SUBCUTANEOUS at 17:10

## 2025-02-25 RX ADMIN — PAROXETINE HYDROCHLORIDE 60 MG: 20 TABLET, FILM COATED ORAL at 08:21

## 2025-02-25 RX ADMIN — ASPIRIN 81 MG CHEWABLE TABLET 81 MG: 81 TABLET CHEWABLE at 08:21

## 2025-02-25 RX ADMIN — INSULIN LISPRO 4 UNITS: 100 INJECTION, SOLUTION INTRAVENOUS; SUBCUTANEOUS at 12:40

## 2025-02-25 RX ADMIN — INSULIN LISPRO 1 UNITS: 100 INJECTION, SOLUTION INTRAVENOUS; SUBCUTANEOUS at 12:40

## 2025-02-25 RX ADMIN — EMPAGLIFLOZIN 10 MG: 10 TABLET, FILM COATED ORAL at 08:21

## 2025-02-25 RX ADMIN — METOPROLOL SUCCINATE 25 MG: 25 TABLET, EXTENDED RELEASE ORAL at 12:49

## 2025-02-25 RX ADMIN — CEFUROXIME AXETIL 500 MG: 500 TABLET ORAL at 12:49

## 2025-02-25 RX ADMIN — CEFUROXIME AXETIL 500 MG: 500 TABLET ORAL at 21:16

## 2025-02-25 RX ADMIN — TRAZODONE HYDROCHLORIDE 200 MG: 100 TABLET ORAL at 21:16

## 2025-02-25 RX ADMIN — INSULIN LISPRO 4 UNITS: 100 INJECTION, SOLUTION INTRAVENOUS; SUBCUTANEOUS at 17:10

## 2025-02-25 ASSESSMENT — COGNITIVE AND FUNCTIONAL STATUS - GENERAL
MOBILITY SCORE: 24
DAILY ACTIVITIY SCORE: 24
MOBILITY SCORE: 24
DAILY ACTIVITIY SCORE: 24

## 2025-02-25 ASSESSMENT — PAIN SCALES - GENERAL
PAINLEVEL_OUTOF10: 0 - NO PAIN

## 2025-02-25 ASSESSMENT — PAIN - FUNCTIONAL ASSESSMENT
PAIN_FUNCTIONAL_ASSESSMENT: 0-10
PAIN_FUNCTIONAL_ASSESSMENT: FLACC (FACE, LEGS, ACTIVITY, CRY, CONSOLABILITY)
PAIN_FUNCTIONAL_ASSESSMENT: 0-10

## 2025-02-25 NOTE — CARE PLAN
The patient's goals for the shift include discharge?    The clinical goals for the shift include maintain stable vitals, demonstrate lantus injection

## 2025-02-25 NOTE — NURSING NOTE
Rounded on pt. Pt on her way to bathroom, ambulating steady and independently. Pt denies pain and has no needs or complaints at this time. Uneventful night with no changes since prior assessment. Pt oriented to call bell Telemetry leads connected and reading on monitor. Bed alarm refused. Continuing to monitor.

## 2025-02-25 NOTE — PROGRESS NOTES
Desiree Shepard is a 64 y.o. female on day 7 of admission presenting with Influenza A.      Subjective   Covering from influenza A.  Patiently evaluated and treated for NSTEMI.  Her diabetes has been out of control at home with a hemoglobin A1c of 11 so we have started her on insulin.  She feels well today.  Blood sugars have been repeatedly low first thing in the morning after getting Lantus at bedtime       Objective   Alert oriented undistressed  Heart regular rate and rhythm  Lungs clear to auscultation  Extremities no edema.  Last Recorded Vitals  BP (!) 99/45 (BP Location: Left arm, Patient Position: Lying)   Pulse 72   Temp 36.5 °C (97.7 °F) (Oral)   Resp 16   Wt 60.1 kg (132 lb 7.9 oz)   SpO2 96%   Intake/Output last 3 Shifts:    Intake/Output Summary (Last 24 hours) at 2/25/2025 1049  Last data filed at 2/25/2025 0900  Gross per 24 hour   Intake 1480 ml   Output --   Net 1480 ml       Admission Weight  Weight: 54.4 kg (120 lb) (02/18/25 0945)    Daily Weight  02/25/25 : 60.1 kg (132 lb 7.9 oz)    Image Results  ECG 12 Lead  Normal sinus rhythm  T wave abnormality, consider inferior ischemia  T wave abnormality, consider anterolateral ischemia  Prolonged QT  Abnormal ECG  When compared with ECG of 24-FEB-2025 12:55, (unconfirmed)  No significant change was found  Confirmed by Freya June ([id number]) on 2/25/2025 9:00:40 AM  ECG 12 Lead  Normal sinus rhythm  T wave abnormality, consider inferior ischemia  T wave abnormality, consider anterolateral ischemia  Prolonged QT  Abnormal ECG  When compared with ECG of 22-FEB-2025 10:33, (unconfirmed)  No significant change was found  Confirmed by Freya June ([id number]) on 2/25/2025 8:49:31 AM          Assessment/Plan                  Assessment & Plan  NSTEMI (non-ST elevated myocardial infarction) (Multi)  Cardiac cath clean.  Blood pressure has been low.  We started her on midodrine.  Cardiology talking about stopping midodrine and starting her on things like Entresto  and carvedilol.  I will defer to them on this.      Dm type 2: A1c 11 on Jardiance and Trulicity.  She has been started on insulin.  I am lowering the Lantus due to hypoglycemia in the morning.  Depression/Anxiety:  resume home medications  Influenza A  Has finished a course of Tamiflu but now she is complaining of new green mucus coming from her nose.  Will put her on cefuroxime.    Acute respiratory failure with hypoxia (Multi)  Resolved.    Hopefully tomorrow cardiology will finalize her home-going meds and I will finalize her home-going insulin and she can be discharged home.                Thong Crews MD

## 2025-02-25 NOTE — PROGRESS NOTES
Subjective Data:  Patient is doing good.  Nuys any chest pain.  No shortness of breath.    Overnight Events:    Telemetry overnight reviewed no events     Objective Data:  Last Recorded Vitals:  Vitals:    02/25/25 0357 02/25/25 0600 02/25/25 0739 02/25/25 1116   BP: (!) 88/43  (!) 99/45 101/54   BP Location: Right arm  Left arm Left arm   Patient Position: Lying  Lying Lying   Pulse:   72 80   Resp: 16  16 16   Temp: 36.4 °C (97.5 °F)  36.5 °C (97.7 °F) 36.5 °C (97.7 °F)   TempSrc: Oral  Oral Temporal   SpO2: 100%  96% 99%   Weight: 60.1 kg (132 lb 7.9 oz) 60.1 kg (132 lb 7.9 oz)     Height:           Last Labs:  CBC - 2/25/2025:  5:12 AM  6.8 11.9 387    37.5      CMP - 2/25/2025:  5:12 AM  8.2 6.3 34 --- 0.4   3.7 2.9 22 87      PTT - 2/20/2025:  4:47 AM  _   _ 44.0     TROPHS   Date/Time Value Ref Range Status   02/22/2025 04:58  0 - 13 ng/L Final   02/20/2025 07:28 AM 1,788 0 - 13 ng/L Final   02/18/2025 11:14  0 - 13 ng/L Final     Comment:     Previous result verified on 2/18/2025 1050 on specimen/case [id number] called with component [facility] for procedure Troponin I, High Sensitivity, Initial with value 382 ng/L.     BNP   Date/Time Value Ref Range Status   02/18/2025 12:35 PM 90 0 - 99 pg/mL Final     HGBA1C   Date/Time Value Ref Range Status   02/18/2025 12:35 PM 11.2 See comment % Final   12/05/2024 04:01 PM 7.2 4.2 - 6.5 % Final   09/05/2024 04:13 PM 7.4 4.2 - 6.5 % Final     LDLCALC   Date/Time Value Ref Range Status   02/19/2025 04:53 AM   Final     Comment:     The calculation of LDL and VLDL are inaccurate when the Triglycerides are greater than 400 mg/dL or when the patient is non-fasting. If LDL measurement is necessary contact the testing laboratory for an alternative LDL assay.                                  Near   Borderline      AGE      Desirable  Optimal    High     High     Very High     0-19 Y     0 - 109     ---    110-129   >/= 130     ----    20-24 Y     0 - 119     ---     120-159   >/= 160     ----      >24 Y     0 -  99   100-129  130-159   160-189     >/=190     10/18/2024 10:40  <=99 mg/dL Final     Comment:                                 Near   Borderline      AGE      Desirable  Optimal    High     High     Very High     0-19 Y     0 - 109     ---    110-129   >/= 130     ----    20-24 Y     0 - 119     ---    120-159   >/= 160     ----      >24 Y     0 -  99   100-129  130-159   160-189     >/=190     09/19/2023 09:00  65 - 130 MG/DL Final   09/21/2022 02:13 PM 97 65 - 130 MG/DL Final   03/25/2021 04:24  65 - 130 MG/DL Final     VLDL   Date/Time Value Ref Range Status   02/19/2025 04:53 AM   Final     Comment:     Unable to calculate VLDL.   10/18/2024 10:40 AM 21 0 - 40 mg/dL Final      Last I/O:  I/O last 3 completed shifts:  In: 1300 (21.6 mL/kg) [P.O.:1300]  Out: - (0 mL/kg)   Weight: 60.1 kg     Past Cardiology Tests (Last 3 Years):  EKG:  Electrocardiogram 12 Lead 02/20/2025      ECG 12 Lead 02/20/2025      ECG 12 lead 02/20/2025      ECG 12 Lead       ECG 12 lead 02/18/2025      ECG 12 lead 02/18/2025      ECG 12 lead (Clinic Performed)     Echo:  Transthoracic Echo (TTE) Complete 02/18/2025    Ejection Fractions:  EF   Date/Time Value Ref Range Status   02/18/2025 03:41 PM 33 %      Cath:  Cardiac Catheterization Procedure 02/20/2025    Stress Test:  No results found for this or any previous visit from the past 1095 days.    Cardiac Imaging:  No results found for this or any previous visit from the past 1095 days.      Inpatient Medications:  Scheduled medications   Medication Dose Route Frequency    aspirin  81 mg oral Daily    cefuroxime  500 mg oral BID    empagliflozin  10 mg oral Daily    enoxaparin  40 mg subcutaneous Daily    insulin glargine  15 Units subcutaneous q24h    insulin lispro  0-5 Units subcutaneous TID AC    insulin lispro  4 Units subcutaneous TID AC    midodrine  5 mg oral TID    PARoxetine  60 mg oral Daily    traZODone  200  mg oral Nightly     PRN medications   Medication    acetaminophen    benzonatate    dextromethorphan-guaifenesin    dextrose    dextrose    glucagon    glucagon    hydrOXYzine HCL    ipratropium-albuteroL    morphine    polyethylene glycol    prochlorperazine     Continuous Medications   Medication Dose Last Rate       Physical Exam:    General: Patient is in no acute distress.  HEENT: atraumatic normocephalic.  Neck: is supple jugular venous pressure within normal limits no thyromegaly.  Cardiovascular regular rate and rhythm normal heart sounds no murmurs rubs or gallops.  Lungs: clear to auscultation bilaterally.  Abdomen: is soft nontender.  Extremities warm to touch no edema.           Assessment/Plan  2/23:  #1. acute non-ST elevation myocardial infarction.  Echocardiogram showed apical and anterior wall severe hypokinesis.  Cardiac catheterization showed no significant coronary disease with LINDA-3 flow.  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  Troponin elevated.  She may have myocarditis.  No indication for aspirin or NSAIDs.  Occasions for steroids.  No significant arrhythmias.  We will monitor for now.  She is weaning off pressors.  Keep maps above 65 mmHg.  Patient renal function stable.  No indication for heparin drip.  Will monitor.  Patient is not in cardiogenic shock.  I will hold off beta-blockers for today since her blood pressure remains on the low side.  Blood pressure better with the midodrine.  Maybe start beta-blocker tomorrow patient usually has low blood pressure at baseline.      2. Influenza A infection management by primary team.     3.  Hypotension.  Patient has underlying history of blood pressure running on the low side.  I will start on midodrine 10 mg oral daily.  Wean her off Levophed if she is on any.  Her renal function stable.  Monitor renal function and urine output.    2/24: The patient was seen events reviewed in detail.  This patient does  have a prior history of low normal blood pressure readings type 2 diabetes hyperlipidemia.  She was admitted with influenza A with echocardiogram showing moderately severe LV impairment estimated ejection fraction of 30-35%.  EKG tracings with diffuse precordial and limb lead T wave inversion suggestive of Takotsubo cardiomyopathy.  The cardiac catheterization demonstrated angiographically normal coronary arteries.  Patient transferred from intensive care unit to stepdown unit placed on midodrine 10 mg 3 times daily which we will attempt to wean over the next 24 to 48 hours as long as systolic blood pressures are greater than 85 mmHg.  Following that we will attempt to resume very low-dose guideline directed medical therapy including low-dose beta-blockade and Entresto.  Patient had been on Jardiance prior to admission 25 mg daily will be restarted at 10 mg daily.  Will check serial EKG tracings.    2/25: The patient is resting comfortably no major complaints.  She does have a sister with a variety of hematologic abnormalities including prothrombin protein gene mutation along with TTP ITP and Sjogren syndrome.  The patient's EKG tracings continued to demonstrate signs of a Takotsubo cardiomyopathy with diffuse precordial and limb lead T wave inversions with QT prolongation.  Systolic blood pressures are in the 100 mmHg range are slightly lower.  Will discontinue midodrine and initiate very low-dose Toprol-XL 25 mg daily.  Will not utilize entresto at this time.  Anticipate that the patient will be prepared for discharge tomorrow and will arrange for follow-up and a repeat echocardiogram in 6 to 12 weeks to hopefully confirm return to normal LV ejection fraction.  Repeat CBC from today is unremarkable and the renal parameters are in normal range as well with a creatinine of 0.62.  Peripheral IV 02/19/25 20 G Left;Anterior Forearm (Active)   Site Assessment Clean;Dry;Intact 02/23/25 0400   Dressing Type Transparent  02/23/25 0400   Line Status Flushed;Saline locked 02/23/25 0400   Dressing Status Clean;Dry;Occlusive 02/23/25 0400   Number of days: 4       Peripheral IV 02/20/25 20 G Left;Proximal;Anterior;Upper Forearm (Active)   Site Assessment Clean;Dry;Intact 02/23/25 0400   Dressing Type Transparent 02/23/25 0400   Line Status Flushed;Saline locked 02/23/25 0400   Dressing Status Clean;Dry;Occlusive 02/23/25 0400   Number of days: 3       Code Status:  Full Code        Pradeep Tripp MD

## 2025-02-25 NOTE — ASSESSMENT & PLAN NOTE
Has finished a course of Tamiflu but now she is complaining of new green mucus coming from her nose.  Will put her on cefuroxime.

## 2025-02-25 NOTE — NURSING NOTE
Took over care of pt at this time. Pt laying in bed, turned towards window, eyes closed, respirations even and unlabored. Pt appears to be resting in no apparent pain or distress. Pt turns as I approach bed, alert, aox4. Pt denies pain and has no needs or complaints at this time. Pt oriented to call bell and it and belongings are placed in reach. Telemetry leads connected and reading on monitor. Bed alarm refused. Continuing to monitor.

## 2025-02-25 NOTE — CARE PLAN
Problem: Pain - Adult  Goal: Verbalizes/displays adequate comfort level or baseline comfort level  Outcome: Progressing     Problem: Safety - Adult  Goal: Free from fall injury  Outcome: Progressing     Problem: Discharge Planning  Goal: Discharge to home or other facility with appropriate resources  Outcome: Progressing     Problem: Chronic Conditions and Co-morbidities  Goal: Patient's chronic conditions and co-morbidity symptoms are monitored and maintained or improved  Outcome: Progressing     Problem: Nutrition  Goal: Nutrient intake appropriate for maintaining nutritional needs  Outcome: Progressing     Problem: Diabetes  Goal: Achieve decreasing blood glucose levels by end of shift  Outcome: Progressing  Goal: Increase stability of blood glucose readings by end of shift  Outcome: Progressing  Goal: Maintain electrolyte levels within acceptable range throughout shift  Outcome: Progressing  Goal: Maintain glucose levels >70mg/dl to <250mg/dl throughout shift  Outcome: Progressing  Goal: No changes in neurological exam by end of shift  Outcome: Progressing  Goal: Learn about and adhere to nutrition recommendations by end of shift  Outcome: Progressing  Goal: Vital signs within normal range for age by end of shift  Outcome: Progressing  Goal: Increase self care and/or family involovement by end of shift  Outcome: Progressing  Goal: Receive DSME education by end of shift  Outcome: Progressing     Problem: Skin  Goal: Participates in plan/prevention/treatment measures  Outcome: Progressing  Goal: Prevent/manage excess moisture  Outcome: Progressing  Goal: Prevent/minimize sheer/friction injuries  Outcome: Progressing  Flowsheets (Taken 2/25/2025 0431)  Prevent/minimize sheer/friction injuries:   Use pull sheet   Increase activity/out of bed for meals  Goal: Promote/optimize nutrition  Outcome: Progressing     Problem: Fall/Injury  Goal: Not fall by end of shift  Outcome: Progressing  Goal: Be free from injury by  end of the shift  Outcome: Progressing  Goal: Verbalize understanding of personal risk factors for fall in the hospital  Outcome: Progressing  Goal: Verbalize understanding of risk factor reduction measures to prevent injury from fall in the home  Outcome: Progressing     Problem: Pain  Goal: Takes deep breaths with improved pain control throughout the shift  Outcome: Progressing  Goal: Turns in bed with improved pain control throughout the shift  Outcome: Progressing  Goal: Walks with improved pain control throughout the shift  Outcome: Progressing  Goal: Performs ADL's with improved pain control throughout shift  Outcome: Progressing  Goal: Participates in PT with improved pain control throughout the shift  Outcome: Progressing   The patient's goals for the shift include feel better    The clinical goals for the shift include maintain stable vitals, demonstrate lantus injection    Over the shift, the patient did make progress toward the goals.

## 2025-02-25 NOTE — ASSESSMENT & PLAN NOTE
Resolved.    Hopefully tomorrow cardiology will finalize her home-going meds and I will finalize her home-going insulin and she can be discharged home.

## 2025-02-25 NOTE — ASSESSMENT & PLAN NOTE
Cardiac cath clean.  Blood pressure has been low.  We started her on midodrine.  Cardiology talking about stopping midodrine and starting her on things like Entresto and carvedilol.  I will defer to them on this.      Dm type 2: A1c 11 on Jardiance and Trulicity.  She has been started on insulin.  I am lowering the Lantus due to hypoglycemia in the morning.  Depression/Anxiety:  resume home medications

## 2025-02-26 ENCOUNTER — APPOINTMENT (OUTPATIENT)
Dept: CARDIOLOGY | Facility: HOSPITAL | Age: 65
End: 2025-02-26
Payer: COMMERCIAL

## 2025-02-26 ENCOUNTER — APPOINTMENT (OUTPATIENT)
Dept: PRIMARY CARE | Facility: CLINIC | Age: 65
End: 2025-02-26
Payer: COMMERCIAL

## 2025-02-26 LAB
ALBUMIN SERPL BCP-MCNC: 3 G/DL (ref 3.4–5)
ANION GAP SERPL CALCULATED.3IONS-SCNC: 10 MMOL/L (ref 10–20)
ATRIAL RATE: 64 BPM
BASOPHILS # BLD AUTO: 0.03 X10*3/UL (ref 0–0.1)
BASOPHILS NFR BLD AUTO: 0.5 %
BUN SERPL-MCNC: 10 MG/DL (ref 6–23)
CALCIUM SERPL-MCNC: 8.2 MG/DL (ref 8.6–10.3)
CHLORIDE SERPL-SCNC: 105 MMOL/L (ref 98–107)
CO2 SERPL-SCNC: 28 MMOL/L (ref 21–32)
CREAT SERPL-MCNC: 0.68 MG/DL (ref 0.5–1.05)
EGFRCR SERPLBLD CKD-EPI 2021: >90 ML/MIN/1.73M*2
EOSINOPHIL # BLD AUTO: 0.12 X10*3/UL (ref 0–0.7)
EOSINOPHIL NFR BLD AUTO: 2 %
ERYTHROCYTE [DISTWIDTH] IN BLOOD BY AUTOMATED COUNT: 13 % (ref 11.5–14.5)
GLUCOSE BLD MANUAL STRIP-MCNC: 117 MG/DL (ref 74–99)
GLUCOSE BLD MANUAL STRIP-MCNC: 152 MG/DL (ref 74–99)
GLUCOSE BLD MANUAL STRIP-MCNC: 67 MG/DL (ref 74–99)
GLUCOSE BLD MANUAL STRIP-MCNC: 85 MG/DL (ref 74–99)
GLUCOSE SERPL-MCNC: 81 MG/DL (ref 74–99)
HCT VFR BLD AUTO: 35.7 % (ref 36–46)
HGB BLD-MCNC: 11.3 G/DL (ref 12–16)
IMM GRANULOCYTES # BLD AUTO: 0.02 X10*3/UL (ref 0–0.7)
IMM GRANULOCYTES NFR BLD AUTO: 0.3 % (ref 0–0.9)
LYMPHOCYTES # BLD AUTO: 3.2 X10*3/UL (ref 1.2–4.8)
LYMPHOCYTES NFR BLD AUTO: 52.6 %
MAGNESIUM SERPL-MCNC: 1.84 MG/DL (ref 1.6–2.4)
MCH RBC QN AUTO: 27.6 PG (ref 26–34)
MCHC RBC AUTO-ENTMCNC: 31.7 G/DL (ref 32–36)
MCV RBC AUTO: 87 FL (ref 80–100)
MONOCYTES # BLD AUTO: 0.62 X10*3/UL (ref 0.1–1)
MONOCYTES NFR BLD AUTO: 10.2 %
NEUTROPHILS # BLD AUTO: 2.09 X10*3/UL (ref 1.2–7.7)
NEUTROPHILS NFR BLD AUTO: 34.4 %
NRBC BLD-RTO: 0 /100 WBCS (ref 0–0)
P AXIS: 66 DEGREES
P OFFSET: 203 MS
P ONSET: 153 MS
PHOSPHATE SERPL-MCNC: 4.5 MG/DL (ref 2.5–4.9)
PLATELET # BLD AUTO: 408 X10*3/UL (ref 150–450)
POTASSIUM SERPL-SCNC: 3.9 MMOL/L (ref 3.5–5.3)
PR INTERVAL: 136 MS
Q ONSET: 221 MS
QRS COUNT: 10 BEATS
QRS DURATION: 90 MS
QT INTERVAL: 546 MS
QTC CALCULATION(BAZETT): 563 MS
QTC FREDERICIA: 557 MS
R AXIS: 16 DEGREES
RBC # BLD AUTO: 4.1 X10*6/UL (ref 4–5.2)
SODIUM SERPL-SCNC: 139 MMOL/L (ref 136–145)
T AXIS: 173 DEGREES
T OFFSET: 494 MS
VENTRICULAR RATE: 64 BPM
WBC # BLD AUTO: 6.1 X10*3/UL (ref 4.4–11.3)

## 2025-02-26 PROCEDURE — 2500000001 HC RX 250 WO HCPCS SELF ADMINISTERED DRUGS (ALT 637 FOR MEDICARE OP): Performed by: INTERNAL MEDICINE

## 2025-02-26 PROCEDURE — 82947 ASSAY GLUCOSE BLOOD QUANT: CPT

## 2025-02-26 PROCEDURE — 93010 ELECTROCARDIOGRAM REPORT: CPT | Performed by: INTERNAL MEDICINE

## 2025-02-26 PROCEDURE — 80069 RENAL FUNCTION PANEL: CPT

## 2025-02-26 PROCEDURE — 2500000002 HC RX 250 W HCPCS SELF ADMINISTERED DRUGS (ALT 637 FOR MEDICARE OP, ALT 636 FOR OP/ED)

## 2025-02-26 PROCEDURE — 99232 SBSQ HOSP IP/OBS MODERATE 35: CPT | Performed by: INTERNAL MEDICINE

## 2025-02-26 PROCEDURE — 83735 ASSAY OF MAGNESIUM: CPT

## 2025-02-26 PROCEDURE — 93005 ELECTROCARDIOGRAM TRACING: CPT

## 2025-02-26 PROCEDURE — 2500000002 HC RX 250 W HCPCS SELF ADMINISTERED DRUGS (ALT 637 FOR MEDICARE OP, ALT 636 FOR OP/ED): Performed by: INTERNAL MEDICINE

## 2025-02-26 PROCEDURE — 2500000001 HC RX 250 WO HCPCS SELF ADMINISTERED DRUGS (ALT 637 FOR MEDICARE OP)

## 2025-02-26 PROCEDURE — 2060000001 HC INTERMEDIATE ICU ROOM DAILY

## 2025-02-26 PROCEDURE — 2500000001 HC RX 250 WO HCPCS SELF ADMINISTERED DRUGS (ALT 637 FOR MEDICARE OP): Performed by: REGISTERED NURSE

## 2025-02-26 PROCEDURE — 2500000004 HC RX 250 GENERAL PHARMACY W/ HCPCS (ALT 636 FOR OP/ED)

## 2025-02-26 PROCEDURE — 85025 COMPLETE CBC W/AUTO DIFF WBC: CPT

## 2025-02-26 PROCEDURE — 36415 COLL VENOUS BLD VENIPUNCTURE: CPT

## 2025-02-26 RX ORDER — INSULIN GLARGINE 100 [IU]/ML
12 INJECTION, SOLUTION SUBCUTANEOUS ONCE
Status: COMPLETED | OUTPATIENT
Start: 2025-02-26 | End: 2025-02-26

## 2025-02-26 RX ORDER — INSULIN GLARGINE 100 [IU]/ML
15 INJECTION, SOLUTION SUBCUTANEOUS EVERY 24 HOURS
Status: DISCONTINUED | OUTPATIENT
Start: 2025-02-26 | End: 2025-02-27 | Stop reason: HOSPADM

## 2025-02-26 RX ADMIN — INSULIN GLARGINE 12 UNITS: 100 INJECTION, SOLUTION SUBCUTANEOUS at 14:31

## 2025-02-26 RX ADMIN — BENZONATATE 100 MG: 100 CAPSULE ORAL at 22:16

## 2025-02-26 RX ADMIN — EMPAGLIFLOZIN 10 MG: 10 TABLET, FILM COATED ORAL at 09:08

## 2025-02-26 RX ADMIN — CEFUROXIME AXETIL 500 MG: 500 TABLET ORAL at 09:08

## 2025-02-26 RX ADMIN — INSULIN LISPRO 4 UNITS: 100 INJECTION, SOLUTION INTRAVENOUS; SUBCUTANEOUS at 11:48

## 2025-02-26 RX ADMIN — TRAZODONE HYDROCHLORIDE 200 MG: 100 TABLET ORAL at 22:00

## 2025-02-26 RX ADMIN — BENZOCAINE AND MENTHOL 1 LOZENGE: 15; 3.6 LOZENGE ORAL at 22:16

## 2025-02-26 RX ADMIN — ASPIRIN 81 MG CHEWABLE TABLET 81 MG: 81 TABLET CHEWABLE at 09:08

## 2025-02-26 RX ADMIN — PAROXETINE HYDROCHLORIDE 60 MG: 20 TABLET, FILM COATED ORAL at 09:08

## 2025-02-26 RX ADMIN — CEFUROXIME AXETIL 500 MG: 500 TABLET ORAL at 22:00

## 2025-02-26 RX ADMIN — ENOXAPARIN SODIUM 40 MG: 40 INJECTION SUBCUTANEOUS at 09:08

## 2025-02-26 ASSESSMENT — COGNITIVE AND FUNCTIONAL STATUS - GENERAL
MOBILITY SCORE: 24
DAILY ACTIVITIY SCORE: 24

## 2025-02-26 ASSESSMENT — PAIN - FUNCTIONAL ASSESSMENT
PAIN_FUNCTIONAL_ASSESSMENT: 0-10
PAIN_FUNCTIONAL_ASSESSMENT: 0-10
PAIN_FUNCTIONAL_ASSESSMENT: FLACC (FACE, LEGS, ACTIVITY, CRY, CONSOLABILITY)

## 2025-02-26 ASSESSMENT — PAIN SCALES - GENERAL
PAINLEVEL_OUTOF10: 0 - NO PAIN

## 2025-02-26 NOTE — PROGRESS NOTES
Pt anticipated to dc on 2/27, home without further skilled needs.      02/26/25 1043   Discharge Planning   Expected Discharge Disposition Home

## 2025-02-26 NOTE — NURSING NOTE
Took over care of pt at this time. Pt laying in bed, alert, aox4. Pt denies pain and has no needs or complaints at this time. Pt oriented to call bell and it and belongings are placed in reach. Telemetry leads connected and reading on monitor. Bed alarm refused. Continuing to monitor.

## 2025-02-26 NOTE — CARE PLAN
Problem: Pain - Adult  Goal: Verbalizes/displays adequate comfort level or baseline comfort level  Outcome: Progressing     Problem: Safety - Adult  Goal: Free from fall injury  Outcome: Progressing     Problem: Discharge Planning  Goal: Discharge to home or other facility with appropriate resources  Outcome: Progressing     Problem: Chronic Conditions and Co-morbidities  Goal: Patient's chronic conditions and co-morbidity symptoms are monitored and maintained or improved  Outcome: Progressing     Problem: Nutrition  Goal: Nutrient intake appropriate for maintaining nutritional needs  Outcome: Progressing     Problem: Diabetes  Goal: Achieve decreasing blood glucose levels by end of shift  Outcome: Progressing  Goal: Increase stability of blood glucose readings by end of shift  Outcome: Progressing  Goal: Maintain electrolyte levels within acceptable range throughout shift  Outcome: Progressing  Goal: Maintain glucose levels >70mg/dl to <250mg/dl throughout shift  Outcome: Progressing  Goal: No changes in neurological exam by end of shift  Outcome: Progressing  Goal: Learn about and adhere to nutrition recommendations by end of shift  Outcome: Progressing  Goal: Vital signs within normal range for age by end of shift  Outcome: Progressing  Goal: Increase self care and/or family involovement by end of shift  Outcome: Progressing  Goal: Receive DSME education by end of shift  Outcome: Progressing     Problem: Skin  Goal: Participates in plan/prevention/treatment measures  Outcome: Progressing  Goal: Prevent/manage excess moisture  Outcome: Progressing  Goal: Prevent/minimize sheer/friction injuries  Outcome: Progressing  Flowsheets (Taken 2/26/2025 0347)  Prevent/minimize sheer/friction injuries: Use pull sheet  Goal: Promote/optimize nutrition  Outcome: Progressing     Problem: Fall/Injury  Goal: Not fall by end of shift  Outcome: Progressing  Goal: Be free from injury by end of the shift  Outcome:  Progressing  Goal: Verbalize understanding of personal risk factors for fall in the hospital  Outcome: Progressing  Goal: Verbalize understanding of risk factor reduction measures to prevent injury from fall in the home  Outcome: Progressing     Problem: Pain  Goal: Takes deep breaths with improved pain control throughout the shift  Outcome: Progressing  Goal: Turns in bed with improved pain control throughout the shift  Outcome: Progressing  Goal: Walks with improved pain control throughout the shift  Outcome: Progressing  Goal: Performs ADL's with improved pain control throughout shift  Outcome: Progressing  Goal: Participates in PT with improved pain control throughout the shift  Outcome: Progressing   The patient's goals for the shift include feel better    The clinical goals for the shift include maintain stable vitals and blood glucose    Over the shift, the patient did make progress toward the goals.

## 2025-02-26 NOTE — CARE PLAN
The patient's goals for the shift include monitor bp and bs    The clinical goals for the shift include maintain stable vitals and blood glucose

## 2025-02-26 NOTE — NURSING NOTE
Rounded on pt. Pt on her way to bathroom, ambulating steady and independently. Pt denies pain and has no needs or complaints at this time. Pt oriented to call bell Telemetry leads connected and reading on monitor. Bed alarm refused. Continuing to monitor.

## 2025-02-26 NOTE — NURSING NOTE
Secure chat with Dr. Sears, hospitalist, at this time:    Me 2108: Pt's blood sugar is 88 for HS. Dayshift adjusted her lantus to 15 units. How should we proceed? Normally we encourage a snack but she ended up low this morning after 20 units of Lantus last night with a sugar of 183.     2109 Dr. Sears: Hold please     2111 Me: Thank you! I will hold it.     2112 Dr. Sears: [thumbs up last post]

## 2025-02-26 NOTE — ASSESSMENT & PLAN NOTE
Cardiac cath clean.  Blood pressure has been low.   Cardiology has adjusted her meds today and is tolerating low blood pressure    Dm type 2: A1c 11 on Jardiance and Trulicity.  She has been started on insulin.  She has not received any Lantus since 2 nights ago.  Will have her get 12 units now and see how that works out.

## 2025-02-26 NOTE — PROGRESS NOTES
Desiree Shepard is a 64 y.o. female on day 8 of admission presenting with Influenza A.      Subjective   She has recovered from flu and respiratory failure.  Cardiology adjusting her meds a bit today.  I am working on her home-going insulin regimen.  She did not get the reduced dose of Lantus last night because her blood sugar was low.  Last dose of Lantus was 25 units 2 nights ago.  This morning her blood sugar was 85.       Objective alert oriented undistressed  Heart regular rate and rhythm  Lungs clear to auscultation  Abdomen soft nontender nondistended  Extremities no edema    Last Recorded Vitals  /55 (BP Location: Left arm, Patient Position: Lying)   Pulse 70   Temp 36.9 °C (98.4 °F) (Oral)   Resp 16   Wt 59.5 kg (131 lb 2.8 oz)   SpO2 98%   Intake/Output last 3 Shifts:    Intake/Output Summary (Last 24 hours) at 2/26/2025 1303  Last data filed at 2/26/2025 0900  Gross per 24 hour   Intake 710 ml   Output --   Net 710 ml       Admission Weight  Weight: 54.4 kg (120 lb) (02/18/25 0945)    Daily Weight  02/26/25 : 59.5 kg (131 lb 2.8 oz)    Image Results  ECG 12 Lead  Normal sinus rhythm  T wave abnormality, consider inferior ischemia  T wave abnormality, consider anterolateral ischemia  Prolonged QT  Abnormal ECG  When compared with ECG of 25-FEB-2025 06:51,  No significant change was found  Confirmed by Freya June ([id number]) on 2/26/2025 9:37:58 AM             Assessment/Plan                  Assessment & Plan  NSTEMI (non-ST elevated myocardial infarction) (Multi)  Cardiac cath clean.  Blood pressure has been low.   Cardiology has adjusted her meds today and is tolerating low blood pressure    Dm type 2: A1c 11 on Jardiance and Trulicity.  She has been started on insulin.  She has not received any Lantus since 2 nights ago.  Will have her get 12 units now and see how that works out.  Influenza A  Has finished a course of Tamiflu but now she is complaining of new green mucus coming from her nose.  Will  put her on cefuroxime.    Acute respiratory failure with hypoxia (Multi)  Resolved.    Need to refine her insulin regimen before I can discharge her.                Thong Crews MD

## 2025-02-26 NOTE — PROGRESS NOTES
Subjective Data:  Patient is doing good.  Nuys any chest pain.  No shortness of breath.    Overnight Events:    Telemetry overnight reviewed no events     Objective Data:  Last Recorded Vitals:  Vitals:    02/26/25 0600 02/26/25 0743 02/26/25 0751 02/26/25 1126   BP:  (!) 82/42 (!) 86/38 112/55   BP Location:  Left arm Right arm Left arm   Patient Position:  Lying  Lying   Pulse:  67  70   Resp:  16  16   Temp:  36.6 °C (97.9 °F)  36.9 °C (98.4 °F)   TempSrc:  Oral  Oral   SpO2:  97%  98%   Weight: 59.5 kg (131 lb 2.8 oz)      Height:           Last Labs:  CBC - 2/26/2025:  4:40 AM  6.1 11.3 408    35.7      CMP - 2/26/2025:  4:40 AM  8.2 6.3 34 --- 0.4   4.5 3.0 22 87      PTT - 2/20/2025:  4:47 AM  _   _ 44.0     TROPHS   Date/Time Value Ref Range Status   02/22/2025 04:58  0 - 13 ng/L Final   02/20/2025 07:28 AM 1,788 0 - 13 ng/L Final   02/18/2025 11:14  0 - 13 ng/L Final     Comment:     Previous result verified on 2/18/2025 1050 on specimen/case [id number] called with component [facility] for procedure Troponin I, High Sensitivity, Initial with value 382 ng/L.     BNP   Date/Time Value Ref Range Status   02/18/2025 12:35 PM 90 0 - 99 pg/mL Final     HGBA1C   Date/Time Value Ref Range Status   02/18/2025 12:35 PM 11.2 See comment % Final   12/05/2024 04:01 PM 7.2 4.2 - 6.5 % Final   09/05/2024 04:13 PM 7.4 4.2 - 6.5 % Final     LDLCALC   Date/Time Value Ref Range Status   02/19/2025 04:53 AM   Final     Comment:     The calculation of LDL and VLDL are inaccurate when the Triglycerides are greater than 400 mg/dL or when the patient is non-fasting. If LDL measurement is necessary contact the testing laboratory for an alternative LDL assay.                                  Near   Borderline      AGE      Desirable  Optimal    High     High     Very High     0-19 Y     0 - 109     ---    110-129   >/= 130     ----    20-24 Y     0 - 119     ---    120-159   >/= 160     ----      >24 Y     0 -  99    100-129  130-159   160-189     >/=190     10/18/2024 10:40  <=99 mg/dL Final     Comment:                                 Near   Borderline      AGE      Desirable  Optimal    High     High     Very High     0-19 Y     0 - 109     ---    110-129   >/= 130     ----    20-24 Y     0 - 119     ---    120-159   >/= 160     ----      >24 Y     0 -  99   100-129  130-159   160-189     >/=190     09/19/2023 09:00  65 - 130 MG/DL Final   09/21/2022 02:13 PM 97 65 - 130 MG/DL Final   03/25/2021 04:24  65 - 130 MG/DL Final     VLDL   Date/Time Value Ref Range Status   02/19/2025 04:53 AM   Final     Comment:     Unable to calculate VLDL.   10/18/2024 10:40 AM 21 0 - 40 mg/dL Final      Last I/O:  I/O last 3 completed shifts:  In: 1070 (18 mL/kg) [P.O.:1070]  Out: - (0 mL/kg)   Weight: 59.5 kg     Past Cardiology Tests (Last 3 Years):  EKG:  Electrocardiogram 12 Lead 02/20/2025      ECG 12 Lead 02/20/2025      ECG 12 lead 02/20/2025      ECG 12 Lead       ECG 12 lead 02/18/2025      ECG 12 lead 02/18/2025      ECG 12 lead (Clinic Performed)     Echo:  Transthoracic Echo (TTE) Complete 02/18/2025    Ejection Fractions:  EF   Date/Time Value Ref Range Status   02/18/2025 03:41 PM 33 %      Cath:  Cardiac Catheterization Procedure 02/20/2025    Stress Test:  No results found for this or any previous visit from the past 1095 days.    Cardiac Imaging:  No results found for this or any previous visit from the past 1095 days.      Inpatient Medications:  Scheduled medications   Medication Dose Route Frequency    aspirin  81 mg oral Daily    cefuroxime  500 mg oral BID    empagliflozin  10 mg oral Daily    enoxaparin  40 mg subcutaneous Daily    insulin glargine  15 Units subcutaneous q24h    insulin lispro  0-5 Units subcutaneous TID AC    insulin lispro  4 Units subcutaneous TID AC    metoprolol succinate XL  25 mg oral Daily    PARoxetine  60 mg oral Daily    traZODone  200 mg oral Nightly     PRN medications    Medication    acetaminophen    benzonatate    dextromethorphan-guaifenesin    dextrose    dextrose    glucagon    glucagon    hydrOXYzine HCL    ipratropium-albuteroL    morphine    polyethylene glycol    prochlorperazine     Continuous Medications   Medication Dose Last Rate       Physical Exam:    General: Patient is in no acute distress.  HEENT: atraumatic normocephalic.  Neck: is supple jugular venous pressure within normal limits no thyromegaly.  Cardiovascular regular rate and rhythm normal heart sounds no murmurs rubs or gallops.  Lungs: clear to auscultation bilaterally.  Abdomen: is soft nontender.  Extremities warm to touch no edema.           Assessment/Plan  2/23:  #1. acute non-ST elevation myocardial infarction.  Echocardiogram showed apical and anterior wall severe hypokinesis.  Cardiac catheterization showed no significant coronary disease with LINDA-3 flow.  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  Troponin elevated.  She may have myocarditis.  No indication for aspirin or NSAIDs.  Occasions for steroids.  No significant arrhythmias.  We will monitor for now.  She is weaning off pressors.  Keep maps above 65 mmHg.  Patient renal function stable.  No indication for heparin drip.  Will monitor.  Patient is not in cardiogenic shock.  I will hold off beta-blockers for today since her blood pressure remains on the low side.  Blood pressure better with the midodrine.  Maybe start beta-blocker tomorrow patient usually has low blood pressure at baseline.      2. Influenza A infection management by primary team.     3.  Hypotension.  Patient has underlying history of blood pressure running on the low side.  I will start on midodrine 10 mg oral daily.  Wean her off Levophed if she is on any.  Her renal function stable.  Monitor renal function and urine output.    2/24: The patient was seen events reviewed in detail.  This patient does have a prior history of low normal  blood pressure readings type 2 diabetes hyperlipidemia.  She was admitted with influenza A with echocardiogram showing moderately severe LV impairment estimated ejection fraction of 30-35%.  EKG tracings with diffuse precordial and limb lead T wave inversion suggestive of Takotsubo cardiomyopathy.  The cardiac catheterization demonstrated angiographically normal coronary arteries.  Patient transferred from intensive care unit to stepdown unit placed on midodrine 10 mg 3 times daily which we will attempt to wean over the next 24 to 48 hours as long as systolic blood pressures are greater than 85 mmHg.  Following that we will attempt to resume very low-dose guideline directed medical therapy including low-dose beta-blockade and Entresto.  Patient had been on Jardiance prior to admission 25 mg daily will be restarted at 10 mg daily.  Will check serial EKG tracings.    2/25: The patient is resting comfortably no major complaints.  She does have a sister with a variety of hematologic abnormalities including prothrombin protein gene mutation along with TTP ITP and Sjogren syndrome.  The patient's EKG tracings continued to demonstrate signs of a Takotsubo cardiomyopathy with diffuse precordial and limb lead T wave inversions with QT prolongation.  Systolic blood pressures are in the 100 mmHg range are slightly lower.  Will discontinue midodrine and initiate very low-dose Toprol-XL 25 mg daily.  Will not utilize entresto at this time.  Anticipate that the patient will be prepared for discharge tomorrow and will arrange for follow-up and a repeat echocardiogram in 6 to 12 weeks to hopefully confirm return to normal LV ejection fraction.  Repeat CBC from today is unremarkable and the renal parameters are in normal range as well with a creatinine of 0.62.    2/26: Patient remains relatively asymptomatic.  Her systolic blood pressures are ranging between  mmHg with discontinuance of the midodrine and institution of very  low-dose Toprol-XL 25 mg daily.  Will not attempt to utilize entresto.  Her EKG shows persistent diffuse T wave inversion abnormalities with QT prolongation consistent with a Takotsubo syndrome.  Patient's renal function parameter is in normal range and the CBC is relatively unremarkable other than a hemoglobin of 11.3 hematocrit 35.7.  Will see patient in approximately 2 weeks for outpatient follow-up and then we will repeat echocardiogram in 5/2025.  Peripheral IV 02/19/25 20 G Left;Anterior Forearm (Active)   Site Assessment Clean;Dry;Intact 02/23/25 0400   Dressing Type Transparent 02/23/25 0400   Line Status Flushed;Saline locked 02/23/25 0400   Dressing Status Clean;Dry;Occlusive 02/23/25 0400   Number of days: 4       Peripheral IV 02/20/25 20 G Left;Proximal;Anterior;Upper Forearm (Active)   Site Assessment Clean;Dry;Intact 02/23/25 0400   Dressing Type Transparent 02/23/25 0400   Line Status Flushed;Saline locked 02/23/25 0400   Dressing Status Clean;Dry;Occlusive 02/23/25 0400   Number of days: 3       Code Status:  Full Code        Pradeep Tripp MD

## 2025-02-27 ENCOUNTER — APPOINTMENT (OUTPATIENT)
Dept: CARDIOLOGY | Facility: HOSPITAL | Age: 65
End: 2025-02-27
Payer: COMMERCIAL

## 2025-02-27 ENCOUNTER — PHARMACY VISIT (OUTPATIENT)
Dept: PHARMACY | Facility: CLINIC | Age: 65
End: 2025-02-27
Payer: COMMERCIAL

## 2025-02-27 VITALS
DIASTOLIC BLOOD PRESSURE: 54 MMHG | TEMPERATURE: 98.1 F | HEART RATE: 79 BPM | WEIGHT: 132.94 LBS | SYSTOLIC BLOOD PRESSURE: 91 MMHG | BODY MASS INDEX: 25.1 KG/M2 | HEIGHT: 61 IN | RESPIRATION RATE: 16 BRPM | OXYGEN SATURATION: 98 %

## 2025-02-27 PROBLEM — I21.4 NSTEMI (NON-ST ELEVATED MYOCARDIAL INFARCTION) (MULTI): Status: RESOLVED | Noted: 2025-02-18 | Resolved: 2025-02-27

## 2025-02-27 PROBLEM — J10.1 INFLUENZA A: Status: RESOLVED | Noted: 2025-02-18 | Resolved: 2025-02-27

## 2025-02-27 PROBLEM — J96.01 ACUTE RESPIRATORY FAILURE WITH HYPOXIA (MULTI): Status: RESOLVED | Noted: 2025-02-19 | Resolved: 2025-02-27

## 2025-02-27 LAB
ALBUMIN SERPL BCP-MCNC: 3.2 G/DL (ref 3.4–5)
ANION GAP SERPL CALCULATED.3IONS-SCNC: 11 MMOL/L (ref 10–20)
ATRIAL RATE: 70 BPM
BASOPHILS # BLD AUTO: 0.06 X10*3/UL (ref 0–0.1)
BASOPHILS NFR BLD AUTO: 0.9 %
BUN SERPL-MCNC: 13 MG/DL (ref 6–23)
CALCIUM SERPL-MCNC: 8.5 MG/DL (ref 8.6–10.3)
CHLORIDE SERPL-SCNC: 103 MMOL/L (ref 98–107)
CO2 SERPL-SCNC: 31 MMOL/L (ref 21–32)
CREAT SERPL-MCNC: 0.73 MG/DL (ref 0.5–1.05)
EGFRCR SERPLBLD CKD-EPI 2021: >90 ML/MIN/1.73M*2
EOSINOPHIL # BLD AUTO: 0.11 X10*3/UL (ref 0–0.7)
EOSINOPHIL NFR BLD AUTO: 1.6 %
ERYTHROCYTE [DISTWIDTH] IN BLOOD BY AUTOMATED COUNT: 12.9 % (ref 11.5–14.5)
GLUCOSE BLD MANUAL STRIP-MCNC: 148 MG/DL (ref 74–99)
GLUCOSE BLD MANUAL STRIP-MCNC: 171 MG/DL (ref 74–99)
GLUCOSE SERPL-MCNC: 91 MG/DL (ref 74–99)
HCT VFR BLD AUTO: 39 % (ref 36–46)
HGB BLD-MCNC: 12.7 G/DL (ref 12–16)
IMM GRANULOCYTES # BLD AUTO: 0.03 X10*3/UL (ref 0–0.7)
IMM GRANULOCYTES NFR BLD AUTO: 0.4 % (ref 0–0.9)
LYMPHOCYTES # BLD AUTO: 3.54 X10*3/UL (ref 1.2–4.8)
LYMPHOCYTES NFR BLD AUTO: 50.8 %
MAGNESIUM SERPL-MCNC: 1.95 MG/DL (ref 1.6–2.4)
MCH RBC QN AUTO: 28 PG (ref 26–34)
MCHC RBC AUTO-ENTMCNC: 32.6 G/DL (ref 32–36)
MCV RBC AUTO: 86 FL (ref 80–100)
MONOCYTES # BLD AUTO: 0.91 X10*3/UL (ref 0.1–1)
MONOCYTES NFR BLD AUTO: 13.1 %
NEUTROPHILS # BLD AUTO: 2.32 X10*3/UL (ref 1.2–7.7)
NEUTROPHILS NFR BLD AUTO: 33.2 %
NRBC BLD-RTO: 0 /100 WBCS (ref 0–0)
P AXIS: 60 DEGREES
P OFFSET: 204 MS
P ONSET: 146 MS
PHOSPHATE SERPL-MCNC: 4.8 MG/DL (ref 2.5–4.9)
PLATELET # BLD AUTO: 458 X10*3/UL (ref 150–450)
POTASSIUM SERPL-SCNC: 3.9 MMOL/L (ref 3.5–5.3)
PR INTERVAL: 150 MS
Q ONSET: 221 MS
QRS COUNT: 12 BEATS
QRS DURATION: 94 MS
QT INTERVAL: 500 MS
QTC CALCULATION(BAZETT): 540 MS
QTC FREDERICIA: 526 MS
R AXIS: 1 DEGREES
RBC # BLD AUTO: 4.54 X10*6/UL (ref 4–5.2)
SODIUM SERPL-SCNC: 141 MMOL/L (ref 136–145)
T AXIS: 164 DEGREES
T OFFSET: 471 MS
VENTRICULAR RATE: 70 BPM
WBC # BLD AUTO: 7 X10*3/UL (ref 4.4–11.3)

## 2025-02-27 PROCEDURE — 93010 ELECTROCARDIOGRAM REPORT: CPT | Performed by: INTERNAL MEDICINE

## 2025-02-27 PROCEDURE — 82947 ASSAY GLUCOSE BLOOD QUANT: CPT

## 2025-02-27 PROCEDURE — 2500000004 HC RX 250 GENERAL PHARMACY W/ HCPCS (ALT 636 FOR OP/ED)

## 2025-02-27 PROCEDURE — 2500000002 HC RX 250 W HCPCS SELF ADMINISTERED DRUGS (ALT 637 FOR MEDICARE OP, ALT 636 FOR OP/ED)

## 2025-02-27 PROCEDURE — 2500000001 HC RX 250 WO HCPCS SELF ADMINISTERED DRUGS (ALT 637 FOR MEDICARE OP)

## 2025-02-27 PROCEDURE — 2500000001 HC RX 250 WO HCPCS SELF ADMINISTERED DRUGS (ALT 637 FOR MEDICARE OP): Performed by: INTERNAL MEDICINE

## 2025-02-27 PROCEDURE — 83735 ASSAY OF MAGNESIUM: CPT

## 2025-02-27 PROCEDURE — 99232 SBSQ HOSP IP/OBS MODERATE 35: CPT | Performed by: INTERNAL MEDICINE

## 2025-02-27 PROCEDURE — 80069 RENAL FUNCTION PANEL: CPT

## 2025-02-27 PROCEDURE — 36415 COLL VENOUS BLD VENIPUNCTURE: CPT

## 2025-02-27 PROCEDURE — 2500000001 HC RX 250 WO HCPCS SELF ADMINISTERED DRUGS (ALT 637 FOR MEDICARE OP): Performed by: REGISTERED NURSE

## 2025-02-27 PROCEDURE — 85025 COMPLETE CBC W/AUTO DIFF WBC: CPT

## 2025-02-27 PROCEDURE — RXMED WILLOW AMBULATORY MEDICATION CHARGE

## 2025-02-27 PROCEDURE — 2500000002 HC RX 250 W HCPCS SELF ADMINISTERED DRUGS (ALT 637 FOR MEDICARE OP, ALT 636 FOR OP/ED): Performed by: INTERNAL MEDICINE

## 2025-02-27 PROCEDURE — 99239 HOSP IP/OBS DSCHRG MGMT >30: CPT | Performed by: INTERNAL MEDICINE

## 2025-02-27 PROCEDURE — 93005 ELECTROCARDIOGRAM TRACING: CPT

## 2025-02-27 RX ORDER — INSULIN LISPRO 100 [IU]/ML
INJECTION, SOLUTION INTRAVENOUS; SUBCUTANEOUS
Qty: 15 ML | Refills: 6 | OUTPATIENT
Start: 2025-02-27

## 2025-02-27 RX ORDER — METOPROLOL SUCCINATE 25 MG/1
25 TABLET, EXTENDED RELEASE ORAL DAILY
Qty: 30 TABLET | Refills: 3 | Status: SHIPPED | OUTPATIENT
Start: 2025-02-28

## 2025-02-27 RX ORDER — INSULIN GLARGINE-YFGN 100 [IU]/ML
INJECTION, SOLUTION SUBCUTANEOUS
Qty: 15 ML | Refills: 6 | OUTPATIENT
Start: 2025-02-27

## 2025-02-27 RX ORDER — CEFUROXIME AXETIL 500 MG/1
500 TABLET ORAL 2 TIMES DAILY
Qty: 10 TABLET | Refills: 0 | Status: SHIPPED | OUTPATIENT
Start: 2025-02-27

## 2025-02-27 RX ORDER — NAPROXEN SODIUM 220 MG/1
81 TABLET, FILM COATED ORAL DAILY
Start: 2025-02-28

## 2025-02-27 RX ORDER — PEN NEEDLE, DIABETIC 30 GX3/16"
NEEDLE, DISPOSABLE MISCELLANEOUS
Qty: 200 EACH | Refills: 6 | OUTPATIENT
Start: 2025-02-27

## 2025-02-27 RX ADMIN — ENOXAPARIN SODIUM 40 MG: 40 INJECTION SUBCUTANEOUS at 08:48

## 2025-02-27 RX ADMIN — EMPAGLIFLOZIN 10 MG: 10 TABLET, FILM COATED ORAL at 08:47

## 2025-02-27 RX ADMIN — CEFUROXIME AXETIL 500 MG: 500 TABLET ORAL at 08:47

## 2025-02-27 RX ADMIN — BENZOCAINE AND MENTHOL 1 LOZENGE: 15; 3.6 LOZENGE ORAL at 08:47

## 2025-02-27 RX ADMIN — POLYETHYLENE GLYCOL 3350 17 G: 17 POWDER, FOR SOLUTION ORAL at 11:27

## 2025-02-27 RX ADMIN — INSULIN LISPRO 4 UNITS: 100 INJECTION, SOLUTION INTRAVENOUS; SUBCUTANEOUS at 08:51

## 2025-02-27 RX ADMIN — BENZONATATE 100 MG: 100 CAPSULE ORAL at 08:47

## 2025-02-27 RX ADMIN — INSULIN GLARGINE 15 UNITS: 100 INJECTION, SOLUTION SUBCUTANEOUS at 08:51

## 2025-02-27 RX ADMIN — INSULIN LISPRO 4 UNITS: 100 INJECTION, SOLUTION INTRAVENOUS; SUBCUTANEOUS at 11:27

## 2025-02-27 RX ADMIN — INSULIN LISPRO 1 UNITS: 100 INJECTION, SOLUTION INTRAVENOUS; SUBCUTANEOUS at 08:50

## 2025-02-27 RX ADMIN — ASPIRIN 81 MG CHEWABLE TABLET 81 MG: 81 TABLET CHEWABLE at 08:47

## 2025-02-27 RX ADMIN — PAROXETINE HYDROCHLORIDE 60 MG: 20 TABLET, FILM COATED ORAL at 08:47

## 2025-02-27 ASSESSMENT — COGNITIVE AND FUNCTIONAL STATUS - GENERAL
MOVING TO AND FROM BED TO CHAIR: A LITTLE
DRESSING REGULAR LOWER BODY CLOTHING: A LITTLE
DRESSING REGULAR UPPER BODY CLOTHING: A LITTLE
TOILETING: A LITTLE
WALKING IN HOSPITAL ROOM: A LITTLE
CLIMB 3 TO 5 STEPS WITH RAILING: A LITTLE
HELP NEEDED FOR BATHING: A LITTLE
PERSONAL GROOMING: A LITTLE
MOBILITY SCORE: 19
EATING MEALS: A LITTLE
STANDING UP FROM CHAIR USING ARMS: A LITTLE
TURNING FROM BACK TO SIDE WHILE IN FLAT BAD: A LITTLE
DAILY ACTIVITIY SCORE: 18

## 2025-02-27 ASSESSMENT — PAIN SCALES - GENERAL
PAINLEVEL_OUTOF10: 0 - NO PAIN
PAINLEVEL_OUTOF10: 0 - NO PAIN

## 2025-02-27 ASSESSMENT — PAIN - FUNCTIONAL ASSESSMENT
PAIN_FUNCTIONAL_ASSESSMENT: 0-10
PAIN_FUNCTIONAL_ASSESSMENT: 0-10

## 2025-02-27 NOTE — DISCHARGE SUMMARY
Discharge Diagnosis  Influenza A    Issues Requiring Follow-Up  Diabetes on insulin.  Cardiomyopathy.    Discharge Meds     Medication List      START taking these medications     aspirin 81 mg chewable tablet; Chew 1 tablet (81 mg) once daily.; Start   taking on: February 28, 2025   cefuroxime 500 mg tablet; Commonly known as: Ceftin; Take 1 tablet (500   mg) by mouth 2 times a day.   metoprolol succinate XL 25 mg 24 hr tablet; Commonly known as:   Toprol-XL; Take 1 tablet (25 mg) by mouth once daily. Do not crush or   chew.; Start taking on: February 28, 2025     CHANGE how you take these medications     empagliflozin 10 mg; Commonly known as: Jardiance; Take 1 tablet (10 mg)   by mouth once daily.; Start taking on: February 28, 2025; What changed:   medication strength, how much to take     CONTINUE taking these medications     blood-glu meter,cont-transmit misc   cholecalciferol 25 MCG (1000 UT) capsule; Commonly known as: Vitamin D-3   cyanocobalamin 1,000 mcg tablet; Commonly known as: Vitamin B-12   Dexcom G7 Sensor device; Generic drug: blood-glucose sensor; Change   every 10 DAYS   hydrOXYzine HCL 25 mg tablet; Commonly known as: Atarax; TAKE TWO   TABLETS BY MOUTH EVERY 8 HOURS FOR ANXIETY   multivitamin tablet   PARoxetine 40 mg tablet; Commonly known as: Paxil; Take 1.5 tablets (60   mg) by mouth once daily.   pravastatin 20 mg tablet; Commonly known as: Pravachol; Take 1 tablet   (20 mg) by mouth once daily.   traZODone 100 mg tablet; Commonly known as: Desyrel; TAKE TWO TABLETS BY   MOUTH EVERY DAY   Trulicity 4.5 mg/0.5 mL pen injector; Generic drug: dulaglutide; INJECT   4.5MG UNDER THE SKIN ONCE WEEKLY     STOP taking these medications     benzonatate 200 mg capsule; Commonly known as: Tessalon   CORICIDIN HBP COUGH AND COLD ORAL   guaiFENesin 1,200 mg tablet extended release 12hr; Commonly known as:   Mucinex   MILK THISTLE ORAL   TURMERIC ORAL   zoster vaccine-recombinant adjuvanted 50 mcg/0.5 mL  vaccine; Commonly   known as: Shingrix       Test Results Pending At Discharge  Pending Labs       No current pending labs.            Hospital Course   Patient came in for with influenza and acute respiratory failure.  She spent some time in the ICU.  She had an NSTEMI and for a brief period of time was hypotensive.  She ended up having an echocardiogram that showed EF of 30%.  Cardiac cath showed clean coronaries.  Her respiratory failure and her hemodynamics were stabilized.  After she came out of the unit she was put on a appropriate cardiac regimen given her cardiomyopathy and her low blood pressure.  Regarding her diabetes she has been on Trulicity and Jardiance.  On this regimen her blood sugars were quite high with a hemoglobin A1c of 11.2.  I have told the patient that she must be on insulin and I have put her on a multiple daily injection insulin regimen consisting of Lantus and short acting Humalog.  Have titrated this in the last 3 days.  She is now being discharged to home with a slightly revised cardiac regimen and plans for daily Lantus and short acting insulin with meals.  She is to follow-up with her primary care doctor Dr. Cheney and Dr. Tripp cardiology.  I spent 35 minutes arranging coordinating clarifying and documenting this discharge    Pertinent Physical Exam At Time of Discharge  Physical Exam    Outpatient Follow-Up  Future Appointments   Date Time Provider Department Center   2/28/2025  1:00 PM Sheila Cheney MD [id number] [facility]   3/6/2025  3:30 PM Sheila Cheney MD [id number] [facility]   7/15/2025  3:00 PM Alberto Betancur MD [id number] [facility]         Thong Crews MD

## 2025-02-27 NOTE — CARE PLAN
The patient's goals for the shift include discharge planning    The clinical goals for the shift include discharge planning

## 2025-02-27 NOTE — NURSING NOTE
Assumed care of patient. Patient pleasant and cooperative during BSSR. Patient hopeful for discharge later today. Call light within reach. Bed alarm in use.

## 2025-02-27 NOTE — PROGRESS NOTES
Pt anticipated to dc home today.      02/27/25 1018   Discharge Planning   Expected Discharge Disposition Home

## 2025-02-27 NOTE — NURSING NOTE
Patient discharged home. Discharge paperwork reviewed by Shannan Izaguirre RN. All questions answered. IV removed catheter tip intact. Patient packed all belongings and left via wheelchair.

## 2025-02-27 NOTE — NURSING NOTE
Assumed care of this pt. Pt is lying in bed, breathing even and unlabored on RA. NAD. BSSR completed. Bed low, call light and belongings in reach. Continuing to monitor

## 2025-02-27 NOTE — DISCHARGE INSTRUCTIONS
See the handwritten sliding scale insulin regimen that I have written out for insulin before every meal

## 2025-02-27 NOTE — CONSULTS
"Nutrition Assessement Note    Nutrition Assessment    Reason for Assessment: Length of stay    Reason for Hospital Admission:  Desiree Shepard is a 64 y.o. female who is admitted for influenza A. Pt presenting with congestion and chest tightness. Onset of symptoms was about 4-5 days ago. Pt BG ranging between  mg/dl during admission. Pt is now insulin dependent. Discussed carbohydrate controlled diet and provided handouts.     Malnutrition Screening Tool (MST)  Have you recently lost weight without trying?: No  Weight Loss Score: 0  Have you been eating poorly because of a decreased appetite?: No  Malnutrition Score: 0  Nutrition Screen  Stage 3 or 4 Pressure Injury or Multiple Non-Healing Wounds: No  Home Tube Feeding or Total Parenteral Nutrition (TPN): No  Dietitian Consult Needed: No    Past Medical History:   Diagnosis Date    Acid reflux     Age-related nuclear cataract of both eyes 6/13/2024    Diabetes mellitus (Multi)     Herpes     Hiatal hernia     High cholesterol     History of HPV infection     IBS (irritable bowel syndrome)     Insomnia     Rosacea     Unspecified disorder of refraction 6/13/2024      Past Surgical History:   Procedure Laterality Date    APPENDECTOMY  2005    CARDIAC CATHETERIZATION N/A 2/20/2025    Procedure: Left Heart Cath;  Surgeon: Navin Hughes DO;  Location[facility] Cardiac [facility];  Service: Cardiovascular;  Laterality: N/A;    COLONOSCOPY  2015    TORTUOUS COLON- REPEAT IN 5 YEARS--DR. HERNANDES    HERNIA REPAIR  01/2018    TUBAL LIGATION Bilateral 11/1991       Nutrition History:  Food and Nutrient History: Pt reported that her appetite has been improving. Pt reported that her appetite was low for 3 months and that she was eating 50% of her usual PO intake.  Energy Intake: Good > 75 %     Food Allergies/Intolerances:  None  GI Symptoms: Constipation  Oral Problems: None    Anthropometrics:  Ht: 154.9 cm (5' 1\"), Wt: 60.3 kg (132 lb 15 oz), BMI: 25.13             Weight " Change:  Daily Weight  02/27/25 : 60.3 kg (132 lb 15 oz)  02/18/25 : 54.4 kg (120 lb)  12/05/24 : 60.8 kg (134 lb)  09/05/24 : 56.2 kg (124 lb)  06/06/24 : 57.6 kg (127 lb)  05/01/24 : 58.7 kg (129 lb 6.4 oz)  03/06/24 : 56.2 kg (124 lb)  03/04/24 : 56.2 kg (124 lb)  12/04/23 : 59.9 kg (132 lb)  09/14/23 : 62.6 kg (138 lb)    Weight History / % Weight Change: Pt reported 20# weight loss over 2 years. Reported UBW to be 140#             Nutrition Focused Physical Exam Findings:                       Nutrition Significant Labs:  Lab Results   Component Value Date    WBC 7.0 02/27/2025    HGB 12.7 02/27/2025    HCT 39.0 02/27/2025     (H) 02/27/2025    CHOL 179 02/19/2025    TRIG 417 (H) 02/19/2025    HDL 43.6 02/19/2025    ALT 22 02/19/2025    AST 34 02/19/2025     02/27/2025    K 3.9 02/27/2025     02/27/2025    CREATININE 0.73 02/27/2025    BUN 13 02/27/2025    CO2 31 02/27/2025    TSH 1.38 02/18/2025    HGBA1C 11.2 (H) 02/18/2025    ALBUR 12 09/19/2023     Nutrition Specific Medications:  aspirin, 81 mg, oral, Daily  cefuroxime, 500 mg, oral, BID  empagliflozin, 10 mg, oral, Daily  enoxaparin, 40 mg, subcutaneous, Daily  insulin glargine, 15 Units, subcutaneous, q24h  insulin lispro, 0-5 Units, subcutaneous, TID AC  insulin lispro, 4 Units, subcutaneous, TID AC  metoprolol succinate XL, 25 mg, oral, Daily  PARoxetine, 60 mg, oral, Daily  traZODone, 200 mg, oral, Nightly      Dietary Orders (From admission, onward)       Start     Ordered    02/21/25 0602  Adult diet Cardiac, Consistent Carb; CCD 75 gm/meal; 70 gm fat; 2 - 3 grams Sodium  Diet effective now        Question Answer Comment   Diet type Cardiac    Diet type Consistent Carb    Carb diet selection: CCD 75 gm/meal    Fat restriction: 70 gm fat    Sodium restriction: 2 - 3 grams Sodium        02/21/25 0601    02/20/25 5179  May Participate in Room Service  ( ROOM SERVICE MAY PARTICIPATE)  Once        Question:  .  Answer:  Yes     02/20/25 1358                  Estimated Needs:   Estimated Energy Needs  Total Energy Estimated Needs in 24 hours (kCal): 1508 kCal  Energy Estimated Needs per kg Body Weight in 24 hours (kCal/kg): 25 kCal/kg  Method for Estimating Needs: actual wt    Estimated Protein Needs  Total Protein Estimated Needs in 24 Hours (g): 60 g  Protein Estimated Needs per kg Body Weight in 24 Hours (g/kg): 1 g/kg  Method for Estimating 24 Hour Protein Needs: actual wt    Estimated Fluid Needs  Total Fluid Estimated Needs in 24 Hours (mL): 1508 mL  Method for Estimating 24 Hour Fluid Needs: 1 mL/kcal        Nutrition Diagnosis   Nutrition Diagnosis:       Nutrition Diagnosis  Patient has Nutrition Diagnosis: Yes  Diagnosis Status (1): New  Nutrition Diagnosis 1: Food and nutrition related knowledge deficit  Related to (1): needs review of diet education  As Evidenced by (1): eleavted a1c of 11.2%       Nutrition Interventions/Recommendations   Nutrition Interventions and Recommendations:  Nutrition Prescription: Nutrition prescription for oral nutrition    Nutrition Recommendations:  Individualized Nutrition Prescription Provided for : 1508 kcals, 60 g protein    Nutrition Interventions/Goals:   Food and/or Nutrient Delivery Interventions  Interventions: Meals and snacks  Meals and Snacks: Fat-modified diet, Carbohydrate-modified diet, Mineral-modified diet  Goal: provide diet as ordered    Education Documentation  Healthy Food Options, taught by Vidya Pearson RDN, LD at 2/27/2025 11:45 AM.  Learner: Patient  Readiness: Acceptance  Method: Explanation, Handout  Response: Verbalizes Understanding    How Foods Affect Blood Sugar, taught by Vidya Pearson RDN, LD at 2/27/2025 11:45 AM.  Learner: Patient  Readiness: Acceptance  Method: Explanation, Handout  Response: Verbalizes Understanding             Nutrition Monitoring and Evaluation   Monitoring/Evaluation:   Food/Nutrient Related History Monitoring  Monitoring and Evaluation Plan:  Estimated Energy Intake  Estimated Energy Intake: Energy intake greater or equal to 75% of estimated energy needs         Biochemical Data, Medical Tests and Procedures  Monitoring and Evaluation Plan: Glucose/endocrine profile  Glucose/Endocrine Profile: Glucose within normal limits ( mg/dL), Hemoglobin A1c (HgbA1c)  Criteria: labs will trend towards desirable range              Goal Status: New goal(s) identified    Follow Up  Time Spent (min): 30 minutes  Last Date of Nutrition Visit: 02/27/25  Nutrition Follow-Up Needed?: 5-7 days  Follow up Comment: 3/5/25

## 2025-02-27 NOTE — PROGRESS NOTES
Subjective Data:  Patient is doing good.  Nuys any chest pain.  No shortness of breath.    Overnight Events:    Telemetry overnight reviewed no events     Objective Data:  Last Recorded Vitals:  Vitals:    02/27/25 0354 02/27/25 0420 02/27/25 0753 02/27/25 1121   BP:  (!) 83/41 (!) 90/45 91/54   BP Location:   Left arm Left arm   Patient Position:   Lying Lying   Pulse: 63   79   Resp: 16  16 16   Temp: 36.8 °C (98.2 °F)  36.6 °C (97.9 °F) 36.7 °C (98.1 °F)   TempSrc: Oral  Oral Oral   SpO2: 97%  94% 98%   Weight: 60.3 kg (132 lb 15 oz)      Height:           Last Labs:  CBC - 2/27/2025:  5:43 AM  7.0 12.7 458    39.0      CMP - 2/27/2025:  5:43 AM  8.5 6.3 34 --- 0.4   4.8 3.2 22 87      PTT - 2/20/2025:  4:47 AM  _   _ 44.0     TROPHS   Date/Time Value Ref Range Status   02/22/2025 04:58  0 - 13 ng/L Final   02/20/2025 07:28 AM 1,788 0 - 13 ng/L Final   02/18/2025 11:14  0 - 13 ng/L Final     Comment:     Previous result verified on 2/18/2025 1050 on specimen/case [id number] called with component [facility] for procedure Troponin I, High Sensitivity, Initial with value 382 ng/L.     BNP   Date/Time Value Ref Range Status   02/18/2025 12:35 PM 90 0 - 99 pg/mL Final     HGBA1C   Date/Time Value Ref Range Status   02/18/2025 12:35 PM 11.2 See comment % Final   12/05/2024 04:01 PM 7.2 4.2 - 6.5 % Final   09/05/2024 04:13 PM 7.4 4.2 - 6.5 % Final     LDLCALC   Date/Time Value Ref Range Status   02/19/2025 04:53 AM   Final     Comment:     The calculation of LDL and VLDL are inaccurate when the Triglycerides are greater than 400 mg/dL or when the patient is non-fasting. If LDL measurement is necessary contact the testing laboratory for an alternative LDL assay.                                  Near   Borderline      AGE      Desirable  Optimal    High     High     Very High     0-19 Y     0 - 109     ---    110-129   >/= 130     ----    20-24 Y     0 - 119     ---    120-159   >/= 160     ----      >24 Y      0 -  99   100-129  130-159   160-189     >/=190     10/18/2024 10:40  <=99 mg/dL Final     Comment:                                 Near   Borderline      AGE      Desirable  Optimal    High     High     Very High     0-19 Y     0 - 109     ---    110-129   >/= 130     ----    20-24 Y     0 - 119     ---    120-159   >/= 160     ----      >24 Y     0 -  99   100-129  130-159   160-189     >/=190     09/19/2023 09:00  65 - 130 MG/DL Final   09/21/2022 02:13 PM 97 65 - 130 MG/DL Final   03/25/2021 04:24  65 - 130 MG/DL Final     VLDL   Date/Time Value Ref Range Status   02/19/2025 04:53 AM   Final     Comment:     Unable to calculate VLDL.   10/18/2024 10:40 AM 21 0 - 40 mg/dL Final      Last I/O:  I/O last 3 completed shifts:  In: 470 (7.8 mL/kg) [P.O.:470]  Out: - (0 mL/kg)   Weight: 60.3 kg     Past Cardiology Tests (Last 3 Years):  EKG:  Electrocardiogram 12 Lead 02/20/2025      ECG 12 Lead 02/20/2025      ECG 12 lead 02/20/2025      ECG 12 Lead       ECG 12 lead 02/18/2025      ECG 12 lead 02/18/2025      ECG 12 lead (Clinic Performed)     Echo:  Transthoracic Echo (TTE) Complete 02/18/2025    Ejection Fractions:  EF   Date/Time Value Ref Range Status   02/18/2025 03:41 PM 33 %      Cath:  Cardiac Catheterization Procedure 02/20/2025    Stress Test:  No results found for this or any previous visit from the past 1095 days.    Cardiac Imaging:  No results found for this or any previous visit from the past 1095 days.      Inpatient Medications:  Scheduled medications   Medication Dose Route Frequency    aspirin  81 mg oral Daily    cefuroxime  500 mg oral BID    empagliflozin  10 mg oral Daily    enoxaparin  40 mg subcutaneous Daily    insulin glargine  15 Units subcutaneous q24h    insulin lispro  0-5 Units subcutaneous TID AC    insulin lispro  4 Units subcutaneous TID AC    metoprolol succinate XL  25 mg oral Daily    PARoxetine  60 mg oral Daily    traZODone  200 mg oral Nightly     PRN  medications   Medication    acetaminophen    benzocaine-menthol    benzonatate    dextromethorphan-guaifenesin    dextrose    dextrose    glucagon    glucagon    hydrOXYzine HCL    ipratropium-albuteroL    morphine    polyethylene glycol    prochlorperazine     Continuous Medications   Medication Dose Last Rate       Physical Exam:    General: Patient is in no acute distress.  HEENT: atraumatic normocephalic.  Neck: is supple jugular venous pressure within normal limits no thyromegaly.  Cardiovascular regular rate and rhythm normal heart sounds no murmurs rubs or gallops.  Lungs: clear to auscultation bilaterally.  Abdomen: is soft nontender.  Extremities warm to touch no edema.           Assessment/Plan  2/23:  #1. acute non-ST elevation myocardial infarction.  Echocardiogram showed apical and anterior wall severe hypokinesis.  Cardiac catheterization showed no significant coronary disease with LINDA-3 flow.  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  Troponin elevated.  She may have myocarditis.  No indication for aspirin or NSAIDs.  Occasions for steroids.  No significant arrhythmias.  We will monitor for now.  She is weaning off pressors.  Keep maps above 65 mmHg.  Patient renal function stable.  No indication for heparin drip.  Will monitor.  Patient is not in cardiogenic shock.  I will hold off beta-blockers for today since her blood pressure remains on the low side.  Blood pressure better with the midodrine.  Maybe start beta-blocker tomorrow patient usually has low blood pressure at baseline.      2. Influenza A infection management by primary team.     3.  Hypotension.  Patient has underlying history of blood pressure running on the low side.  I will start on midodrine 10 mg oral daily.  Wean her off Levophed if she is on any.  Her renal function stable.  Monitor renal function and urine output.    2/24: The patient was seen events reviewed in detail.  This patient does  have a prior history of low normal blood pressure readings type 2 diabetes hyperlipidemia.  She was admitted with influenza A with echocardiogram showing moderately severe LV impairment estimated ejection fraction of 30-35%.  EKG tracings with diffuse precordial and limb lead T wave inversion suggestive of Takotsubo cardiomyopathy.  The cardiac catheterization demonstrated angiographically normal coronary arteries.  Patient transferred from intensive care unit to stepdown unit placed on midodrine 10 mg 3 times daily which we will attempt to wean over the next 24 to 48 hours as long as systolic blood pressures are greater than 85 mmHg.  Following that we will attempt to resume very low-dose guideline directed medical therapy including low-dose beta-blockade and Entresto.  Patient had been on Jardiance prior to admission 25 mg daily will be restarted at 10 mg daily.  Will check serial EKG tracings.    2/25: The patient is resting comfortably no major complaints.  She does have a sister with a variety of hematologic abnormalities including prothrombin protein gene mutation along with TTP ITP and Sjogren syndrome.  The patient's EKG tracings continued to demonstrate signs of a Takotsubo cardiomyopathy with diffuse precordial and limb lead T wave inversions with QT prolongation.  Systolic blood pressures are in the 100 mmHg range are slightly lower.  Will discontinue midodrine and initiate very low-dose Toprol-XL 25 mg daily.  Will not utilize entresto at this time.  Anticipate that the patient will be prepared for discharge tomorrow and will arrange for follow-up and a repeat echocardiogram in 6 to 12 weeks to hopefully confirm return to normal LV ejection fraction.  Repeat CBC from today is unremarkable and the renal parameters are in normal range as well with a creatinine of 0.62.    2/26: Patient remains relatively asymptomatic.  Her systolic blood pressures are ranging between  mmHg with discontinuance of the  midodrine and institution of very low-dose Toprol-XL 25 mg daily.  Will not attempt to utilize entresto.  Her EKG shows persistent diffuse T wave inversion abnormalities with QT prolongation consistent with a Takotsubo syndrome.  Patient's renal function parameter is in normal range and the CBC is relatively unremarkable other than a hemoglobin of 11.3 hematocrit 35.7.  Will see patient in approximately 2 weeks for outpatient follow-up and then we will repeat echocardiogram in 5/2025.    2/27: The patient remains essentially asymptomatic denies lightheadedness.  Her systolic blood pressures have been in the 90 mmHg range which is her baseline.  She has been started on the low-dose Toprol-XL 25 mg daily.  The patient had been on Jardiance 10 mg daily prior to admission and this will be maintained.  Patient stable for discharge from the cardiac standpoint at this time.  She will be seen in approximately 2 weeks for follow-up at which time she will repeat EKG.  Will have a repeat echocardiogram performed in approximately 3 months in 5/2025 to hopefully confirm further resolution of the Takotsubo cardiomyopathy.  Peripheral IV 02/19/25 20 G Left;Anterior Forearm (Active)   Site Assessment Clean;Dry;Intact 02/23/25 0400   Dressing Type Transparent 02/23/25 0400   Line Status Flushed;Saline locked 02/23/25 0400   Dressing Status Clean;Dry;Occlusive 02/23/25 0400   Number of days: 4       Peripheral IV 02/20/25 20 G Left;Proximal;Anterior;Upper Forearm (Active)   Site Assessment Clean;Dry;Intact 02/23/25 0400   Dressing Type Transparent 02/23/25 0400   Line Status Flushed;Saline locked 02/23/25 0400   Dressing Status Clean;Dry;Occlusive 02/23/25 0400   Number of days: 3       Code Status:  Full Code        Pradeep Tripp MD

## 2025-02-28 ENCOUNTER — PATIENT OUTREACH (OUTPATIENT)
Dept: PRIMARY CARE | Facility: CLINIC | Age: 65
End: 2025-02-28
Payer: COMMERCIAL

## 2025-02-28 ENCOUNTER — OFFICE VISIT (OUTPATIENT)
Dept: PRIMARY CARE | Facility: CLINIC | Age: 65
End: 2025-02-28
Payer: COMMERCIAL

## 2025-02-28 VITALS
SYSTOLIC BLOOD PRESSURE: 100 MMHG | BODY MASS INDEX: 22.47 KG/M2 | RESPIRATION RATE: 16 BRPM | OXYGEN SATURATION: 99 % | DIASTOLIC BLOOD PRESSURE: 60 MMHG | TEMPERATURE: 98.5 F | HEIGHT: 61 IN | WEIGHT: 119 LBS | HEART RATE: 72 BPM

## 2025-02-28 DIAGNOSIS — J11.1 INFLUENZA: ICD-10-CM

## 2025-02-28 DIAGNOSIS — I21.4 NSTEMI (NON-ST ELEVATED MYOCARDIAL INFARCTION) (MULTI): ICD-10-CM

## 2025-02-28 DIAGNOSIS — E11.9 DIABETES MELLITUS WITHOUT COMPLICATION (MULTI): Primary | ICD-10-CM

## 2025-02-28 DIAGNOSIS — E78.2 MIXED HYPERLIPIDEMIA: ICD-10-CM

## 2025-02-28 PROCEDURE — 3074F SYST BP LT 130 MM HG: CPT | Performed by: INTERNAL MEDICINE

## 2025-02-28 PROCEDURE — 3008F BODY MASS INDEX DOCD: CPT | Performed by: INTERNAL MEDICINE

## 2025-02-28 PROCEDURE — 3046F HEMOGLOBIN A1C LEVEL >9.0%: CPT | Performed by: INTERNAL MEDICINE

## 2025-02-28 PROCEDURE — 3078F DIAST BP <80 MM HG: CPT | Performed by: INTERNAL MEDICINE

## 2025-02-28 PROCEDURE — 99495 TRANSJ CARE MGMT MOD F2F 14D: CPT | Performed by: INTERNAL MEDICINE

## 2025-02-28 RX ORDER — BLOOD-GLUCOSE SENSOR
EACH MISCELLANEOUS
Qty: 3 EACH | Refills: 11 | Status: SHIPPED | OUTPATIENT
Start: 2025-02-28

## 2025-02-28 ASSESSMENT — ENCOUNTER SYMPTOMS
NAUSEA: 0
DIZZINESS: 0
ARTHRALGIAS: 0
OCCASIONAL FEELINGS OF UNSTEADINESS: 0
PALPITATIONS: 0
LOSS OF SENSATION IN FEET: 0
CONSTIPATION: 0
ABDOMINAL PAIN: 0
DIARRHEA: 0
DEPRESSION: 0

## 2025-02-28 ASSESSMENT — PATIENT HEALTH QUESTIONNAIRE - PHQ9
2. FEELING DOWN, DEPRESSED OR HOPELESS: NOT AT ALL
1. LITTLE INTEREST OR PLEASURE IN DOING THINGS: NOT AT ALL
SUM OF ALL RESPONSES TO PHQ9 QUESTIONS 1 AND 2: 0

## 2025-02-28 ASSESSMENT — PAIN SCALES - GENERAL: PAINLEVEL_OUTOF10: 3

## 2025-02-28 NOTE — PROGRESS NOTES
Discharge Facility: [facility]  Discharge Diagnosis: Influenza A   Admission Date: 02/19/2025  Discharge Date: 02/27/2025    PCP Appointment Date: 02/28/2025  Specialist Appointment Date: Cardio 03/14/2025, Ophth 07/15/2025  Hospital Encounter and Summary Linked: Yes  ED to Hosp-Admission (Discharged) with Roslyn Goyal DO; Lobo Esquivel MD; Venkata Boyd MD (02/18/2025)     1 attempt to reach patient prior to PCP follow up appointment today 02/28/2025. Voicemail message was left.

## 2025-02-28 NOTE — PATIENT INSTRUCTIONS
In terms of diet, try to consume 60 grams of protein per day spread out amongst three meals (20 grams of protein per meal). Protein should come from sources such as meat, low fat dairy and eggs. When eating meals, always start with the protein. Try to avoid pasta, potatoes, bread, rice, fruit, peas and corn because they are high in sugars. Should be drinking 80 ounces of fluid per day. Recommended using a calorie counting karo such as [employer].

## 2025-03-02 ASSESSMENT — ENCOUNTER SYMPTOMS
FATIGUE: 1
COUGH: 1

## 2025-03-06 ENCOUNTER — APPOINTMENT (OUTPATIENT)
Dept: PRIMARY CARE | Facility: CLINIC | Age: 65
End: 2025-03-06
Payer: COMMERCIAL

## 2025-03-06 LAB
C PEPTIDE P FAST SERPL-MCNC: 1.62 NG/ML
C PEPTIDE [MASS/VOLUME] IN SERUM OR PLASMA --1ST SPECIMEN POST XXX CHALLENGE: 1.21 NG/ML
C PEPTIDE [MASS/VOLUME] IN SERUM OR PLASMA --2ND SPECIMEN POST XXX CHALLENGE: 2.33 NG/ML
C PEPTIDE [MASS/VOLUME] IN SERUM OR PLASMA --3RD SPECIMEN POST XXX CHALLENGE: 3.03 NG/ML
SERVICE CMNT-IMP: NORMAL
SPECIMEN DRAWN SERPL: NORMAL

## 2025-03-11 ENCOUNTER — PATIENT OUTREACH (OUTPATIENT)
Dept: PRIMARY CARE | Facility: CLINIC | Age: 65
End: 2025-03-11
Payer: COMMERCIAL

## 2025-03-11 NOTE — PROGRESS NOTES
Call regarding appt. with PCP on 02/28/2025 after hospitalization.  At time of outreach call the patient feels as if their condition has improved since last visit.  Reviewed the PCP appointment with the pt and addressed any questions or concerns.

## 2025-03-11 NOTE — DOCUMENTATION CLARIFICATION NOTE
PATIENT:               JENNIFER NUÑEZ  ACCT #:                  [id number]  MRN:                       [id number]  :                       1960  ADMIT DATE:       2025 9:50 AM  DISCH DATE:        2025 4:05 PM  RESPONDING PROVIDER #:        [id number]          PROVIDER RESPONSE TEXT:    no sepsis    CDI QUERY TEXT:    Clarification    Instruction:  Based on your assessment of the patient and the clinical information, please provide the requested documentation by clicking on the appropriate radio button and enter any additional information if prompted.    Question: Is there a diagnosis indicative of a patient meeting SIRS criteria and with organ dysfunction in the setting of Influenza    When answering this query, please exercise your independent professional judgment. The fact that a question is being asked, does not imply that any particular answer is desired or expected.    The patient's clinical indicators include:  Clinical Information: 64 year old female admitted on  with new onset chest pain.    Clinical Indicators:  -WBC: : 12.3  -venous lactate: : 2.8    -HR: : 95, 114, 97  : 100, 102    -RR:  : 23, 25  : 26, 27, 22  : 20,21, 22    -BP: : 75/48, 82/48, 78/51  : 79/49, 82/49, 80/46    Treatment: NaCl 1000ml IV bolus (), LR 1500ml IV bolus () 500ml bolus (), LR 500ml bolus (), Levophed 8mg (-), Ceftin 500mg PO (-), Tamiflu 30mg    Risk Factors: NSTEMI, Influenza, acute respiratory failure  Options provided:  -- Sepsis with cardiac and respiratory organ dysfunction of NSTEMI and acute respiratory failure  -- Other - I will add my own diagnosis  -- Refer to Clinical Documentation Reviewer    Query created by: Elis Quiles on 3/6/2025 7:12 AM      Electronically signed by:  SUZANNE PEOPLES MD 3/11/2025 5:32 PM

## 2025-03-14 ENCOUNTER — OFFICE VISIT (OUTPATIENT)
Dept: CARDIOLOGY | Facility: CLINIC | Age: 65
End: 2025-03-14
Payer: COMMERCIAL

## 2025-03-14 VITALS
BODY MASS INDEX: 23.62 KG/M2 | SYSTOLIC BLOOD PRESSURE: 92 MMHG | DIASTOLIC BLOOD PRESSURE: 60 MMHG | OXYGEN SATURATION: 99 % | WEIGHT: 125 LBS | HEART RATE: 78 BPM

## 2025-03-14 DIAGNOSIS — E78.5 HYPERLIPIDEMIA, UNSPECIFIED HYPERLIPIDEMIA TYPE: ICD-10-CM

## 2025-03-14 DIAGNOSIS — E11.9 DIABETES MELLITUS WITHOUT COMPLICATION (MULTI): ICD-10-CM

## 2025-03-14 DIAGNOSIS — I50.9 CONGESTIVE HEART FAILURE, UNSPECIFIED HF CHRONICITY, UNSPECIFIED HEART FAILURE TYPE: Primary | ICD-10-CM

## 2025-03-14 PROBLEM — F41.0 PANIC ATTACK: Status: ACTIVE | Noted: 2025-03-14

## 2025-03-14 PROBLEM — R07.89 CHEST WALL PAIN: Status: ACTIVE | Noted: 2022-01-15

## 2025-03-14 PROBLEM — G47.00 INSOMNIA: Status: ACTIVE | Noted: 2025-03-14

## 2025-03-14 PROBLEM — K57.92 DIVERTICULITIS OF INTESTINE: Status: ACTIVE | Noted: 2025-03-14

## 2025-03-14 PROCEDURE — 1036F TOBACCO NON-USER: CPT | Performed by: NURSE PRACTITIONER

## 2025-03-14 PROCEDURE — 3046F HEMOGLOBIN A1C LEVEL >9.0%: CPT | Performed by: NURSE PRACTITIONER

## 2025-03-14 PROCEDURE — 99214 OFFICE O/P EST MOD 30 MIN: CPT | Performed by: NURSE PRACTITIONER

## 2025-03-14 PROCEDURE — 3078F DIAST BP <80 MM HG: CPT | Performed by: NURSE PRACTITIONER

## 2025-03-14 PROCEDURE — 3074F SYST BP LT 130 MM HG: CPT | Performed by: NURSE PRACTITIONER

## 2025-03-14 PROCEDURE — 93005 ELECTROCARDIOGRAM TRACING: CPT | Performed by: NURSE PRACTITIONER

## 2025-03-14 RX ORDER — EMPAGLIFLOZIN 25 MG/1
25 TABLET, FILM COATED ORAL DAILY
COMMUNITY
Start: 2025-03-07

## 2025-03-14 ASSESSMENT — ENCOUNTER SYMPTOMS
CONSTITUTIONAL NEGATIVE: 1
GASTROINTESTINAL NEGATIVE: 1
DEPRESSION: 0
NEUROLOGICAL NEGATIVE: 1
LOSS OF SENSATION IN FEET: 0
RESPIRATORY NEGATIVE: 1
MUSCULOSKELETAL NEGATIVE: 1
OCCASIONAL FEELINGS OF UNSTEADINESS: 0
CARDIOVASCULAR NEGATIVE: 1

## 2025-03-14 ASSESSMENT — PAIN SCALES - GENERAL: PAINLEVEL_OUTOF10: 0-NO PAIN

## 2025-03-14 NOTE — PROGRESS NOTES
Chief Complaint:   Hospital Follow-up (Pt admitted to [facility] with an MI, discharged on 2/28/25, admitted with flu, MI, and high glucose. Pt had a heart cath while in hospital but she said they found no blockages.  Pt says she was in the hospital for 2 weeks. )    History Of Present Illness:    .Ms Shepard is here for hospital follow up.  Denies chest pain, sob, palpitations or pedal edema.  Sees [facility] on Monday.  Can't take any other statin due to myalgias.  Consider PCSK9.  Left without ecg, but has [facility] appointment early next week.         Last Recorded Vitals:  Blood pressure 92/60, pulse 78, weight 56.7 kg (125 lb), SpO2 99%.     Past Medical History:  Past Medical History:   Diagnosis Date    Acid reflux     Age-related nuclear cataract of both eyes 6/13/2024    Diabetes mellitus (Multi)     Herpes     Hiatal hernia     High cholesterol     History of HPV infection     IBS (irritable bowel syndrome)     Insomnia     Rosacea     Unspecified disorder of refraction 6/13/2024        Past Surgical History:  Past Surgical History:   Procedure Laterality Date    APPENDECTOMY  2005    CARDIAC CATHETERIZATION N/A 2/20/2025    Procedure: Left Heart Cath;  Surgeon: Navin Hughes DO;  Location: [facility] Cardiac Cath Lab;  Service: Cardiovascular;  Laterality: N/A;    COLONOSCOPY  2015    TORTUOUS COLON- REPEAT IN 5 YEARS--DR. HERNANDES    HERNIA REPAIR  01/2018    TUBAL LIGATION Bilateral 11/1991       Social History:  Social History     Socioeconomic History    Marital status:    Tobacco Use    Smoking status: Never     Passive exposure: Never    Smokeless tobacco: Never   Vaping Use    Vaping status: Never Used   Substance and Sexual Activity    Alcohol use: Not Currently     Comment: social    Drug use: Never    Sexual activity: Defer     Partners: Male     Birth control/protection: Post-menopausal     Social Drivers of Health     Financial Resource Strain: Low Risk  (2/20/2025)    Overall Financial Resource  Strain (CARDIA)     Difficulty of Paying Living Expenses: Not very hard   Food Insecurity: No Food Insecurity (2/20/2025)    Hunger Vital Sign     Worried About Running Out of Food in the Last Year: Never true     Ran Out of Food in the Last Year: Never true   Transportation Needs: No Transportation Needs (2/20/2025)    PRAPARE - Transportation     Lack of Transportation (Medical): No     Lack of Transportation (Non-Medical): No   Physical Activity: Insufficiently Active (2/20/2025)    Exercise Vital Sign     Days of Exercise per Week: 5 days     Minutes of Exercise per Session: 20 min   Stress: No Stress Concern Present (2/20/2025)    [employer] [employer] - Occupational Stress Questionnaire     Feeling of Stress : Not at all   Social Connections: Moderately Isolated (2/20/2025)    Social Connection and Isolation Panel [NHANES]     Frequency of Communication with Friends and Family: More than three times a week     Frequency of Social Gatherings with Friends and Family: More than three times a week     Attends Scientologist Services: More than 4 times per year     Active Member of Clubs or Organizations: No     Attends Club or Organization Meetings: Never     Marital Status:    Intimate Partner Violence: Not At Risk (2/20/2025)    Humiliation, Afraid, Rape, and Kick questionnaire     Fear of Current or Ex-Partner: No     Emotionally Abused: No     Physically Abused: No     Sexually Abused: No   Housing Stability: Low Risk  (2/20/2025)    Housing Stability Vital Sign     Unable to Pay for Housing in the Last Year: No     Number of Times Moved in the Last Year: 1     Homeless in the Last Year: No       Family History:  Family History   Problem Relation Name Age of Onset    Ulcerative colitis Mother      Throat cancer Father      Aneurysm Father          under his belly button - inherited    Leukemia Father      Other (abdominal aortic aneurysm) Father      Diabetes Father      Hypertension  "Father      Lupus Sister      Other (factor v) Sister      Blood clot Sister      Cancer Sister      Heart attack Brother  40    Testicular cancer Brother      Deep vein thrombosis Brother      Heart disease Brother      Hypertension Brother      Lupus Daughter      Ulcerative colitis Daughter      Obesity Daughter      Cancer Daughter      No Known Problems Daughter      No Known Problems Daughter      Other (abdominal aortic aneurysm) Paternal Grandfather      Lupus Half-Sister           Allergies:  Penicillin, Statins-hmg-coa reductase inhibitors, and Sulfamethoxazole-trimethoprim    Outpatient Medications:  Current Outpatient Medications   Medication Sig Dispense Refill    aspirin 81 mg chewable tablet Chew 1 tablet (81 mg) once daily.      blood-glu meter,cont-transmit misc as directed      blood-glucose sensor (Dexcom G7 Sensor) device Change every 10 DAYS 3 each 11    cholecalciferol (Vitamin D-3) 25 MCG (1000 UT) capsule Take 1 capsule (25 mcg) by mouth once every 24 hours.      cyanocobalamin (Vitamin B-12) 1,000 mcg tablet Take 1 tablet (1,000 mcg) by mouth once daily.      Dexcom G7 Sensor device Change every 10 days 3 each 11    empagliflozin (Jardiance) 10 mg Take 1 tablet (10 mg) by mouth once daily. 30 tablet 3    hydrOXYzine HCL (Atarax) 25 mg tablet TAKE TWO TABLETS BY MOUTH EVERY 8 HOURS FOR ANXIETY 270 tablet 3    insulin glargine-yfgn (Semglee,insulin glarg-yfgn,Pen) 100 unit/mL (3 mL) Pen Inject 12 units subcutaneously every morning as directed 15 mL 6    insulin lispro (HumaLOG KwikPen Insulin) 100 unit/mL injection Inject 6 units subcutaneously three times daily before meals as directed 15 mL 6    multivitamin tablet Take 1 tablet by mouth once daily.      PARoxetine (Paxil) 40 mg tablet Take 1.5 tablets (60 mg) by mouth once daily. 135 tablet 3    pen needle, diabetic (Sure Comfort Pen Needle) 32 gauge x 5/32\" needle Use four times dailiy with insulin 200 each 6    pravastatin (Pravachol) 20 " mg tablet Take 1 tablet (20 mg) by mouth once daily. 90 tablet 3    traZODone (Desyrel) 100 mg tablet TAKE TWO TABLETS BY MOUTH EVERY  tablet 3    Trulicity 4.5 mg/0.5 mL pen injector INJECT 4.5MG UNDER THE SKIN ONCE WEEKLY 2 mL 3    Jardiance 25 mg Take 1 tablet (25 mg) by mouth once daily. (Patient not taking: Reported on 3/14/2025)      metoprolol succinate XL (Toprol-XL) 25 mg 24 hr tablet Take 1 tablet (25 mg) by mouth once daily. Do not crush or chew. 30 tablet 3     No current facility-administered medications for this visit.        Physical Exam:  Cardiovascular:      PMI at left midclavicular line. Normal rate. Regular rhythm. Normal S1. Normal S2.       Murmurs: There is no murmur.      No gallop.  No click. No rub.   Pulses:     Intact distal pulses.   Edema:     Peripheral edema absent.       ROS:  Review of Systems   Constitutional: Negative.   Cardiovascular: Negative.    Respiratory: Negative.     Skin: Negative.    Musculoskeletal: Negative.    Gastrointestinal: Negative.    Genitourinary: Negative.    Neurological: Negative.           Last Labs:  CBC -  Lab Results   Component Value Date    WBC 7.0 02/27/2025    HGB 12.7 02/27/2025    HCT 39.0 02/27/2025    MCV 86 02/27/2025     (H) 02/27/2025       CMP -  Lab Results   Component Value Date    CALCIUM 8.5 (L) 02/27/2025    PHOS 4.8 02/27/2025    PROT 6.3 (L) 02/19/2025    ALBUMIN 3.2 (L) 02/27/2025    AST 34 02/19/2025    ALT 22 02/19/2025    ALKPHOS 87 02/19/2025    BILITOT 0.4 02/19/2025       LIPID PANEL -   Lab Results   Component Value Date    CHOL 179 02/19/2025    TRIG 417 (H) 02/19/2025    HDL 43.6 02/19/2025    CHHDL 4.1 02/19/2025    VLDL  02/19/2025      Comment:      Unable to calculate VLDL.    NHDL 135 02/19/2025       RENAL FUNCTION PANEL -   Lab Results   Component Value Date    GLUCOSE 91 02/27/2025     02/27/2025    K 3.9 02/27/2025     02/27/2025    CO2 31 02/27/2025    ANIONGAP 11 02/27/2025    BUN 13  02/27/2025    CREATININE 0.73 02/27/2025    CALCIUM 8.5 (L) 02/27/2025    PHOS 4.8 02/27/2025    ALBUMIN 3.2 (L) 02/27/2025        Lab Results   Component Value Date    BNP 90 02/18/2025    HGBA1C 11.2 (H) 02/18/2025    HGBA1C 7.2 (A) 12/05/2024         Assessment/Plan   Problem List Items Addressed This Visit    None    #1. acute non-ST elevation myocardial infarction.  Echocardiogram showed apical and anterior wall severe hypokinesis.  Cardiac catheterization showed no significant coronary disease with LINDA-3 flow.  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  Troponin elevated.  She may have myocarditis.  No indication for aspirin or NSAIDs.  Occasions for steroids.  No significant arrhythmias.  We will monitor for now.  She is weaning off pressors.  Keep maps above 65 mmHg.  Patient renal function stable.  No indication for heparin drip.  Will monitor.  Patient is not in cardiogenic shock.  I will hold off beta-blockers for today since her blood pressure remains on the low side.  Blood pressure better with the midodrine.  Maybe start beta-blocker tomorrow patient usually has low blood pressure at baseline.       2. Influenza A infection management by primary team.     3.  Hypotension.  Patient has underlying history of blood pressure running on the low side.  I will start on midodrine 10 mg oral daily.  Wean her off Levophed if she is on any.  Her renal function stable.  Monitor renal function and urine output.     4.  DM.  Uncontrolled.  Patient to see [facility].     Hospital course 02/2024 2/24: The patient was seen events reviewed in detail.  This patient does have a prior history of low normal blood pressure readings type 2 diabetes hyperlipidemia.  She was admitted with influenza A with echocardiogram showing moderately severe LV impairment estimated ejection fraction of 30-35%.  EKG tracings with diffuse precordial and limb lead T wave inversion suggestive of Takotsubo  cardiomyopathy.  The cardiac catheterization demonstrated angiographically normal coronary arteries.  Patient transferred from intensive care unit to stepdown unit placed on midodrine 10 mg 3 times daily which we will attempt to wean over the next 24 to 48 hours as long as systolic blood pressures are greater than 85 mmHg.  Following that we will attempt to resume very low-dose guideline directed medical therapy including low-dose beta-blockade and Entresto.  Patient had been on Jardiance prior to admission 25 mg daily will be restarted at 10 mg daily.  Will check serial EKG tracings.     2/25: The patient is resting comfortably no major complaints.  She does have a sister with a variety of hematologic abnormalities including prothrombin protein gene mutation along with TTP ITP and Sjogren syndrome.  The patient's EKG tracings continued to demonstrate signs of a Takotsubo cardiomyopathy with diffuse precordial and limb lead T wave inversions with QT prolongation.  Systolic blood pressures are in the 100 mmHg range are slightly lower.  Will discontinue midodrine and initiate very low-dose Toprol-XL 25 mg daily.  Will not utilize entresto at this time.  Anticipate that the patient will be prepared for discharge tomorrow and will arrange for follow-up and a repeat echocardiogram in 6 to 12 weeks to hopefully confirm return to normal LV ejection fraction.  Repeat CBC from today is unremarkable and the renal parameters are in normal range as well with a creatinine of 0.62.     2/26: Patient remains relatively asymptomatic.  Her systolic blood pressures are ranging between  mmHg with discontinuance of the midodrine and institution of very low-dose Toprol-XL 25 mg daily.  Will not attempt to utilize entresto.  Her EKG shows persistent diffuse T wave inversion abnormalities with QT prolongation consistent with a Takotsubo syndrome.  Patient's renal function parameter is in normal range and the CBC is relatively  unremarkable other than a hemoglobin of 11.3 hematocrit 35.7.  Will see patient in approximately 2 weeks for outpatient follow-up and then we will repeat echocardiogram in 5/2025.     2/27: The patient remains essentially asymptomatic denies lightheadedness.  Her systolic blood pressures have been in the 90 mmHg range which is her baseline.  She has been started on the low-dose Toprol-XL 25 mg daily.  The patient had been on Jardiance 10 mg daily prior to admission and this will be maintained.  Patient stable for discharge from the cardiac standpoint at this time.  She will be seen in approximately 2 weeks for follow-up at which time she will repeat EKG.  Will have a repeat echocardiogram performed in approximately 3 months in 5/2025 to hopefully confirm further resolution of the Takotsubo cardiomyopathy.      ASHIA Flores-CNP

## 2025-03-17 ENCOUNTER — OFFICE VISIT (OUTPATIENT)
Dept: CARDIOLOGY | Facility: CLINIC | Age: 65
End: 2025-03-17
Payer: COMMERCIAL

## 2025-03-17 VITALS
HEIGHT: 61 IN | DIASTOLIC BLOOD PRESSURE: 67 MMHG | OXYGEN SATURATION: 96 % | BODY MASS INDEX: 24.04 KG/M2 | WEIGHT: 127.31 LBS | SYSTOLIC BLOOD PRESSURE: 105 MMHG | HEART RATE: 66 BPM

## 2025-03-17 DIAGNOSIS — I50.9 CONGESTIVE HEART FAILURE, UNSPECIFIED HF CHRONICITY, UNSPECIFIED HEART FAILURE TYPE: Primary | ICD-10-CM

## 2025-03-17 DIAGNOSIS — I51.81 STRESS-INDUCED CARDIOMYOPATHY: ICD-10-CM

## 2025-03-17 PROCEDURE — 3078F DIAST BP <80 MM HG: CPT | Performed by: INTERNAL MEDICINE

## 2025-03-17 PROCEDURE — 99214 OFFICE O/P EST MOD 30 MIN: CPT | Performed by: INTERNAL MEDICINE

## 2025-03-17 PROCEDURE — 93010 ELECTROCARDIOGRAM REPORT: CPT | Performed by: INTERNAL MEDICINE

## 2025-03-17 PROCEDURE — G2211 COMPLEX E/M VISIT ADD ON: HCPCS | Performed by: INTERNAL MEDICINE

## 2025-03-17 PROCEDURE — 99214 OFFICE O/P EST MOD 30 MIN: CPT | Mod: 25 | Performed by: INTERNAL MEDICINE

## 2025-03-17 PROCEDURE — 3008F BODY MASS INDEX DOCD: CPT | Performed by: INTERNAL MEDICINE

## 2025-03-17 PROCEDURE — 93005 ELECTROCARDIOGRAM TRACING: CPT | Performed by: INTERNAL MEDICINE

## 2025-03-17 PROCEDURE — 3046F HEMOGLOBIN A1C LEVEL >9.0%: CPT | Performed by: INTERNAL MEDICINE

## 2025-03-17 PROCEDURE — 1036F TOBACCO NON-USER: CPT | Performed by: INTERNAL MEDICINE

## 2025-03-17 PROCEDURE — 3074F SYST BP LT 130 MM HG: CPT | Performed by: INTERNAL MEDICINE

## 2025-03-17 ASSESSMENT — PAIN SCALES - GENERAL: PAINLEVEL_OUTOF10: 0-NO PAIN

## 2025-03-17 ASSESSMENT — COLUMBIA-SUICIDE SEVERITY RATING SCALE - C-SSRS
2. HAVE YOU ACTUALLY HAD ANY THOUGHTS OF KILLING YOURSELF?: NO
6. HAVE YOU EVER DONE ANYTHING, STARTED TO DO ANYTHING, OR PREPARED TO DO ANYTHING TO END YOUR LIFE?: NO

## 2025-03-17 ASSESSMENT — ENCOUNTER SYMPTOMS
OCCASIONAL FEELINGS OF UNSTEADINESS: 0
LOSS OF SENSATION IN FEET: 0
DEPRESSION: 0

## 2025-03-17 NOTE — PATIENT INSTRUCTIONS
Thank you for coming to today's cardiology visit.  Based on my review of your medical record it seems very likely that you experienced a diagnosis called stress cardiomyopathy triggered by a surge of adrenaline relating to a severe case of influenza A.  The changes in your EKG during your hospitalization mimicked a heart attack.  Fortunately you are found to have no significant hardening of the heart arteries.  Your echocardiogram showed reduced heart function which I suspect will recover.  Please schedule follow-up office visit and an echocardiogram in 2 months.  I would be glad to see you but you can also see my colleague Dr. Freya June.  He is an excellent cardiologist.  Feel free to call me with any questions [phone] or message me through the [employer] karo.

## 2025-03-17 NOTE — PROGRESS NOTES
[facility] Physician: Sheila Cheney MD  Date of Visit: 2025  3:40 PM EDT  Location of visit: [facility] [address]     Chief Complaint:   Chief Complaint   Patient presents with    New Patient Visit     Previous MI    Hyperlipidemia   Chief complaint of post MI care.  Recent labs were reviewed     HPI / Summary:   Desiree Shepard is a 64 y.o. female presents for new cardiovascular evaluation. No ref. provider found   She came into the [facility] system on  with dull chest pain associated with a cough developed flulike symptoms after returning from a family trip to [place].  She proved positive for influenza A  Was COVID-negative.    Acutely hyperglycemic with glucose of 517 on presentation had formally been prediabetic    High-sensitivity troponin went up to 954  BNP of 90  Mild lactic acidosis 2.8    Beta hydroxybutyrate was elevated    No focal infiltrates on chest x-ray  Initial ECG was fairly normal but by the next day she was developing inferior as well as lateral and anterior deep symmetric T wave inversion    I had a cath she had no obstructive CAD    Segmental wall motion abnormalities suggestive of anteroseptal and apical infarct EF 30 to 35% consistent with stress cardiomyopathy.    Jardiance 10  Diabetes x 10 years  Now on Insulin, floows with Dr. Cheney    No tobacco  No ETOH  Works at [employer]     [facility] s/p appy, troy, hiatal hernia  3 pregnancies     age 73.  Precordial CP  Went to work, then to urgent care  LKWST  Was in almost two weeks  Was kept in 2/2 low BP and high bs.        Specialty Problems          Cardiology Problems    Hyperlipidemia    Dyslipidemia        Past Medical History:   Diagnosis Date    Acid reflux     Age-related nuclear cataract of both eyes 2024    Diabetes mellitus (Multi)     Herpes     Hiatal hernia     High cholesterol     History of HPV infection     IBS (irritable bowel syndrome)     Insomnia     Rosacea     Unspecified  disorder of refraction 6/13/2024          Past Surgical History:   Procedure Laterality Date    APPENDECTOMY  2005    CARDIAC CATHETERIZATION N/A 2/20/2025    Procedure: Left Heart Cath;  Surgeon: Navin Hughes DO;  Location: [facility] Cardiac Cath Lab;  Service: Cardiovascular;  Laterality: N/A;    COLONOSCOPY  2015    TORTUOUS COLON- REPEAT IN 5 YEARS--DR. HERNANDES    HERNIA REPAIR  01/2018    TUBAL LIGATION Bilateral 11/1991          Social History:   reports that she has never smoked. She has never been exposed to tobacco smoke. She has never used smokeless tobacco. She reports that she does not currently use alcohol. She reports that she does not use drugs.      Allergies:  Allergies   Allergen Reactions    Penicillin Other     Reaction: drying out from inside    Statins-Hmg-Coa Reductase Inhibitors Myalgia    Sulfamethoxazole-Trimethoprim Other     Reaction: suicidal       Outpatient Medications:  Current Outpatient Medications   Medication Instructions    aspirin 81 mg, oral, Daily    blood-glu meter,cont-transmit misc as directed    blood-glucose sensor (Dexcom G7 Sensor) device Change every 10 DAYS    cholecalciferol (Vitamin D-3) 25 MCG (1000 UT) capsule 1 capsule, Every 24 hours    cyanocobalamin (VITAMIN B-12) 1,000 mcg, Daily    Dexcom G7 Sensor device Change every 10 days    hydrOXYzine HCL (Atarax) 25 mg tablet TAKE TWO TABLETS BY MOUTH EVERY 8 HOURS FOR ANXIETY    insulin glargine-yfgn (Semglee,insulin glarg-yfgn,Pen) 100 unit/mL (3 mL) Pen Inject 12 units subcutaneously every morning as directed    insulin lispro (HumaLOG KwikPen Insulin) 100 unit/mL injection Inject 6 units subcutaneously three times daily before meals as directed    Jardiance 10 mg, oral, Daily    Jardiance 25 mg, Daily    metoprolol succinate XL (TOPROL-XL) 25 mg, oral, Daily, Do not crush or chew.    multivitamin tablet 1 tablet, Daily    PARoxetine (PAXIL) 60 mg, oral, Daily    pen needle, diabetic (Sure Comfort Pen Needle) 32 gauge  "x 5/32\" needle Use four times dailiy with insulin    pravastatin (PRAVACHOL) 20 mg, oral, Daily    traZODone (DESYREL) 200 mg, oral, Daily    Trulicity 4.5 mg/0.5 mL pen injector INJECT 4.5MG UNDER THE SKIN ONCE WEEKLY       ROS     Physical Exam:  Vitals:    03/17/25 1544 03/17/25 1548   BP: 94/61 105/67   BP Location: Right arm Left arm   Patient Position: Sitting Sitting   BP Cuff Size: Adult Adult   Pulse: 67 66   SpO2: 96%    Weight: 57.7 kg (127 lb 5 oz)    Height: 1.549 m (5' 1\")      Wt Readings from Last 5 Encounters:   03/17/25 57.7 kg (127 lb 5 oz)   03/14/25 56.7 kg (125 lb)   02/28/25 54 kg (119 lb)   02/27/25 60.3 kg (132 lb 15 oz)   02/18/25 54.4 kg (120 lb)     Body mass index is 24.06 kg/m².   Physical Exam   Well-appearing female in no acute distress.  Flat JVP.  Normal carotid upstrokes.  Regular rhythm without gallop.  She has no outflow tract murmur.  Her lungs were clear abdomen was soft no dependent edema with intact pedal pulses  Last Labs:  CMP:  Recent Labs     02/27/25  0543 02/26/25  0440 02/25/25  0512 02/24/25  0512 02/23/25  0428    139 141 141 139   K 3.9 3.9 3.6 3.5 4.1    105 106 105 103   CO2 31 28 29 30 31   ANIONGAP 11 10 10 10 9*   BUN 13 10 8 9 10   CREATININE 0.73 0.68 0.62 0.62 0.71   EGFR >90 >90 >90 >90 >90   GLUCOSE 91 81 56* 74 87     Recent Labs     02/27/25  0543 02/26/25  0440 02/25/25  0512 02/24/25  0512 02/23/25  0428 02/19/25  1601 02/19/25  0453 10/18/24  1040 03/05/24  1553 03/05/24  1553 09/19/23  0900 09/21/22  1413 10/11/21  1643 12/18/19  1705 08/15/19  1750   ALBUMIN 3.2* 3.0* 2.9* 2.8* 2.8*   < > 3.7 4.1   < > 4.9 4.2 4.4 4.5   < > 4.5   ALKPHOS  --   --   --   --   --   --  87 90  --  108 100 104 103   < > 117   ALT  --   --   --   --   --   --  22 15  --  10 10 15 13   < > 16   AST  --   --   --   --   --   --  34 19  --  17 15 18 17   < > 25   BILITOT  --   --   --   --   --   --  0.4 0.6  --  0.2 0.4 0.2 0.2   < > 0.3   LIPASE  --   --   --  " " --   --   --   --   --   --   --   --   --  45  --  160*    < > = values in this interval not displayed.     CBC:  Recent Labs     02/27/25  0543 02/26/25  0440 02/25/25  0512 02/24/25  0512 02/23/25  0428   WBC 7.0 6.1 6.8 6.4 6.3   HGB 12.7 11.3* 11.9* 11.5* 12.3   HCT 39.0 35.7* 37.5 35.5* 36.3   * 408 387 339 282   MCV 86 87 87 86 85     COAG: No results for input(s): \"INR\", \"DDIMERVTE\" in the last 36095 hours.  HEME/ENDO:  Recent Labs     02/18/25  1235 02/18/25  1114 12/05/24  1601 10/18/24  1040 09/05/24  1613 06/06/24  1527 12/04/23  1527 09/19/23  0900 09/21/22  1413   TSH  --  1.38  --  0.84  --   --   --  0.86 1.53   HGBA1C 11.2*  --  7.2*  --  7.4* 7.7*   < >  --   --     < > = values in this interval not displayed.      CARDIAC:   Recent Labs     02/22/25  0458 02/20/25  0728 02/18/25  1235 02/18/25  1114 02/18/25  1005   TROPHS 341* 1,788*  --  954* 382*   BNP  --   --  90  --   --      Recent Labs     02/19/25  0453 10/18/24  1040 09/19/23  0900   CHOL 179 218* 281*   HDL 43.6 70.3 67   TRIG 417* 107 161*       Last Cardiology Tests:  ECG:  Sinus rhythm at a heart rate of 64 bpm QT prolongation with anterolateral and inferior T wave inversion less deep appear to be evolving in comparison to previous records    Echo:  Echo Results:  Transthoracic Echo(TTE) Complete With 3D, Strain And Contrast 02/18/2025    [place] [address]  Phone [phone]    TRANSTHORACIC ECHOCARDIOGRAM REPORT    Patient Name:       JENNIFER NUÑEZ          Reading Physician:    [id number] Navin Hughes DO  Study Date:         2/18/2025            Ordering Provider:    [id number] NICOLAS LEVIN  MRN/PID:            [id number]             Fellow:  Accession#:         [id number]         Nurse:                Heena Mccoy RN  Date of Birth/Age:  1960 / 64 years Sonographer:          Ruthie MEADOWS  Gender Assigned at  F                    Additional " Staff:  Birth:  Height:             154.90 cm            Admit Date:           2/18/2025  Weight:             54.43 kg             Admission Status:     Inpatient -  Routine  BSA / BMI:          1.52 m2 / 22.69      Department Location:  kg/m2  Blood Pressure: 97 /64 mmHg    Study Type:    TRANSTHORACIC ECHO (TTE) COMPLETE  Diagnosis/ICD: Elevated Troponin-R79.89  Indication:    Elevated Troponin  CPT Codes:     Echo Complete w Full [id number]    Patient History:  Pertinent History: NSTEMI DM HLD Hypothroidism Carpal Tunnel.    Study Detail: The following Echo studies were performed: 2D, M-Mode, color flow,  Doppler, Strain and 3D. Definity used as a contrast agent for  endocardial border definition. A bubble study was not performed.      PHYSICIAN INTERPRETATION:  Left Ventricle: Left ventricular ejection fraction is moderately decreased, by visual estimate at 30-35%. There are no regional wall motion abnormalities. The left ventricular cavity size is mildly dilated. There is normal septal and normal posterior left ventricular wall thickness. Left Ventricular Global Longitudinal Strain - -5.9 %. Spectral Doppler shows a Grade I (impaired relaxation pattern) of left ventricular diastolic filling with normal left atrial filling pressure. Global hypokinesis with basilar sparing. the anterior and anteroseptal wall is thin and akinetic.  Left Atrium: The left atrial size is normal. A bubble study using agitated saline was not performed.  Right Ventricle: The right ventricle is normal in size. There is normal right ventricular global systolic function.  Right Atrium: The right atrial size is normal.  Aortic Valve: The aortic valve is trileaflet. There is no evidence of aortic valve regurgitation. The peak instantaneous gradient of the aortic valve is 4 mmHg.  Mitral Valve: The mitral valve is normal in structure. There is trace mitral valve regurgitation.  Tricuspid Valve: The tricuspid valve is structurally normal.  There is trace tricuspid regurgitation.  Pulmonic Valve: The pulmonic valve is not well visualized. The pulmonic valve regurgitation was not well visualized.  Pericardium: No pericardial effusion noted.  Aorta: The aortic root is normal.  Systemic Veins: The inferior vena cava appears normal in size, with IVC inspiratory collapse greater than 50%.      CONCLUSIONS:  1. Left ventricular ejection fraction is moderately decreased, by visual estimate at 30-35%.  2. Spectral Doppler shows a Grade I (impaired relaxation pattern) of left ventricular diastolic filling with normal left atrial filling pressure.  3. Left ventricular cavity size is mildly dilated.  4. Global hypokinesis with basilar sparing. the anterior and anteroseptal wall is thin and akinetic.  5. There is normal right ventricular global systolic function.    QUANTITATIVE DATA SUMMARY:    2D MEASUREMENTS:             Normal Ranges:  Ao Root s:       2.77 cm  IVSd:            0.78 cm     (0.6-1.1cm)  LVPWd:           0.70 cm     (0.6-1.1cm)  LVIDd:           4.14 cm     (3.9-5.9cm)  LVIDs:           3.42 cm  LV Mass Index:   58.8 g/m2  LVEDV Index:     59.08 ml/m2  LV % FS          17.3 %      LEFT ATRIUM:                  Normal Ranges:  LA Vol A4C:        53.6 ml    (22+/-6mL/m2)  LA Vol A2C:        62.0 ml  LA Vol BP:         58.2 ml  LA Vol Index A4C:  35.3ml/m2  LA Vol Index A2C:  40.8 ml/m2  LA Vol Index BP:   38.3 ml/m2  LA Area A4C:       17.4 cm2  LA Area A2C:       18.9 cm2  LA Major Axis A4C: 4.8 cm  LA Major Axis A2C: 4.9 cm  LA Volume Index:   38.5 ml/m2  LA Vol A4C:        50.6 ml  LA Vol A2C:        57.9 ml  LA Vol Index BSA:  35.7 ml/m2      RIGHT ATRIUM:                 Normal Ranges:  RA Vol A4C:        18.3 ml    (8.3-19.5ml)  RA Vol Index A4C:  12.0 ml/m2  RA Area A4C:       8.8 cm2  RA Major Axis A4C: 3.6 cm      M-MODE MEASUREMENTS:         Normal Ranges:  LAs:                 3.86 cm (2.7-4.0cm)      AORTA MEASUREMENTS:         Normal  Ranges:  Ao Sinus, d:        2.80 cm (2.1-3.5cm)  Ao STJ, d:          2.30 cm (1.7-3.4cm)  Asc Ao, d:          3.00 cm (2.1-3.4cm)      LV SYSTOLIC FUNCTION:  Normal Ranges:  EF-A4C View:                      35 % (>=55%)  EF-A2C View:                      27 %  EF-Biplane:                       30 %  EF-Visual:                        33 %  EF-3DQ:                           34 %  LV EF Reported:                   33 %  Global Longitudinal Strain (GLS): -6 %      LV DIASTOLIC FUNCTION:             Normal Ranges:  MV Peak E:             0.54 m/s    (0.7-1.2 m/s)  MV Peak A:             0.35 m/s    (0.42-0.7 m/s)  E/A Ratio:             1.54        (1.0-2.2)  MV e'                  0.060 m/s   (>8.0)  MV lateral e'          0.06 m/s  MV medial e'           0.06 m/s  MV A Dur:              106.57 msec  E/e' Ratio:            8.92        (<8.0)  PulmV Sys Nathaniel:         28.80 cm/s  PulmV Fitzgerald Nathaniel:        20.74 cm/s  PulmV S/D Nathaniel:         1.39  PulmV A Revs Nathaneil:      31.64 cm/s      MITRAL VALVE:          Normal Ranges:  MV DT:        133 msec (150-240msec)      AORTIC VALVE:           Normal Ranges:  AoV Vmax:      0.97 m/s (<=1.7m/s)  AoV Peak PG:   3.8 mmHg (<20mmHg)  LVOT Max Nathaniel:  0.82 m/s (<=1.1m/s)  LVOT VTI:      13.41 cm  LVOT Diameter: 1.95 cm  (1.8-2.4cm)  AoV Area,Vmax: 2.54 cm2 (2.5-4.5cm2)      RIGHT VENTRICLE:  RV Basal 2.70 cm  RV Mid   1.90 cm  RV Major 6.5 cm      TRICUSPID VALVE/RVSP:          Normal Ranges:  Peak TR Velocity:     2.86 m/s  RV Syst Pressure:     36 mmHg  (< 30mmHg)  IVC Diam:             1.61 cm      PULMONIC VALVE:          Normal Ranges:  PV Accel Time:  49 msec  (>120ms)  PV Max Nathaniel:     0.7 m/s  (0.6-0.9m/s)  PV Max P.8 mmHg      PULMONARY VEINS:  PulmV A Revs Nathaniel: 31.64 cm/s  PulmV Fitzgerald Nathaniel:   20.74 cm/s  PulmV S/D Nathaniel:    1.39  PulmV Sys Nathaniel:    28.80 cm/s      AORTA:  Asc Ao Diam 2.96 cm      [id number] Navin Hughes DO  Electronically signed on 2025 at 8:38:23  AM        ** Final **       Cath:      Stress Test:  Stress Results:  No results found for this or any previous visit from the past 365 days.         Cardiac Imaging:  ECG 12 Lead  Normal sinus rhythm  ST & T wave abnormality, consider inferior ischemia  ST & T wave abnormality, consider anterolateral ischemia  Prolonged QT  Abnormal ECG  When compared with ECG of 26-FEB-2025 06:39,  No significant change was found  Confirmed by Freya June ([id number]) on 2/27/2025 9:43:32 AM        Assessment/Plan     This very pleasant 64-year-old female presented with chest pain symptoms and acute EKG changes in the setting of influenza A infection.  Likely stress cardiomyopathy given no obstructive CAD.  I told her that complete cardiac recovery is likely and risk of recurrence although not 0 is relatively infrequent.  I suggested she see somebody myself or one of the [facility] cardiologist in 2 months for a follow-up visit and an echocardiogram.  Thank you for allowing me to participate in her care.  We talked about phase 2 cardiac rehab but given that she works a 40-hour week I do not think she could fit it into her busy schedule she is already fairly active.  Orders:  Orders Placed This Encounter   Procedures    ECG 12 lead (Clinic Performed)      Followup Appts:  Future Appointments   Date Time Provider Department Center   4/7/2025  2:30 PM Sheila Cheney MD [id number] [facility]   6/20/2025  9:00 AM Elana Villela APRN-CNP [id number] [facility]   7/16/2025  2:45 PM Alberto Betancur MD [id number] [facility]           ____________________________________________________________  Rey Campos MD    Senior Attending Physician  [facility]  [facility]

## 2025-03-18 LAB
ATRIAL RATE: 64 BPM
P AXIS: 72 DEGREES
P OFFSET: 201 MS
P ONSET: 149 MS
PR INTERVAL: 142 MS
Q ONSET: 220 MS
QRS COUNT: 11 BEATS
QRS DURATION: 86 MS
QT INTERVAL: 500 MS
QTC CALCULATION(BAZETT): 515 MS
QTC FREDERICIA: 510 MS
R AXIS: 59 DEGREES
T AXIS: 124 DEGREES
T OFFSET: 470 MS
VENTRICULAR RATE: 64 BPM

## 2025-04-04 ENCOUNTER — PATIENT OUTREACH (OUTPATIENT)
Dept: PRIMARY CARE | Facility: CLINIC | Age: 65
End: 2025-04-04
Payer: COMMERCIAL

## 2025-04-07 ENCOUNTER — APPOINTMENT (OUTPATIENT)
Dept: PRIMARY CARE | Facility: CLINIC | Age: 65
End: 2025-04-07
Payer: COMMERCIAL

## 2025-04-07 VITALS
OXYGEN SATURATION: 97 % | SYSTOLIC BLOOD PRESSURE: 100 MMHG | DIASTOLIC BLOOD PRESSURE: 62 MMHG | RESPIRATION RATE: 16 BRPM | WEIGHT: 128 LBS | HEIGHT: 61 IN | HEART RATE: 67 BPM | BODY MASS INDEX: 24.17 KG/M2

## 2025-04-07 DIAGNOSIS — Z12.31 SCREENING MAMMOGRAM FOR BREAST CANCER: ICD-10-CM

## 2025-04-07 DIAGNOSIS — Z23 ENCOUNTER FOR IMMUNIZATION: ICD-10-CM

## 2025-04-07 DIAGNOSIS — F33.1 MODERATE EPISODE OF RECURRENT MAJOR DEPRESSIVE DISORDER: ICD-10-CM

## 2025-04-07 DIAGNOSIS — Z00.00 ROUTINE GENERAL MEDICAL EXAMINATION AT A HEALTH CARE FACILITY: Primary | ICD-10-CM

## 2025-04-07 DIAGNOSIS — E78.2 MIXED HYPERLIPIDEMIA: ICD-10-CM

## 2025-04-07 DIAGNOSIS — E11.9 DIABETES MELLITUS WITHOUT COMPLICATION: ICD-10-CM

## 2025-04-07 PROCEDURE — 90677 PCV20 VACCINE IM: CPT | Performed by: INTERNAL MEDICINE

## 2025-04-07 PROCEDURE — 3078F DIAST BP <80 MM HG: CPT | Performed by: INTERNAL MEDICINE

## 2025-04-07 PROCEDURE — 3074F SYST BP LT 130 MM HG: CPT | Performed by: INTERNAL MEDICINE

## 2025-04-07 PROCEDURE — 99214 OFFICE O/P EST MOD 30 MIN: CPT | Performed by: INTERNAL MEDICINE

## 2025-04-07 PROCEDURE — 99396 PREV VISIT EST AGE 40-64: CPT | Performed by: INTERNAL MEDICINE

## 2025-04-07 PROCEDURE — 3008F BODY MASS INDEX DOCD: CPT | Performed by: INTERNAL MEDICINE

## 2025-04-07 PROCEDURE — 3046F HEMOGLOBIN A1C LEVEL >9.0%: CPT | Performed by: INTERNAL MEDICINE

## 2025-04-07 PROCEDURE — 90471 IMMUNIZATION ADMIN: CPT | Performed by: INTERNAL MEDICINE

## 2025-04-07 ASSESSMENT — ENCOUNTER SYMPTOMS
DIARRHEA: 0
CONSTIPATION: 0
HEADACHES: 0
NERVOUS/ANXIOUS: 1
CHILLS: 0
DEPRESSION: 0
VOMITING: 0
RHINORRHEA: 0
DYSURIA: 0
SLEEP DISTURBANCE: 0
JOINT SWELLING: 0
FREQUENCY: 0
FATIGUE: 1
SHORTNESS OF BREATH: 0
DIZZINESS: 0
ARTHRALGIAS: 0
COUGH: 0
APPETITE CHANGE: 0
SINUS PAIN: 0
SORE THROAT: 0
DIFFICULTY URINATING: 0
PALPITATIONS: 0
OCCASIONAL FEELINGS OF UNSTEADINESS: 0
WEAKNESS: 0
FEVER: 0
NAUSEA: 0
ABDOMINAL PAIN: 0
LOSS OF SENSATION IN FEET: 0
HEMATURIA: 0

## 2025-04-07 ASSESSMENT — PROMIS GLOBAL HEALTH SCALE
RATE_QUALITY_OF_LIFE: GOOD
RATE_AVERAGE_PAIN: 1
EMOTIONAL_PROBLEMS: SOMETIMES
RATE_SOCIAL_SATISFACTION: GOOD
CARRYOUT_SOCIAL_ACTIVITIES: GOOD
RATE_MENTAL_HEALTH: GOOD
RATE_GENERAL_HEALTH: GOOD
RATE_AVERAGE_FATIGUE: MILD
RATE_PHYSICAL_HEALTH: GOOD
CARRYOUT_PHYSICAL_ACTIVITIES: MOSTLY

## 2025-04-07 ASSESSMENT — PAIN SCALES - GENERAL: PAINLEVEL_OUTOF10: 0-NO PAIN

## 2025-04-07 NOTE — PATIENT INSTRUCTIONS
Needs to have a work accomodation letter allowing her to indefinitely take occasional breaks and not lift heavy objects above 25 pounds. Please increase samglee to 15 units. In terms of diet, try to consume 60 grams of protein per day spread out amongst three meals (20 grams of protein per meal). Protein should come from sources such as meat, low fat dairy and eggs. When eating meals, always start with the protein. Avoid pasta, potatoes, bread, rice, fruit, oatmeal, cereals, peas and corn because they are high in sugars. Lastly, please follow up with your obgyn for positive HPV on last PAP. Received prevnar 20 in office today.

## 2025-04-07 NOTE — PROGRESS NOTES
Subjective   Patient ID: Desiree Shepard is a 64 y.o. female who presents for Annual Exam.    Patient is doing well overall, but has noticed a lot of fluctuations with her blood sugars. Sees values that jump from really high (above 250) to really low. Morning sugars are usually around 130. Blood sugar values change sporadically at random times. Wears a DexCom. Phx of MI, was hospitalized with influenza pneumonia, myocarditis, ejection fraction 30%.  Cardiac cath was unremarkable.  She sees a cardiologist, obgyn, ophthalmologist and dentist regularly. Still gets occasionally fatigued but is ready to go back to work.  Works an active job but is able to negotiate that she can't lift heavy objects and may need occasional breaks. Tolerates her statin.  We discussed low-carb diet and counting carbohydrates.     Diagnostics Reviewed: 9/13/2022 Cologuard: was negative.          3/6/2024 BI Mammo Bilateral: was normal.          2/20/2025 Cardiac Catheterization: was normal.            2/19/2025 XR Chest: New bilateral perihilar infiltrates.               3/17/2025 TTE: Normal sinus rhythm T wave abnormality, consider anterolateral ischemia Prolonged QT. Abnormal ECG.  Labs Reviewed: 5/1/2024 PAP: HPV positive.        10/18/2024 Albumin Creatinine Ratio: wnl.        2/18/2025 Hemoglobin A1C: 11.2.       3/4/2025 C. Peptide: wnl.        Review of Systems   Constitutional:  Positive for fatigue. Negative for appetite change, chills and fever.   HENT:  Negative for ear pain, rhinorrhea, sinus pain and sore throat.    Eyes:  Negative for visual disturbance.   Respiratory:  Negative for cough and shortness of breath.    Cardiovascular:  Negative for chest pain and palpitations.   Gastrointestinal:  Negative for abdominal pain, constipation, diarrhea, nausea and vomiting.   Genitourinary:  Negative for difficulty urinating, dysuria, frequency and hematuria.   Musculoskeletal:  Negative for arthralgias and joint swelling.   Skin:   "Negative for rash.   Neurological:  Negative for dizziness, weakness and headaches.   Psychiatric/Behavioral:  Positive for behavioral problems (depression). Negative for sleep disturbance. The patient is nervous/anxious.      Objective   /62   Pulse 67   Resp 16   Ht 1.549 m (5' 1\")   Wt 58.1 kg (128 lb)   SpO2 97%   BMI 24.19 kg/m²     Physical Exam  HENT:      Right Ear: Tympanic membrane normal. There is no impacted cerumen.      Left Ear: Tympanic membrane normal. There is no impacted cerumen.      Mouth/Throat:      Pharynx: Oropharynx is clear. No oropharyngeal exudate.   Eyes:      Conjunctiva/sclera: Conjunctivae normal.      Pupils: Pupils are equal, round, and reactive to light.   Neck:      Thyroid: No thyromegaly.      Vascular: No carotid bruit.   Cardiovascular:      Rate and Rhythm: Regular rhythm.      Heart sounds: Normal heart sounds.   Pulmonary:      Breath sounds: Normal breath sounds.   Abdominal:      Palpations: Abdomen is soft. There is no hepatomegaly.      Tenderness: There is no abdominal tenderness.   Musculoskeletal:      Right lower leg: No edema.      Left lower leg: No edema.   Lymphadenopathy:      Cervical: No cervical adenopathy.   Skin:     General: Skin is warm.      Comments: No suspicious moles   Neurological:      Mental Status: She is alert and oriented to person, place, and time.      Gait: Gait is intact.   Psychiatric:         Mood and Affect: Mood and affect normal.         Behavior: Behavior normal. Behavior is cooperative.         Cognition and Memory: Cognition normal.     Assessment/Plan   Diagnoses and all orders for this visit:  Routine general medical examination at a health care facility  Diabetes mellitus without complication  -     Referral to Diabetes Education; Future  -     Referral to Endocrinology; Future  -     Hemoglobin A1C; Future  -     Comprehensive Metabolic Panel; Future  Screening mammogram for breast cancer  -     BI mammo bilateral " screening tomosynthesis  Encounter for immunization  -     Pneumococcal conjugate vaccine, 20-valent (PREVNAR 20)  Mixed hyperlipidemia  Moderate episode of recurrent major depressive disorder      Scribe Attestation  By signing my name below, I, Monica Christensen   attest that this documentation has been prepared under the direction and in the presence of Sheila Cheney MD.

## 2025-04-07 NOTE — LETTER
April 7, 2025     Patient: Desiree Shepard   YOB: 1960   Date of Visit: 4/7/2025       To Whom It May Concern:    Desiree Shepard was seen in my clinic on 4/7/2025 at 2:30 pm. Please excuse Desiree for her absence from work on this day to make the appointment. She is cleared to return to work on Tuesday April 8, 2025. She will need to be allowed to take occasional breaks and she is unable to lift anything over 25 lbs.     If you have any questions or concerns, please don't hesitate to call.         Sincerely,         Sheila Cheney MD        CC: No Recipients

## 2025-04-11 ENCOUNTER — TELEPHONE (OUTPATIENT)
Dept: NUTRITION | Facility: HOSPITAL | Age: 65
End: 2025-04-11
Payer: COMMERCIAL

## 2025-04-11 PROCEDURE — RXMED WILLOW AMBULATORY MEDICATION CHARGE

## 2025-04-11 NOTE — TELEPHONE ENCOUNTER
Left voicemail for patient to call to schedule with the [facility]. Number provided

## 2025-04-14 ENCOUNTER — PHARMACY VISIT (OUTPATIENT)
Dept: PHARMACY | Facility: CLINIC | Age: 65
End: 2025-04-14
Payer: COMMERCIAL

## 2025-04-18 ENCOUNTER — APPOINTMENT (OUTPATIENT)
Dept: RADIOLOGY | Facility: HOSPITAL | Age: 65
End: 2025-04-18
Payer: COMMERCIAL

## 2025-04-23 ENCOUNTER — APPOINTMENT (OUTPATIENT)
Dept: RADIOLOGY | Facility: HOSPITAL | Age: 65
End: 2025-04-23
Payer: COMMERCIAL

## 2025-04-23 VITALS — BODY MASS INDEX: 22.66 KG/M2 | HEIGHT: 61 IN | WEIGHT: 120 LBS

## 2025-04-23 PROCEDURE — 77067 SCR MAMMO BI INCL CAD: CPT

## 2025-04-23 PROCEDURE — 77063 BREAST TOMOSYNTHESIS BI: CPT | Performed by: RADIOLOGY

## 2025-04-23 PROCEDURE — 77067 SCR MAMMO BI INCL CAD: CPT | Performed by: RADIOLOGY

## 2025-05-07 ENCOUNTER — APPOINTMENT (OUTPATIENT)
Dept: PRIMARY CARE | Facility: CLINIC | Age: 65
End: 2025-05-07
Payer: COMMERCIAL

## 2025-05-07 VITALS
HEIGHT: 61 IN | OXYGEN SATURATION: 97 % | HEART RATE: 69 BPM | BODY MASS INDEX: 24.17 KG/M2 | RESPIRATION RATE: 16 BRPM | WEIGHT: 128 LBS | SYSTOLIC BLOOD PRESSURE: 108 MMHG | DIASTOLIC BLOOD PRESSURE: 62 MMHG

## 2025-05-07 DIAGNOSIS — F33.1 MODERATE EPISODE OF RECURRENT MAJOR DEPRESSIVE DISORDER: ICD-10-CM

## 2025-05-07 DIAGNOSIS — E78.2 MIXED HYPERLIPIDEMIA: ICD-10-CM

## 2025-05-07 DIAGNOSIS — E11.9 DIABETES MELLITUS WITHOUT COMPLICATION: Primary | ICD-10-CM

## 2025-05-07 PROCEDURE — 99213 OFFICE O/P EST LOW 20 MIN: CPT | Performed by: INTERNAL MEDICINE

## 2025-05-07 PROCEDURE — 3078F DIAST BP <80 MM HG: CPT | Performed by: INTERNAL MEDICINE

## 2025-05-07 PROCEDURE — 3008F BODY MASS INDEX DOCD: CPT | Performed by: INTERNAL MEDICINE

## 2025-05-07 PROCEDURE — 3046F HEMOGLOBIN A1C LEVEL >9.0%: CPT | Performed by: INTERNAL MEDICINE

## 2025-05-07 PROCEDURE — 3074F SYST BP LT 130 MM HG: CPT | Performed by: INTERNAL MEDICINE

## 2025-05-07 ASSESSMENT — ENCOUNTER SYMPTOMS
SHORTNESS OF BREATH: 0
FATIGUE: 0
PALPITATIONS: 0
NAUSEA: 0
CONSTIPATION: 0
OCCASIONAL FEELINGS OF UNSTEADINESS: 0
ARTHRALGIAS: 0
DIARRHEA: 0
LOSS OF SENSATION IN FEET: 0
ROS GI COMMENTS: HEARTBURN
DIZZINESS: 0
DEPRESSION: 0
ABDOMINAL PAIN: 0
COUGH: 0

## 2025-05-07 ASSESSMENT — PAIN SCALES - GENERAL: PAINLEVEL_OUTOF10: 4

## 2025-05-07 NOTE — PROGRESS NOTES
"Subjective   Patient ID: Desiree Shepard is a 64 y.o. female who presents for 1 month check up.    Recently, patient started going back to work. Is still wearing her glucometer and normally sees values below 200. Usually sees morning values between 119-136. Follows a low carbohydrate diet and works an active job. Gets occasional episodes of heartburn which can be alleviated with peptobismol.     Diagnostics Reviewed: 2/20/2025 Cardiac Catheterization.   Labs Reviewed: 2/19/2025 Lipid Panel:  and triglycerides 417.          Review of Systems   Constitutional:  Negative for fatigue.   Respiratory:  Negative for cough and shortness of breath.    Cardiovascular:  Negative for chest pain and palpitations.   Gastrointestinal:  Negative for abdominal pain, constipation, diarrhea and nausea.        Heartburn   Musculoskeletal:  Negative for arthralgias.   Neurological:  Negative for dizziness.     Objective   /62   Pulse 69   Resp 16   Ht 1.549 m (5' 1\")   Wt 58.1 kg (128 lb)   SpO2 97%   BMI 24.19 kg/m²     Physical Exam  Cardiovascular:      Rate and Rhythm: Normal rate and regular rhythm.      Heart sounds: Normal heart sounds.   Pulmonary:      Breath sounds: Normal breath sounds.   Abdominal:      Palpations: Abdomen is soft. There is no hepatomegaly.      Tenderness: There is no abdominal tenderness.   Musculoskeletal:      Right lower leg: No edema.      Left lower leg: No edema.   Neurological:      Mental Status: She is alert and oriented to person, place, and time.      Gait: Gait normal.   Psychiatric:         Mood and Affect: Mood normal.         Behavior: Behavior normal.         Assessment/Plan   Diagnoses and all orders for this visit:  Diabetes mellitus without complication  Moderate episode of recurrent major depressive disorder  Mixed hyperlipidemia  Will continue same meds, monitor blood glucose    Scribe Attestation  By signing my name below, IKeyur, Scribmayco   attest that this " documentation has been prepared under the direction and in the presence of Sheila Cheney MD.

## 2025-05-13 ENCOUNTER — PATIENT OUTREACH (OUTPATIENT)
Dept: PRIMARY CARE | Facility: CLINIC | Age: 65
End: 2025-05-13
Payer: COMMERCIAL

## 2025-05-28 PROCEDURE — RXMED WILLOW AMBULATORY MEDICATION CHARGE

## 2025-06-02 ENCOUNTER — PHARMACY VISIT (OUTPATIENT)
Dept: PHARMACY | Facility: CLINIC | Age: 65
End: 2025-06-02
Payer: COMMERCIAL

## 2025-06-02 PROCEDURE — RXMED WILLOW AMBULATORY MEDICATION CHARGE

## 2025-06-04 ENCOUNTER — PHARMACY VISIT (OUTPATIENT)
Dept: PHARMACY | Facility: CLINIC | Age: 65
End: 2025-06-04
Payer: COMMERCIAL

## 2025-06-17 ENCOUNTER — HOSPITAL ENCOUNTER (OUTPATIENT)
Dept: CARDIOLOGY | Facility: CLINIC | Age: 65
Discharge: HOME | End: 2025-06-17
Payer: COMMERCIAL

## 2025-06-17 DIAGNOSIS — I51.81 STRESS-INDUCED CARDIOMYOPATHY: ICD-10-CM

## 2025-06-17 DIAGNOSIS — I51.89 OTHER ILL-DEFINED HEART DISEASES: ICD-10-CM

## 2025-06-17 DIAGNOSIS — I50.9 CONGESTIVE HEART FAILURE, UNSPECIFIED HF CHRONICITY, UNSPECIFIED HEART FAILURE TYPE: ICD-10-CM

## 2025-06-17 PROCEDURE — 93306 TTE W/DOPPLER COMPLETE: CPT | Performed by: INTERNAL MEDICINE

## 2025-06-17 PROCEDURE — 93306 TTE W/DOPPLER COMPLETE: CPT

## 2025-06-20 ENCOUNTER — OFFICE VISIT (OUTPATIENT)
Dept: CARDIOLOGY | Facility: CLINIC | Age: 65
End: 2025-06-20
Payer: COMMERCIAL

## 2025-06-20 VITALS
WEIGHT: 132 LBS | HEART RATE: 62 BPM | OXYGEN SATURATION: 99 % | SYSTOLIC BLOOD PRESSURE: 107 MMHG | HEIGHT: 61 IN | DIASTOLIC BLOOD PRESSURE: 71 MMHG | BODY MASS INDEX: 24.92 KG/M2

## 2025-06-20 DIAGNOSIS — R94.31 ABNORMAL EKG: Primary | ICD-10-CM

## 2025-06-20 DIAGNOSIS — I50.9 CONGESTIVE HEART FAILURE, UNSPECIFIED HF CHRONICITY, UNSPECIFIED HEART FAILURE TYPE: ICD-10-CM

## 2025-06-20 LAB
AORTIC VALVE MEAN GRADIENT: 4 MMHG
AORTIC VALVE PEAK VELOCITY: 1.34 M/S
ATRIAL RATE: 56 BPM
AV PEAK GRADIENT: 7 MMHG
AVA (PEAK VEL): 2.2 CM2
AVA (VTI): 2.16 CM2
EJECTION FRACTION APICAL 4 CHAMBER: 70.3
EJECTION FRACTION: 63 %
LEFT ATRIUM VOLUME AREA LENGTH INDEX BSA: 23 ML/M2
LEFT VENTRICLE INTERNAL DIMENSION DIASTOLE: 3.99 CM (ref 3.5–6)
LEFT VENTRICULAR OUTFLOW TRACT DIAMETER: 1.96 CM
MITRAL VALVE E/A RATIO: 1.22
P AXIS: 47 DEGREES
P OFFSET: 205 MS
P ONSET: 157 MS
PR INTERVAL: 126 MS
Q ONSET: 220 MS
QRS COUNT: 10 BEATS
QRS DURATION: 94 MS
QT INTERVAL: 482 MS
QTC CALCULATION(BAZETT): 465 MS
QTC FREDERICIA: 471 MS
R AXIS: 0 DEGREES
RIGHT VENTRICLE FREE WALL PEAK S': 13 CM/S
RIGHT VENTRICLE PEAK SYSTOLIC PRESSURE: 33 MMHG
T AXIS: 62 DEGREES
T OFFSET: 461 MS
TRICUSPID ANNULAR PLANE SYSTOLIC EXCURSION: 2.5 CM
VENTRICULAR RATE: 56 BPM

## 2025-06-20 PROCEDURE — 93005 ELECTROCARDIOGRAM TRACING: CPT | Performed by: NURSE PRACTITIONER

## 2025-06-20 PROCEDURE — 99214 OFFICE O/P EST MOD 30 MIN: CPT | Performed by: NURSE PRACTITIONER

## 2025-06-20 PROCEDURE — 3008F BODY MASS INDEX DOCD: CPT | Performed by: NURSE PRACTITIONER

## 2025-06-20 PROCEDURE — 93010 ELECTROCARDIOGRAM REPORT: CPT | Performed by: INTERNAL MEDICINE

## 2025-06-20 PROCEDURE — 3078F DIAST BP <80 MM HG: CPT | Performed by: NURSE PRACTITIONER

## 2025-06-20 PROCEDURE — 3074F SYST BP LT 130 MM HG: CPT | Performed by: NURSE PRACTITIONER

## 2025-06-20 PROCEDURE — 1036F TOBACCO NON-USER: CPT | Performed by: NURSE PRACTITIONER

## 2025-06-20 PROCEDURE — 99212 OFFICE O/P EST SF 10 MIN: CPT

## 2025-06-20 PROCEDURE — 3046F HEMOGLOBIN A1C LEVEL >9.0%: CPT | Performed by: NURSE PRACTITIONER

## 2025-06-20 ASSESSMENT — ENCOUNTER SYMPTOMS
GASTROINTESTINAL NEGATIVE: 1
OCCASIONAL FEELINGS OF UNSTEADINESS: 0
RESPIRATORY NEGATIVE: 1
CONSTITUTIONAL NEGATIVE: 1
CARDIOVASCULAR NEGATIVE: 1
NEUROLOGICAL NEGATIVE: 1
MUSCULOSKELETAL NEGATIVE: 1
LOSS OF SENSATION IN FEET: 0
DEPRESSION: 0

## 2025-06-20 ASSESSMENT — PAIN SCALES - GENERAL: PAINLEVEL_OUTOF10: 2

## 2025-06-20 NOTE — PROGRESS NOTES
"Chief Complaint:   fuv    History Of Present Illness:    .Ms Shepard is here for follow up.  Denies chest pain, sob, palpitations or pedal edema.  Can't take any other statin due to myalgias.  Consider PCSK9. She had an echo done today which showed recovery of EF.  ECG done showed sinus bradycardia and upright T waves.                Last Recorded Vitals:  Blood pressure 107/71, pulse 62, height 1.549 m (5' 1\"), weight 59.9 kg (132 lb), SpO2 99%.     Past Medical History:  Medical History[1]     Past Surgical History:  Surgical History[2]    Social History:  Social History[3]    Family History:  Family History[4]      Allergies:  Penicillin, Statins-hmg-coa reductase inhibitors, and Sulfamethoxazole-trimethoprim    Outpatient Medications:  Current Medications[5]     Physical Exam:  Cardiovascular:      PMI at left midclavicular line. Normal rate. Regular rhythm. Normal S1. Normal S2.       Murmurs: There is no murmur.      No gallop.  No click. No rub.   Pulses:     Intact distal pulses.   Edema:     Peripheral edema absent.         ROS:  Review of Systems   Constitutional: Negative.   Cardiovascular: Negative.    Respiratory: Negative.     Skin: Negative.    Musculoskeletal: Negative.    Gastrointestinal: Negative.    Genitourinary: Negative.    Neurological: Negative.           Last Labs: CBC RFP FLP A1C TSH reveiwed from 02/2025  CBC -  Lab Results   Component Value Date    WBC 7.0 02/27/2025    HGB 12.7 02/27/2025    HCT 39.0 02/27/2025    MCV 86 02/27/2025     (H) 02/27/2025       CMP -  Lab Results   Component Value Date    CALCIUM 8.5 (L) 02/27/2025    PHOS 4.8 02/27/2025    PROT 6.3 (L) 02/19/2025    ALBUMIN 3.2 (L) 02/27/2025    AST 34 02/19/2025    ALT 22 02/19/2025    ALKPHOS 87 02/19/2025    BILITOT 0.4 02/19/2025       LIPID PANEL -   Lab Results   Component Value Date    CHOL 179 02/19/2025    TRIG 417 (H) 02/19/2025    HDL 43.6 02/19/2025    CHHDL 4.1 02/19/2025    VLDL  02/19/2025      Comment: "      Unable to calculate VLDL.    NHDL 135 02/19/2025       RENAL FUNCTION PANEL -   Lab Results   Component Value Date    GLUCOSE 91 02/27/2025     02/27/2025    K 3.9 02/27/2025     02/27/2025    CO2 31 02/27/2025    ANIONGAP 11 02/27/2025    BUN 13 02/27/2025    CREATININE 0.73 02/27/2025    CALCIUM 8.5 (L) 02/27/2025    PHOS 4.8 02/27/2025    ALBUMIN 3.2 (L) 02/27/2025        Lab Results   Component Value Date    BNP 90 02/18/2025    HGBA1C 11.2 (H) 02/18/2025    HGBA1C 7.2 (A) 12/05/2024         Assessment/Plan   Problem List Items Addressed This Visit    None      #1. acute non-ST elevation myocardial infarction.  Echocardiogram showed apical and anterior wall severe hypokinesis.  Cardiac catheterization showed no significant coronary disease with LINDA-3 flow.  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  Troponin elevated.  She may have myocarditis.  No indication for aspirin or NSAIDs.  Occasions for steroids.  No significant arrhythmias.  We will monitor for now.  She is weaning off pressors.  Keep maps above 65 mmHg.  Patient renal function stable.  No indication for heparin drip.  Will monitor.  Patient is not in cardiogenic shock.  I will hold off beta-blockers for today since her blood pressure remains on the low side.  Blood pressure better with the midodrine.  Maybe start beta-blocker tomorrow patient usually has low blood pressure at baseline.  Is on metoprolol succinate 25 mg daily with bp 107/71.     2. Influenza A infection management by primary team. Reviewed.     3.  Hypotension.  Patient has underlying history of blood pressure running on the low side.  I will start on midodrine 10 mg oral daily.  Wean her off Levophed if she is on any.  Her renal function stable.  Monitor renal function and urine output.  107/71 without symptoms of lightheadedness, dizziness, presyncope or syncope.     4.  DM.  Uncontrolled.  Patient to see [facility]. Saw Dr Campos.       5.  Takotsubo CM.   Hospital course 02/2024 2/24: The patient was seen events reviewed in detail.  This patient does have a prior history of low normal blood pressure readings type 2 diabetes hyperlipidemia.  She was admitted with influenza A with echocardiogram showing moderately severe LV impairment estimated ejection fraction of 30-35%.  EKG tracings with diffuse precordial and limb lead T wave inversion suggestive of Takotsubo cardiomyopathy.  The cardiac catheterization demonstrated angiographically normal coronary arteries.  Patient transferred from intensive care unit to stepdown unit placed on midodrine 10 mg 3 times daily which we will attempt to wean over the next 24 to 48 hours as long as systolic blood pressures are greater than 85 mmHg.  Following that we will attempt to resume very low-dose guideline directed medical therapy including low-dose beta-blockade and Entresto.  Patient had been on Jardiance prior to admission 25 mg daily will be restarted at 10 mg daily.  Will check serial EKG tracings.     2/25: The patient is resting comfortably no major complaints.  She does have a sister with a variety of hematologic abnormalities including prothrombin protein gene mutation along with TTP ITP and Sjogren syndrome.  The patient's EKG tracings continued to demonstrate signs of a Takotsubo cardiomyopathy with diffuse precordial and limb lead T wave inversions with QT prolongation.  Systolic blood pressures are in the 100 mmHg range are slightly lower.  Will discontinue midodrine and initiate very low-dose Toprol-XL 25 mg daily.  Will not utilize entresto at this time.  Anticipate that the patient will be prepared for discharge tomorrow and will arrange for follow-up and a repeat echocardiogram in 6 to 12 weeks to hopefully confirm return to normal LV ejection fraction.  Repeat CBC from today is unremarkable and the renal parameters are in normal range as well with a creatinine of 0.62.     2/26:  Patient remains relatively asymptomatic.  Her systolic blood pressures are ranging between  mmHg with discontinuance of the midodrine and institution of very low-dose Toprol-XL 25 mg daily.  Will not attempt to utilize entresto.  Her EKG shows persistent diffuse T wave inversion abnormalities with QT prolongation consistent with a Takotsubo syndrome.  Patient's renal function parameter is in normal range and the CBC is relatively unremarkable other than a hemoglobin of 11.3 hematocrit 35.7.  Will see patient in approximately 2 weeks for outpatient follow-up and then we will repeat echocardiogram in 5/2025.     2/27: The patient remains essentially asymptomatic denies lightheadedness.  Her systolic blood pressures have been in the 90 mmHg range which is her baseline.  She has been started on the low-dose Toprol-XL 25 mg daily.  The patient had been on Jardiance 10 mg daily prior to admission and this will be maintained.  Patient stable for discharge from the cardiac standpoint at this time.  She will be seen in approximately 2 weeks for follow-up at which time she will repeat EKG.  Will have a repeat echocardiogram performed in approximately 3 months in 5/2025 to hopefully confirm further resolution of the Takotsubo cardiomyopathy.     She had an echo done today which showed recovery of EF.  ECG done showed sinus bradycardia and upright T waves.    Elana Villela, APRN-CNP       [1]   Past Medical History:  Diagnosis Date    Acid reflux     Age-related nuclear cataract of both eyes 06/13/2024    Diabetes mellitus (Multi)     Herpes     Hiatal hernia     High cholesterol     History of HPV infection     IBS (irritable bowel syndrome)     Insomnia     Myocardial infarction (Multi)     Rosacea     Unspecified disorder of refraction 06/13/2024   [2]   Past Surgical History:  Procedure Laterality Date    APPENDECTOMY  2005    CARDIAC CATHETERIZATION N/A 2/20/2025    Procedure: Left Heart Cath;  Surgeon: Navin DOWD  DO Saul;  Location: [facility];  Service: Cardiovascular;  Laterality: N/A;    COLONOSCOPY  2015    TORTUOUS COLON- REPEAT IN 5 YEARS--DR. HERNANDES    HERNIA REPAIR  01/2018    TUBAL LIGATION Bilateral 11/1991   [3]   Social History  Socioeconomic History    Marital status:    Tobacco Use    Smoking status: Never     Passive exposure: Never    Smokeless tobacco: Never   Vaping Use    Vaping status: Never Used   Substance and Sexual Activity    Alcohol use: Yes     Comment: social    Drug use: Never    Sexual activity: Defer     Partners: Male     Birth control/protection: Post-menopausal     Social Drivers of Health     Financial Resource Strain: Low Risk  (2/20/2025)    Overall Financial Resource Strain (CARDIA)     Difficulty of Paying Living Expenses: Not very hard   Food Insecurity: No Food Insecurity (2/20/2025)    Hunger Vital Sign     Worried About Running Out of Food in the Last Year: Never true     Ran Out of Food in the Last Year: Never true   Transportation Needs: No Transportation Needs (2/20/2025)    PRAPARE - Transportation     Lack of Transportation (Medical): No     Lack of Transportation (Non-Medical): No   Physical Activity: Insufficiently Active (2/20/2025)    Exercise Vital Sign     Days of Exercise per Week: 5 days     Minutes of Exercise per Session: 20 min   Stress: No Stress Concern Present (2/20/2025)    [employer] [place] [employer] - Occupational Stress Questionnaire     Feeling of Stress : Not at all   Social Connections: Moderately Isolated (2/20/2025)    Social Connection and Isolation Panel [NHANES]     Frequency of Communication with Friends and Family: More than three times a week     Frequency of Social Gatherings with Friends and Family: More than three times a week     Attends Zoroastrian Services: More than 4 times per year     Active Member of Clubs or Organizations: No     Attends Club or Organization Meetings: Never     Marital Status:     Intimate Partner Violence: Not At Risk (2/20/2025)    Humiliation, Afraid, Rape, and Kick questionnaire     Fear of Current or Ex-Partner: No     Emotionally Abused: No     Physically Abused: No     Sexually Abused: No   Housing Stability: Low Risk  (2/20/2025)    Housing Stability Vital Sign     Unable to Pay for Housing in the Last Year: No     Number of Times Moved in the Last Year: 1     Homeless in the Last Year: No   [4]   Family History  Problem Relation Name Age of Onset    Ulcerative colitis Mother      Throat cancer Father Babar Galeanoaro     Aneurysm Father Babar Cruz         under his belly button - inherited    Leukemia Father Babar Cruz     Other (abdominal aortic aneurysm) Father Babar Cruz     Diabetes Father Babar Galeaonaro     Hypertension Father Babar Galeanoaro     Depression Father Babar Cruz     Cancer Father Babar Galeanoaro     Lupus Sister Shruthi     Other (factor v) Sister Shruthi     Blood clot Sister Shruthi     Cancer Sister Shruthi     Heart attack Brother Al Dodaro 40    Testicular cancer Brother Al Dodaro     Deep vein thrombosis Brother Al Dodaro     Heart disease Brother Al Dodaro     Hypertension Brother Al Dodaro     Lupus Daughter Erika     Ulcerative colitis Daughter Erika     Obesity Daughter Erika     Cancer Daughter Erika     No Known Problems Daughter      No Known Problems Daughter      Other (abdominal aortic aneurysm) Paternal Grandfather      Lupus Half-Sister      Clotting disorder Sister Vy dodaro     Cancer Sister Vy dodaro     Stroke Sister Vy nitoaro     Heart murmur Brother Babar cruz lll    [5]   Current Outpatient Medications   Medication Sig Dispense Refill    aspirin 81 mg chewable tablet Chew 1 tablet (81 mg) once daily.      blood-glu meter,cont-transmit misc as directed      blood-glucose sensor (Dexcom G7 Sensor) device Change every 10 DAYS 3 each 11    cholecalciferol (Vitamin D-3) 25 MCG (1000 UT) capsule Take 1 capsule (25 mcg) by mouth once every 24 hours.       "cyanocobalamin (Vitamin B-12) 1,000 mcg tablet Take 1 tablet (1,000 mcg) by mouth once daily.      Dexcom G7 Sensor device Change every 10 days 3 each 11    hydrOXYzine HCL (Atarax) 25 mg tablet TAKE TWO TABLETS BY MOUTH EVERY 8 HOURS FOR ANXIETY 270 tablet 3    insulin glargine-yfgn (Semglee,insulin glarg-yfgn,Pen) 100 unit/mL (3 mL) pen Inject 12 units subcutaneously every morning as directed 15 mL 6    Jardiance 25 mg tablet TAKE ONE TABLET BY MOUTH EVERY DAY 90 tablet 3    metoprolol succinate XL (Toprol-XL) 25 mg 24 hr tablet Take 1 tablet (25 mg) by mouth once daily. Do not crush or chew. 30 tablet 3    multivitamin tablet Take 1 tablet by mouth once daily.      PARoxetine (Paxil) 40 mg tablet Take 1.5 tablets (60 mg) by mouth once daily. 135 tablet 3    pen needle, diabetic (Sure Comfort Pen Needle) 32 gauge x 5/32\" needle Use four times dailiy with insulin 200 each 6    pravastatin (Pravachol) 20 mg tablet Take 1 tablet (20 mg) by mouth once daily. 90 tablet 3    traZODone (Desyrel) 100 mg tablet TAKE TWO TABLETS BY MOUTH EVERY  tablet 3    Trulicity 4.5 mg/0.5 mL pen injector INJECT 4.5MG UNDER THE SKIN ONCE WEEKLY 2 mL 3     No current facility-administered medications for this visit.     "

## 2025-06-23 ENCOUNTER — TELEPHONE (OUTPATIENT)
Dept: CARDIOLOGY | Facility: CLINIC | Age: 65
End: 2025-06-23
Payer: COMMERCIAL

## 2025-06-23 NOTE — TELEPHONE ENCOUNTER
----- Message from Rey Campos sent at 6/23/2025  9:36 AM EDT -----  Let her know echo showed normal heart function which is recovered good result  ----- Message -----  From: Porsche Syngo - Cardiology Results In  Sent: 6/20/2025   8:18 PM EDT  To: Rey Campos MD

## 2025-06-24 ENCOUNTER — TELEPHONE (OUTPATIENT)
Dept: CARDIOLOGY | Facility: CLINIC | Age: 65
End: 2025-06-24

## 2025-07-11 ENCOUNTER — APPOINTMENT (OUTPATIENT)
Dept: ENDOCRINOLOGY | Facility: CLINIC | Age: 65
End: 2025-07-11
Payer: COMMERCIAL

## 2025-07-15 ENCOUNTER — APPOINTMENT (OUTPATIENT)
Dept: OPHTHALMOLOGY | Facility: CLINIC | Age: 65
End: 2025-07-15
Payer: COMMERCIAL

## 2025-07-16 ENCOUNTER — OFFICE VISIT (OUTPATIENT)
Dept: OPHTHALMOLOGY | Facility: CLINIC | Age: 65
End: 2025-07-16
Payer: COMMERCIAL

## 2025-07-16 DIAGNOSIS — E11.9 TYPE 2 DIABETES MELLITUS WITHOUT COMPLICATION, WITH LONG-TERM CURRENT USE OF INSULIN: Primary | ICD-10-CM

## 2025-07-16 DIAGNOSIS — Z79.4 TYPE 2 DIABETES MELLITUS WITHOUT COMPLICATION, WITH LONG-TERM CURRENT USE OF INSULIN: Primary | ICD-10-CM

## 2025-07-16 DIAGNOSIS — H52.7 UNSPECIFIED DISORDER OF REFRACTION: ICD-10-CM

## 2025-07-16 DIAGNOSIS — H25.13 AGE-RELATED NUCLEAR CATARACT OF BOTH EYES: ICD-10-CM

## 2025-07-16 PROCEDURE — 99214 OFFICE O/P EST MOD 30 MIN: CPT | Performed by: OPHTHALMOLOGY

## 2025-07-16 PROCEDURE — 92015 DETERMINE REFRACTIVE STATE: CPT | Performed by: OPHTHALMOLOGY

## 2025-07-16 ASSESSMENT — REFRACTION_MANIFEST
OS_SPHERE: +1.00
OD_SPHERE: +0.50
OD_SPHERE: +0.75
OD_CYLINDER: -1.75
OD_CYLINDER: -1.50
OS_CYLINDER: -0.75
OS_ADD: +2.50
OD_AXIS: 090
OS_AXIS: 065
OD_AXIS: 100
METHOD_AUTOREFRACTION: 1
OD_ADD: +2.50
OS_CYLINDER: -0.75
OS_SPHERE: +1.00
OS_AXIS: 085

## 2025-07-16 ASSESSMENT — ENCOUNTER SYMPTOMS
ENDOCRINE NEGATIVE: 0
CONSTITUTIONAL NEGATIVE: 0
MUSCULOSKELETAL NEGATIVE: 0
EYES NEGATIVE: 0
RESPIRATORY NEGATIVE: 0
CARDIOVASCULAR NEGATIVE: 0
PSYCHIATRIC NEGATIVE: 0
HEMATOLOGIC/LYMPHATIC NEGATIVE: 0
GASTROINTESTINAL NEGATIVE: 0
ALLERGIC/IMMUNOLOGIC NEGATIVE: 0
NEUROLOGICAL NEGATIVE: 0

## 2025-07-16 ASSESSMENT — CUP TO DISC RATIO
OS_RATIO: 0.25
OD_RATIO: 0.25

## 2025-07-16 ASSESSMENT — VISUAL ACUITY
OD_CC: 20/40
OD_CC+: -1
OD_PH_CC: 20/30
OS_CC: 20/40
CORRECTION_TYPE: GLASSES
OS_PH_CC: 20/30
METHOD: SNELLEN - LINEAR

## 2025-07-16 ASSESSMENT — REFRACTION_WEARINGRX
OD_CYLINDER: -1.75
OS_CYLINDER: -0.50
OD_ADD: +2.25
OS_AXIS: 086
OS_ADD: +2.25
SPECS_TYPE: PAL
OD_AXIS: 092
OS_SPHERE: +0.25
OD_SPHERE: +0.50

## 2025-07-16 ASSESSMENT — EXTERNAL EXAM - RIGHT EYE: OD_EXAM: NORMAL

## 2025-07-16 ASSESSMENT — TONOMETRY
OD_IOP_MMHG: 15
OS_IOP_MMHG: 14
IOP_METHOD: GOLDMANN APPLANATION

## 2025-07-16 ASSESSMENT — SLIT LAMP EXAM - LIDS
COMMENTS: NORMAL
COMMENTS: NORMAL

## 2025-07-16 ASSESSMENT — EXTERNAL EXAM - LEFT EYE: OS_EXAM: NORMAL

## 2025-07-16 ASSESSMENT — PAIN SCALES - GENERAL: PAINLEVEL_OUTOF10: 0-NO PAIN

## 2025-07-16 NOTE — PATIENT INSTRUCTIONS
Thank you so much for choosing me to provide your care today!    If you were dilated your vision may remain blurry   or light sensitive for several hours.    The nature of eye and vision problems can require frequent follow up, please make every effort to adhere to any future appointments.    If you have any issues, questions, or concerns,   please do not hesitate to reach out.    If you receive a survey in regards to your care today, please mention any exceptional care my office staff and/or technicians provided.    You can reach our office at this number:    [phone]    Please consider signing up for and utilizing [employer]!  This is the best way to directly reach me or other  providers

## 2025-07-16 NOTE — ASSESSMENT & PLAN NOTE
New Rx for glasses/SCL given per patient request. Patient's signature obtained to acknowledge and confirm that a paper copy of glasses/SCL Rx was given to patient in compliance with [facility] Eyeglass Rule. Electronic copy of Rx will also be available via [employer]/EPIC.

## 2025-07-16 NOTE — LETTER
July 16, 2025    Sheila Cheney MD  [id number]  [facility]  [facility] [address]    Patient: Desiree Shepard   YOB: 1960   Date of Visit: 7/16/2025       Dear Dr. Sheila Cheney MD:    Thank you for referring Desiree Shepard to me for evaluation. Here is my assessment and plan of care:    Assessment/Plan:  Desiree was seen today for annual exam.  Diagnoses and all orders for this visit:  Type 2 diabetes mellitus without complication, with long-term current use of insulin (Primary)  Age-related nuclear cataract of both eyes  Unspecified disorder of refraction    Right eye:     Left eye:    [x] No diabetes mellitus (DM) retinopathy [x] No diabetes mellitus (DM) retinopathy    [] Mild non-proliferative retinopathy [] Mild non-proliferative retinopathy    [] Moderate non-proliferative retinopathy [] Moderate non-proliferative retinopathy    [] Severe non-proliferative retinopathy [] Severe non-proliferative retinopathy    [] Proliferative retinopathy   [] Proliferative retinopathy    [] Diabetic macular edema   [] Diabetic macular edema    Urged patient to continue to work on best blood sugar and blood pressure control  Advised patient to call if any new vision changes noted    Will plan to repeat evaluation in:   [] 3 months [] 6 months [] 9 months [x] 12 months [] Other    Below you can find relevant pieces of the exam. If you have questions, please do not hesitate to call me. I look forward to following Desiree along with you.         Sincerely,        Alberto Betancur MD        CC:   No Recipients         External Exam         Right Left    External Normal Normal              Slit Lamp Exam         Right Left    Lids/Lashes Normal Normal    Conjunctiva/Sclera White and quiet White and quiet    Cornea Clear Clear    Anterior Chamber Deep and quiet Deep and quiet    Iris Round and reactive Round and reactive    Lens 1+ nuclear sclerosis, Trace Cortical cataract 1+ nuclear  "sclerosis, Trace Cortical cataract              Fundus Exam         Right Left    Vitreous Normal Normal    Disc Normal Normal    C/D Ratio 0.25 0.25    Macula Normal Normal    Vessels Normal Normal    Periphery Normal Normal                   <div id=\"[email or web address]\"></div>     "

## 2025-07-16 NOTE — PROGRESS NOTES
Assessment/Plan   Problem List Items Addressed This Visit       Type 2 diabetes mellitus without complication, with long-term current use of insulin - Primary    Advised no signs of diabetic changes on exam. Recommend to continue best sugar control and on need for annual checkup. Understands role of good BP control and weight loss if applicable. Discussed some components of selected studies like WESDR, DCCT, and UKPDS to describe the likely course of diabetes and effects on the eyes.           Age-related nuclear cataract of both eyes    Non significant cataract noted on exam. Discussed the natural course of cataract and may require surgery at some point in the future. Will plan to continue to monitor with serial exam.            Unspecified disorder of refraction    New Rx for glasses/SCL given per patient request. Patient's signature obtained to acknowledge and confirm that a paper copy of glasses/SCL Rx was given to patient in compliance with [facility] Eyeglass Rule. Electronic copy of Rx will also be available via [employer]/EPIC.               Provided reassurance regarding above diagnoses and care received in the office visit today. Discussed outcomes and options along with the importance of treatment compliance. Understands the importance of any follow up visits. Patient instructed to call/communicate with our office if any new issues, questions, or concerns.     Will plan to see back in 1 year full or sooner PRN

## 2025-07-28 ASSESSMENT — ENCOUNTER SYMPTOMS
CONFUSION: 0
DIZZINESS: 0
WEIGHT LOSS: 0
POLYPHAGIA: 0
NERVOUS/ANXIOUS: 1
POLYDIPSIA: 1
HEADACHES: 0
SEIZURES: 0
VISUAL CHANGE: 0
BLURRED VISION: 0
WEAKNESS: 1
SWEATS: 0
BLACKOUTS: 0
FATIGUE: 0
HUNGER: 0
SPEECH DIFFICULTY: 0
TREMORS: 1

## 2025-07-31 ENCOUNTER — APPOINTMENT (OUTPATIENT)
Dept: PRIMARY CARE | Facility: CLINIC | Age: 65
End: 2025-07-31
Payer: COMMERCIAL

## 2025-07-31 VITALS
BODY MASS INDEX: 25.68 KG/M2 | OXYGEN SATURATION: 98 % | HEART RATE: 57 BPM | DIASTOLIC BLOOD PRESSURE: 76 MMHG | WEIGHT: 136 LBS | RESPIRATION RATE: 16 BRPM | SYSTOLIC BLOOD PRESSURE: 120 MMHG | HEIGHT: 61 IN

## 2025-07-31 DIAGNOSIS — Z12.11 SCREENING FOR COLON CANCER: ICD-10-CM

## 2025-07-31 DIAGNOSIS — Z79.4 TYPE 2 DIABETES MELLITUS WITHOUT COMPLICATION, WITH LONG-TERM CURRENT USE OF INSULIN: Primary | ICD-10-CM

## 2025-07-31 DIAGNOSIS — K58.1 IRRITABLE BOWEL SYNDROME WITH CONSTIPATION: ICD-10-CM

## 2025-07-31 DIAGNOSIS — F33.1 MODERATE EPISODE OF RECURRENT MAJOR DEPRESSIVE DISORDER: ICD-10-CM

## 2025-07-31 DIAGNOSIS — E11.9 TYPE 2 DIABETES MELLITUS WITHOUT COMPLICATION, WITH LONG-TERM CURRENT USE OF INSULIN: Primary | ICD-10-CM

## 2025-07-31 DIAGNOSIS — E78.2 MIXED HYPERLIPIDEMIA: ICD-10-CM

## 2025-07-31 LAB — POC HEMOGLOBIN A1C: 7 % (ref 4.2–6.5)

## 2025-07-31 PROCEDURE — 3074F SYST BP LT 130 MM HG: CPT | Performed by: INTERNAL MEDICINE

## 2025-07-31 PROCEDURE — 83036 HEMOGLOBIN GLYCOSYLATED A1C: CPT | Performed by: INTERNAL MEDICINE

## 2025-07-31 PROCEDURE — 3078F DIAST BP <80 MM HG: CPT | Performed by: INTERNAL MEDICINE

## 2025-07-31 PROCEDURE — 3008F BODY MASS INDEX DOCD: CPT | Performed by: INTERNAL MEDICINE

## 2025-07-31 PROCEDURE — 99214 OFFICE O/P EST MOD 30 MIN: CPT | Performed by: INTERNAL MEDICINE

## 2025-07-31 PROCEDURE — 3051F HG A1C>EQUAL 7.0%<8.0%: CPT | Performed by: INTERNAL MEDICINE

## 2025-07-31 RX ORDER — BISACODYL 5 MG
10 TABLET, DELAYED RELEASE (ENTERIC COATED) ORAL DAILY PRN
Qty: 30 TABLET | Refills: 0 | Status: SHIPPED | OUTPATIENT
Start: 2025-07-31 | End: 2025-08-30

## 2025-07-31 RX ORDER — POLYETHYLENE GLYCOL 3350 17 G/17G
17 POWDER, FOR SOLUTION ORAL DAILY
Qty: 60 PACKET | Refills: 1 | Status: SHIPPED | OUTPATIENT
Start: 2025-07-31 | End: 2025-11-28

## 2025-07-31 ASSESSMENT — ENCOUNTER SYMPTOMS
POLYDIPSIA: 1
BLACKOUTS: 0
DEPRESSION: 0
WEAKNESS: 1
OCCASIONAL FEELINGS OF UNSTEADINESS: 0
SPEECH DIFFICULTY: 0
VISUAL CHANGE: 0
SWEATS: 0
NERVOUS/ANXIOUS: 1
CONFUSION: 0
LOSS OF SENSATION IN FEET: 0
HUNGER: 0
HEADACHES: 0
BLURRED VISION: 0
DIZZINESS: 0
TREMORS: 1
POLYPHAGIA: 0
WEIGHT LOSS: 0
FATIGUE: 0
SEIZURES: 0

## 2025-07-31 ASSESSMENT — PAIN SCALES - GENERAL: PAINLEVEL_OUTOF10: 3

## 2025-08-04 ENCOUNTER — NUTRITION (OUTPATIENT)
Dept: NUTRITION | Facility: HOSPITAL | Age: 65
End: 2025-08-04
Payer: COMMERCIAL

## 2025-08-04 DIAGNOSIS — E11.9 TYPE 2 DIABETES MELLITUS WITHOUT COMPLICATION, WITHOUT LONG-TERM CURRENT USE OF INSULIN: ICD-10-CM

## 2025-08-04 PROCEDURE — 99211 OFF/OP EST MAY X REQ PHY/QHP: CPT | Performed by: INTERNAL MEDICINE

## 2025-08-04 NOTE — PROGRESS NOTES
Reason for Visit:  Desiree Shepard is a 64 y.o. female who presents for Initial DSME Visit    DSME - Global Assessment  Assessment   Referring Provider: Sheila Cheney MD  Marital Status: single.  Support Person: Family.    What do you hope to gain from this diabetes education visit? Interested in learning about right foods to eat.    Have you had diabetes education in the past?  No.  In your words, what is Diabetes: High Blood sugar  Household Composition: living independently, alone      Type of Diabetes: Type 2  What year were you diagnosed with diabetes: has had for  15 years  Family History: pre-diabetes in the family    Comorbidities/Chronic Complications: cataracts, hyperlipidemia, and Myocardial infarction,anxiety.    Lab Results   Component Value Date    HGBA1C 7.0 (A) 07/31/2025    HGBA1C 11.2 (H) 02/18/2025    HGBA1C 7.2 (A) 12/05/2024    HGBA1C 7.4 (A) 09/05/2024    HGBA1C 7.7 (A) 06/06/2024    HGBA1C 8.6 (A) 03/04/2024    HGBA1C 8.5 (A) 12/04/2023       Demographics:   Difficulties with: None  Race/Ethnic Origin: White/  Occupation:  works at [employer]  Work hours: 7-330    Health Status:  Smoking/Tobacco Use: No, patient does not smoke or use tobacco.  No alcohol use.    Health Utilization Past 12 Months:   Hospital Admissions: Yes.  ER Visits: Yes.  Primary Care Visits: Yes.  Last Eye Exam : current  Podiatry : NA  Dentist : Is in fear of dentists, nurse explained importance of dental visits in relation to diabetic complications. Patient will take into consideration.    Diabetes Self-Management Skills and Behaviors:   Do you exercise regularly?: Yes. 5+ times/week.  Physical Activity : walking and very active at work  Yes  How do you manage your diabetes when you are sick?: call doctor and drink more fluids    Diabetes Medications: insulin  Current Medications[1]  Injection/Infusion Sites: abdominal wall. Appropriate disposal of sharps. Appropriate storage of insulin.    Monitorng: CGM    Acute  Complications-Safety: carries identification    Hypoglycemia: Frequency: occasionally  Hypoglycemia Treatment: Educated on S/S of hypoglycemia and 15/15 rule for treatment. In addition consuming protein snack once sugar back in range.     Hyperglycemia: polyuria, polydipsia, and tiredness  Hyperglycemia Treatment: Patient educated on the importance of water and walking if blood sugar high after a meal.  Call PCP if sugar goes above 400-500. In addition if vomiting and high blood sugar seek treatment at ER. Nurse explained that when engaging in exercise or strenuous activity, to consume protein prior to activity and stay well hydrated. It is recommended that blood sugar not be below 120 prior to vigorous exercise.       Diabetes Assessment:   DM Interferes with other aspects of my life: neutral.  My level of stress is high: agree.  I struggle with making changes in my life: agree.  How do you handle stress: play games on computer,sometimes just sit in dark, sleep  Most difficult part of managing DM: food        DSME - Meal Planning and Diet Recall  Nutrition Assessment    Are you currently following any meal plan: No Plan.    Does your culture or Baptism require any of the following: N/A  Who does the grocery shopping? patient  Who does the cooking in the home? patient    How often do you eat out? If I go to dinner bring half home, chicken, McDonal[employer],chicken nuggets,happy meal  How many meals do you eat in per day: two.  Which meals do you tend to skip: lunch  What do you drink with and between meals: water, coffee, and diet drinks    Diet Recall:  Patient lives alone.Does not cook often. Would benefit from food for life and cooking for 1.    Breakfast: bagel,toast,peanut butter and jelly sandwich.  Lunch: Doesn't eat unless its bought for her.  Dinner: salad with BBQ pizza, fried potatoes with spam,onions, cheese, spaghetti and meatballs. asparagus, broccoli, does not eat every meal.Tries to eat vegetables every  day.  Snack: peanut butter crackers.dark chocolate,make antiinflammatory shake, ice cream        DSME - Goals and Recommendations    [facility] Diabetes Education Program SMART Behavior Goal Setting:        S - Specific: Exactly what do you want to do        M - Measurable: Use a calendar or chart to track progress        A - Attainable: Take small steps to make bigger changes        R - Realistic: Pick something reasonable that you know you can do        T - Time Oriented: Choose a time limit (No longer than 6 months)    Specific Goal:   If on a Diabetes Medication, I will carry a form of sugar or glucose in case of a low blood sugar reaction.  I will check the bottoms of my feet and between my toes daily for cuts, sores, open areas and any changes in color. I will report problems to my doctor promptly.   I will eat a heart healthy diet.  I will count carbohydrates at each meal.   I will take my diabetes medication as directed by my doctor.    Measurable: How will I track my goal:  I will keep track of my progress daily by karo on phone.     Time Oriented: two months.     Topics Covered and Impression:   DSME Topics Covered During Visit:   A1c Review  Understanding Diabetes Basics  Reducing Your Risk For Other [place] Concerns  Reviewed Chronic Complications/Risks Related to Diabetes  Reviewing Medication Classes  Taking Medications as Prescribed  Being Physically Active  Review Glycemic Goals (CGM or SMBG)  Reviewed Hypoglycemia Signs/Symptoms/Tx Plan  Reviewed Hyperglycemia Signs/Symptoms/Tx Plan    Reviewed Diabetes Technology: Patient wears a Dexcom G7. For the last month her average was 179. GMI 7.6% 58% in range,15% high and 25% very high.      DSME Topics for Follow-Up:   Learning to [place] with Stress, Depression and Other Concerns  Reviewed Chronic Complications/Risks Related to Diabetes  Taking Medications as Prescribed  Being Physically Active  Review Glycemic Goals (CGM or SMBG)  Ways to Deal With  Diabetes Symptoms  Glycemic Pattern Management  Healthy Meal Plan  MyPlate Method    Readiness to Learn: demonstrates willingness to learn and needs reinforcement  Preferred learning method: observing, listening, and doing    DSME - Diabetes Self-Management Support    My Diabetes Self-Management Support Plan (Resources) : Age Well. Be Well Club (for age 55 and over). Call 1-263.254.6589 or [email or web address]. Offer free emailed publication on all [facility] health events and talks with some virtual options available.     Exercise Support : Silver Sneakers - free fitness program for seniors. For eligibility and locations: https://www.ItsPlatonic/    Diabetes Support Groups : Attend free lifestyle management & diabetes related talks    Stress Relief : Stress reduction CD's from [employer] or take a class on stress reduction techniques    Magazines and Journals : Diabetes Forecast 1-847.375.8895 www.diabetesforecast.org  Diabetes Self Management 1-451.592.6233 www.diabetesselfmanagement.[employer]    Websites & Large Online Community Forums and Support : (www.dagc.org  www.diabetes.org  www.eatright.org  www.dLife.com)  www.diabetes.org/ (American Diabetes Association) - Call with a question or chat online. Online support community forum for: Type 1 diabetes, Type 2 diabetes and Caregivers.   www.diabetesdaily.com Online support community forum for all types of diabetes    APPS : Calorie Justice (look up carbohydrate counts in foods and options to track diet, exercise and weight)  Glucose Abraham (free, tracks blood glucose, graphs),   My Fitness Pal (free)  GRACIELA,karo for nutritional value of foods via scanning.      Materials provided during today's visit: Handouts on hypoglycemia and hyperglycemia, diet, carb counting, label reading, and plate method. Food group choices for healthy meal plans. Additional materials on online diabetes educational resources.    Provider Impression: Pt is interested in lifestyle and  diet change to control her type 2 diabetes.   Pt encouraged to choose a variety of foods, to maintain consistent blood glucose levels. Balanced diet to incorporate carb, protein, healthy fats.  High fiber foods encouraged to help maintain BS within normal range.   Nurse educated on the plate method and explained why consumption of too many carb's, fats and processed foods will continue to keep blood sugar high. This despite a medication regime.   Nurse recommended to patient the Food for life program. Since patient lost her  she does not cook often. When she does it is high carb foods. Food for life could help teach her how to prepare healthy meals for 1.   Patient encouraged to start a purposeful exercise program. This may be helpful in reducing her overall blood sugar. She is very active at works and puts in an average of 10,000 steps a day. Purposeful walking would also be a activity which could help lower her blood sugar.  Reviewed variables that effect blood sugar readings and overall A1c outside of food sources.   Pt encouraged to take medication as prescribed. A pharmacy review may be beneficial to this patient due to elevated blood sugars with meals and inability to tolerate mealtime insulins due to hypoglycemic events.  Nurse recommends return for further education if having difficulty changing eating and exercising habits.     Time: I personally spent a total of 90 minutes with the patient providing diabetes self-management education. Visit documentation will be sent electronically to referring provider.              [1]   Current Outpatient Medications   Medication Sig Dispense Refill    aspirin 81 mg chewable tablet Chew 1 tablet (81 mg) once daily.      bisacodyl (Dulcolax, bisacodyl,) 5 mg EC tablet Take 2 tablets (10 mg) by mouth once daily as needed for constipation. Do not crush, chew, or split. 30 tablet 0    blood-glu meter,cont-transmit misc as directed      cholecalciferol (Vitamin D-3)  "25 MCG (1000 UT) capsule Take 1 capsule (25 mcg) by mouth once every 24 hours.      cyanocobalamin (Vitamin B-12) 1,000 mcg tablet Take 1 tablet (1,000 mcg) by mouth once daily.      Dexcom G7 Sensor device Change every 10 days 3 each 11    Dexcom G7 Sensor device CHANGE EVERY 10 DAYS 3 each 11    hydrOXYzine HCL (Atarax) 25 mg tablet TAKE TWO TABLETS BY MOUTH EVERY 8 HOURS FOR ANXIETY 270 tablet 3    insulin glargine-yfgn (Semglee,insulin glarg-yfgn,Pen) 100 unit/mL (3 mL) pen Inject 12 units subcutaneously every morning as directed (Patient taking differently: Inject 15 Units under the skin once daily in the morning.) 15 mL 6    Jardiance 25 mg tablet TAKE ONE TABLET BY MOUTH EVERY DAY 90 tablet 3    metoprolol succinate XL (Toprol-XL) 25 mg 24 hr tablet Take 1 tablet (25 mg) by mouth once daily. Do not crush or chew. 30 tablet 3    multivitamin tablet Take 1 tablet by mouth once daily.      PARoxetine (Paxil) 40 mg tablet Take 1.5 tablets (60 mg) by mouth once daily. 135 tablet 3    pen needle, diabetic (Sure Comfort Pen Needle) 32 gauge x 5/32\" needle Use four times dailiy with insulin 200 each 6    polyethylene glycol (Glycolax, Miralax) 17 gram packet Take 17 g by mouth once daily. Mix 1 cap (17g) into 8 ounces of fluid. 60 packet 1    pravastatin (Pravachol) 20 mg tablet Take 1 tablet (20 mg) by mouth once daily. 90 tablet 3    traZODone (Desyrel) 100 mg tablet TAKE TWO TABLETS BY MOUTH EVERY  tablet 3    Trulicity 4.5 mg/0.5 mL pen injector INJECT 4.5MG UNDER THE SKIN ONCE WEEKLY 2 mL 3     No current facility-administered medications for this visit.     "

## 2025-08-07 ENCOUNTER — APPOINTMENT (OUTPATIENT)
Dept: PRIMARY CARE | Facility: CLINIC | Age: 65
End: 2025-08-07
Payer: COMMERCIAL

## 2025-08-14 DIAGNOSIS — Z79.4 TYPE 2 DIABETES MELLITUS WITHOUT COMPLICATION, WITH LONG-TERM CURRENT USE OF INSULIN: Primary | ICD-10-CM

## 2025-08-14 DIAGNOSIS — E11.9 TYPE 2 DIABETES MELLITUS WITHOUT COMPLICATION, WITH LONG-TERM CURRENT USE OF INSULIN: Primary | ICD-10-CM

## 2025-08-16 ENCOUNTER — OFFICE VISIT (OUTPATIENT)
Dept: URGENT CARE | Age: 65
End: 2025-08-16
Payer: COMMERCIAL

## 2025-08-16 ENCOUNTER — ANCILLARY PROCEDURE (OUTPATIENT)
Dept: URGENT CARE | Age: 65
End: 2025-08-16
Payer: COMMERCIAL

## 2025-08-16 VITALS
WEIGHT: 135 LBS | DIASTOLIC BLOOD PRESSURE: 67 MMHG | RESPIRATION RATE: 18 BRPM | TEMPERATURE: 98 F | HEART RATE: 94 BPM | HEIGHT: 61 IN | SYSTOLIC BLOOD PRESSURE: 108 MMHG | BODY MASS INDEX: 25.49 KG/M2 | OXYGEN SATURATION: 97 %

## 2025-08-16 DIAGNOSIS — R52 PAIN: ICD-10-CM

## 2025-08-16 DIAGNOSIS — M79.631 RIGHT FOREARM PAIN: ICD-10-CM

## 2025-08-16 DIAGNOSIS — S20.212A RIB CONTUSION, LEFT, INITIAL ENCOUNTER: Primary | ICD-10-CM

## 2025-08-16 DIAGNOSIS — S80.811A ABRASION OF RIGHT LOWER EXTREMITY, INITIAL ENCOUNTER: ICD-10-CM

## 2025-08-16 PROCEDURE — 99213 OFFICE O/P EST LOW 20 MIN: CPT | Performed by: PHYSICIAN ASSISTANT

## 2025-08-16 PROCEDURE — 73090 X-RAY EXAM OF FOREARM: CPT | Mod: RIGHT SIDE | Performed by: PHYSICIAN ASSISTANT

## 2025-08-16 PROCEDURE — 90715 TDAP VACCINE 7 YRS/> IM: CPT | Performed by: PHYSICIAN ASSISTANT

## 2025-08-16 PROCEDURE — 71101 X-RAY EXAM UNILAT RIBS/CHEST: CPT | Mod: LEFT SIDE | Performed by: PHYSICIAN ASSISTANT

## 2025-08-16 PROCEDURE — 3074F SYST BP LT 130 MM HG: CPT | Performed by: PHYSICIAN ASSISTANT

## 2025-08-16 PROCEDURE — 3078F DIAST BP <80 MM HG: CPT | Performed by: PHYSICIAN ASSISTANT

## 2025-08-16 PROCEDURE — 3051F HG A1C>EQUAL 7.0%<8.0%: CPT | Performed by: PHYSICIAN ASSISTANT

## 2025-08-16 PROCEDURE — 90471 IMMUNIZATION ADMIN: CPT | Performed by: PHYSICIAN ASSISTANT

## 2025-08-16 PROCEDURE — 1036F TOBACCO NON-USER: CPT | Performed by: PHYSICIAN ASSISTANT

## 2025-08-16 PROCEDURE — 3008F BODY MASS INDEX DOCD: CPT | Performed by: PHYSICIAN ASSISTANT

## 2025-08-22 LAB — NONINV COLON CA DNA+OCC BLD SCRN STL QL: NEGATIVE

## 2025-11-24 ENCOUNTER — APPOINTMENT (OUTPATIENT)
Dept: PRIMARY CARE | Facility: CLINIC | Age: 65
End: 2025-11-24
Payer: COMMERCIAL

## (undated) DEVICE — GLIDEWIRE, ANGLE, STANDARD, .035 X 180CM, 1.5MM J-TIP

## (undated) DEVICE — GUIDEWIRE, J TIP, 3 MM, 0.035 IN X 260 CM, PTFE

## (undated) DEVICE — PAD, ELECTRODE DEFIB PADPRO ADULT STRL W/ADAPTER

## (undated) DEVICE — CATHETER, EXPO, MODEL-D, MDL-D, 6FR FR5

## (undated) DEVICE — CATHETER, EXPO, MODEL-D, 6FR FL3.5

## (undated) DEVICE — KIT, NAMIC STANDARD LEFT HEART, CUSTOM, LWMC

## (undated) DEVICE — PACK, ANGIO P2, CUSTOM, LAKE

## (undated) DEVICE — GLIDESHEATH, SLENDER 6FR, 10CM, NITINOL KIT

## (undated) DEVICE — TR BAND, RADIAL COMPRESSION, STANDARD, 24CM